# Patient Record
Sex: FEMALE | Race: WHITE | Employment: UNEMPLOYED | URBAN - METROPOLITAN AREA
[De-identification: names, ages, dates, MRNs, and addresses within clinical notes are randomized per-mention and may not be internally consistent; named-entity substitution may affect disease eponyms.]

---

## 2018-01-06 ENCOUNTER — HOSPITAL ENCOUNTER (EMERGENCY)
Age: 48
Discharge: HOME HEALTH | End: 2018-01-07
Attending: EMERGENCY MEDICINE

## 2018-01-06 DIAGNOSIS — J44.1 COPD EXACERBATION (HCC): Primary | ICD-10-CM

## 2018-01-06 DIAGNOSIS — R05.9 COUGH: ICD-10-CM

## 2018-01-06 PROCEDURE — 94640 AIRWAY INHALATION TREATMENT: CPT

## 2018-01-06 PROCEDURE — 6370000000 HC RX 637 (ALT 250 FOR IP): Performed by: EMERGENCY MEDICINE

## 2018-01-06 PROCEDURE — 99284 EMERGENCY DEPT VISIT MOD MDM: CPT

## 2018-01-06 RX ORDER — IPRATROPIUM BROMIDE AND ALBUTEROL SULFATE 2.5; .5 MG/3ML; MG/3ML
SOLUTION RESPIRATORY (INHALATION)
Status: DISCONTINUED
Start: 2018-01-06 | End: 2018-01-07 | Stop reason: HOSPADM

## 2018-01-06 RX ORDER — IPRATROPIUM BROMIDE AND ALBUTEROL SULFATE 2.5; .5 MG/3ML; MG/3ML
1 SOLUTION RESPIRATORY (INHALATION)
Status: DISCONTINUED | OUTPATIENT
Start: 2018-01-07 | End: 2018-01-07 | Stop reason: HOSPADM

## 2018-01-06 RX ORDER — AZITHROMYCIN 250 MG/1
500 TABLET, FILM COATED ORAL ONCE
Status: COMPLETED | OUTPATIENT
Start: 2018-01-06 | End: 2018-01-07

## 2018-01-06 RX ORDER — DEXAMETHASONE 4 MG/1
8 TABLET ORAL ONCE
Status: COMPLETED | OUTPATIENT
Start: 2018-01-06 | End: 2018-01-07

## 2018-01-06 RX ORDER — GUAIFENESIN 400 MG/1
400 TABLET ORAL 4 TIMES DAILY PRN
COMMUNITY
End: 2019-12-20

## 2018-01-06 RX ADMIN — IPRATROPIUM BROMIDE AND ALBUTEROL SULFATE 1 AMPULE: .5; 3 SOLUTION RESPIRATORY (INHALATION) at 23:49

## 2018-01-07 VITALS
SYSTOLIC BLOOD PRESSURE: 162 MMHG | HEART RATE: 77 BPM | DIASTOLIC BLOOD PRESSURE: 79 MMHG | HEIGHT: 59 IN | BODY MASS INDEX: 56.45 KG/M2 | OXYGEN SATURATION: 100 % | WEIGHT: 280 LBS | TEMPERATURE: 98.4 F | RESPIRATION RATE: 20 BRPM

## 2018-01-07 PROCEDURE — 6370000000 HC RX 637 (ALT 250 FOR IP): Performed by: EMERGENCY MEDICINE

## 2018-01-07 PROCEDURE — 94640 AIRWAY INHALATION TREATMENT: CPT

## 2018-01-07 PROCEDURE — 6360000002 HC RX W HCPCS: Performed by: EMERGENCY MEDICINE

## 2018-01-07 RX ORDER — BENZONATATE 100 MG/1
100 CAPSULE ORAL 3 TIMES DAILY PRN
Qty: 20 CAPSULE | Refills: 0 | Status: SHIPPED | OUTPATIENT
Start: 2018-01-07 | End: 2019-12-20

## 2018-01-07 RX ORDER — ALBUTEROL SULFATE 90 UG/1
AEROSOL, METERED RESPIRATORY (INHALATION)
Qty: 1 INHALER | Refills: 0 | Status: ON HOLD | OUTPATIENT
Start: 2018-01-07 | End: 2022-03-01 | Stop reason: SDUPTHER

## 2018-01-07 RX ORDER — PREDNISONE 20 MG/1
40 TABLET ORAL DAILY
Qty: 10 TABLET | Refills: 0 | Status: SHIPPED | OUTPATIENT
Start: 2018-01-07 | End: 2018-01-12

## 2018-01-07 RX ORDER — IPRATROPIUM BROMIDE AND ALBUTEROL SULFATE 2.5; .5 MG/3ML; MG/3ML
1 SOLUTION RESPIRATORY (INHALATION) ONCE
Status: COMPLETED | OUTPATIENT
Start: 2018-01-07 | End: 2018-01-07

## 2018-01-07 RX ORDER — AZITHROMYCIN 250 MG/1
TABLET, FILM COATED ORAL
Qty: 6 TABLET | Refills: 0 | Status: SHIPPED | OUTPATIENT
Start: 2018-01-07 | End: 2018-01-17

## 2018-01-07 RX ADMIN — IPRATROPIUM BROMIDE AND ALBUTEROL SULFATE 1 AMPULE: .5; 3 SOLUTION RESPIRATORY (INHALATION) at 01:07

## 2018-01-07 RX ADMIN — AZITHROMYCIN 500 MG: 250 TABLET, FILM COATED ORAL at 00:01

## 2018-01-07 RX ADMIN — DEXAMETHASONE 8 MG: 4 TABLET ORAL at 00:01

## 2018-01-07 ASSESSMENT — ENCOUNTER SYMPTOMS
SINUS PRESSURE: 0
ABDOMINAL PAIN: 0
CHEST TIGHTNESS: 0
STRIDOR: 0
CONSTIPATION: 0
DIARRHEA: 0
EYE REDNESS: 0
RHINORRHEA: 1
SINUS PAIN: 1
VOMITING: 0
EYE DISCHARGE: 0
SORE THROAT: 0
CHOKING: 0
VOICE CHANGE: 0
BACK PAIN: 0
EYE PAIN: 0
BLOOD IN STOOL: 0
TROUBLE SWALLOWING: 0
SHORTNESS OF BREATH: 1
WHEEZING: 1
FACIAL SWELLING: 0
COUGH: 1

## 2019-12-20 ENCOUNTER — HOSPITAL ENCOUNTER (EMERGENCY)
Age: 49
Discharge: HOME | End: 2019-12-20
Attending: EMERGENCY MEDICINE

## 2019-12-20 ENCOUNTER — APPOINTMENT (OUTPATIENT)
Dept: GENERAL RADIOLOGY | Age: 49
End: 2019-12-20

## 2019-12-20 VITALS
HEART RATE: 100 BPM | BODY MASS INDEX: 56.68 KG/M2 | DIASTOLIC BLOOD PRESSURE: 84 MMHG | HEIGHT: 58 IN | OXYGEN SATURATION: 99 % | WEIGHT: 270 LBS | RESPIRATION RATE: 26 BRPM | TEMPERATURE: 98.4 F | SYSTOLIC BLOOD PRESSURE: 188 MMHG

## 2019-12-20 DIAGNOSIS — R07.89 CHEST WALL PAIN: ICD-10-CM

## 2019-12-20 DIAGNOSIS — J20.9 ACUTE BRONCHITIS, UNSPECIFIED ORGANISM: ICD-10-CM

## 2019-12-20 DIAGNOSIS — J44.1 COPD EXACERBATION (HCC): Primary | ICD-10-CM

## 2019-12-20 DIAGNOSIS — F41.1 ANXIETY STATE: ICD-10-CM

## 2019-12-20 LAB
ALBUMIN SERPL-MCNC: 3.8 G/DL (ref 3.5–4.6)
ALP BLD-CCNC: 85 U/L (ref 40–130)
ALT SERPL-CCNC: 21 U/L (ref 0–33)
ANION GAP SERPL CALCULATED.3IONS-SCNC: 13 MEQ/L (ref 9–15)
APTT: 24 SEC (ref 24.4–36.8)
AST SERPL-CCNC: 13 U/L (ref 0–35)
BASOPHILS ABSOLUTE: 0.1 K/UL (ref 0–0.2)
BASOPHILS RELATIVE PERCENT: 0.5 %
BILIRUB SERPL-MCNC: <0.2 MG/DL (ref 0.2–0.7)
BUN BLDV-MCNC: 15 MG/DL (ref 6–20)
CALCIUM SERPL-MCNC: 9.8 MG/DL (ref 8.5–9.9)
CHLORIDE BLD-SCNC: 104 MEQ/L (ref 95–107)
CO2: 23 MEQ/L (ref 20–31)
CREAT SERPL-MCNC: 0.76 MG/DL (ref 0.5–0.9)
EKG ATRIAL RATE: 87 BPM
EKG P AXIS: 46 DEGREES
EKG P-R INTERVAL: 146 MS
EKG Q-T INTERVAL: 368 MS
EKG QRS DURATION: 68 MS
EKG QTC CALCULATION (BAZETT): 442 MS
EKG R AXIS: 74 DEGREES
EKG T AXIS: 45 DEGREES
EKG VENTRICULAR RATE: 87 BPM
EOSINOPHILS ABSOLUTE: 0.5 K/UL (ref 0–0.7)
EOSINOPHILS RELATIVE PERCENT: 3.9 %
GFR AFRICAN AMERICAN: >60
GFR NON-AFRICAN AMERICAN: >60
GLOBULIN: 3.4 G/DL (ref 2.3–3.5)
GLUCOSE BLD-MCNC: 115 MG/DL (ref 70–99)
HCT VFR BLD CALC: 41.4 % (ref 37–47)
HEMOGLOBIN: 13 G/DL (ref 12–16)
HYPOCHROMIA: PRESENT
INR BLD: 0.9
LYMPHOCYTES ABSOLUTE: 1.5 K/UL (ref 1–4.8)
LYMPHOCYTES RELATIVE PERCENT: 12.3 %
MCH RBC QN AUTO: 25.7 PG (ref 27–31.3)
MCHC RBC AUTO-ENTMCNC: 31.3 % (ref 33–37)
MCV RBC AUTO: 82.3 FL (ref 82–100)
MONOCYTES ABSOLUTE: 0.9 K/UL (ref 0.2–0.8)
MONOCYTES RELATIVE PERCENT: 7.5 %
NEUTROPHILS ABSOLUTE: 9.4 K/UL (ref 1.4–6.5)
NEUTROPHILS RELATIVE PERCENT: 75.8 %
PDW BLD-RTO: 15.8 % (ref 11.5–14.5)
PLATELET # BLD: 345 K/UL (ref 130–400)
POTASSIUM SERPL-SCNC: 4.3 MEQ/L (ref 3.4–4.9)
PRO-BNP: 39 PG/ML
PROTHROMBIN TIME: 12.3 SEC (ref 12.3–14.9)
RBC # BLD: 5.03 M/UL (ref 4.2–5.4)
SODIUM BLD-SCNC: 140 MEQ/L (ref 135–144)
TOTAL PROTEIN: 7.2 G/DL (ref 6.3–8)
TROPONIN: <0.01 NG/ML (ref 0–0.01)
WBC # BLD: 12.4 K/UL (ref 4.8–10.8)

## 2019-12-20 PROCEDURE — 85025 COMPLETE CBC W/AUTO DIFF WBC: CPT

## 2019-12-20 PROCEDURE — 71045 X-RAY EXAM CHEST 1 VIEW: CPT

## 2019-12-20 PROCEDURE — 99285 EMERGENCY DEPT VISIT HI MDM: CPT

## 2019-12-20 PROCEDURE — 84484 ASSAY OF TROPONIN QUANT: CPT

## 2019-12-20 PROCEDURE — 96375 TX/PRO/DX INJ NEW DRUG ADDON: CPT

## 2019-12-20 PROCEDURE — 85610 PROTHROMBIN TIME: CPT

## 2019-12-20 PROCEDURE — 96376 TX/PRO/DX INJ SAME DRUG ADON: CPT

## 2019-12-20 PROCEDURE — 6360000002 HC RX W HCPCS: Performed by: EMERGENCY MEDICINE

## 2019-12-20 PROCEDURE — 93005 ELECTROCARDIOGRAM TRACING: CPT

## 2019-12-20 PROCEDURE — 96365 THER/PROPH/DIAG IV INF INIT: CPT

## 2019-12-20 PROCEDURE — 6370000000 HC RX 637 (ALT 250 FOR IP): Performed by: EMERGENCY MEDICINE

## 2019-12-20 PROCEDURE — 2580000003 HC RX 258: Performed by: EMERGENCY MEDICINE

## 2019-12-20 PROCEDURE — 36415 COLL VENOUS BLD VENIPUNCTURE: CPT

## 2019-12-20 PROCEDURE — 80053 COMPREHEN METABOLIC PANEL: CPT

## 2019-12-20 PROCEDURE — 94640 AIRWAY INHALATION TREATMENT: CPT

## 2019-12-20 PROCEDURE — 83880 ASSAY OF NATRIURETIC PEPTIDE: CPT

## 2019-12-20 PROCEDURE — 85730 THROMBOPLASTIN TIME PARTIAL: CPT

## 2019-12-20 RX ORDER — ONDANSETRON 4 MG/1
4 TABLET, ORALLY DISINTEGRATING ORAL ONCE
Status: COMPLETED | OUTPATIENT
Start: 2019-12-20 | End: 2019-12-20

## 2019-12-20 RX ORDER — METHYLPREDNISOLONE SODIUM SUCCINATE 125 MG/2ML
125 INJECTION, POWDER, LYOPHILIZED, FOR SOLUTION INTRAMUSCULAR; INTRAVENOUS ONCE
Status: COMPLETED | OUTPATIENT
Start: 2019-12-20 | End: 2019-12-20

## 2019-12-20 RX ORDER — AZITHROMYCIN 250 MG/1
TABLET, FILM COATED ORAL
Qty: 1 PACKET | Refills: 0 | Status: SHIPPED | OUTPATIENT
Start: 2019-12-20 | End: 2021-09-04

## 2019-12-20 RX ORDER — HYDROCODONE BITARTRATE AND ACETAMINOPHEN 5; 325 MG/1; MG/1
1 TABLET ORAL EVERY 6 HOURS PRN
Qty: 10 TABLET | Refills: 0 | Status: SHIPPED | OUTPATIENT
Start: 2019-12-20 | End: 2019-12-23

## 2019-12-20 RX ORDER — ALBUTEROL SULFATE 90 UG/1
2 AEROSOL, METERED RESPIRATORY (INHALATION) EVERY 6 HOURS PRN
Qty: 1 INHALER | Refills: 3 | Status: ON HOLD | OUTPATIENT
Start: 2019-12-20 | End: 2022-03-01 | Stop reason: HOSPADM

## 2019-12-20 RX ORDER — AZITHROMYCIN 250 MG/1
500 TABLET, FILM COATED ORAL ONCE
Status: COMPLETED | OUTPATIENT
Start: 2019-12-20 | End: 2019-12-20

## 2019-12-20 RX ORDER — METHYLPREDNISOLONE 4 MG/1
TABLET ORAL
Qty: 1 KIT | Refills: 0 | Status: SHIPPED | OUTPATIENT
Start: 2019-12-20 | End: 2021-09-04 | Stop reason: ALTCHOICE

## 2019-12-20 RX ADMIN — AZITHROMYCIN 500 MG: 250 TABLET, FILM COATED ORAL at 21:29

## 2019-12-20 RX ADMIN — METHYLPREDNISOLONE SODIUM SUCCINATE 125 MG: 125 INJECTION, POWDER, FOR SOLUTION INTRAMUSCULAR; INTRAVENOUS at 21:29

## 2019-12-20 RX ADMIN — ONDANSETRON 4 MG: 4 TABLET, ORALLY DISINTEGRATING ORAL at 21:29

## 2019-12-20 RX ADMIN — CEFTRIAXONE 1 G: 1 INJECTION, POWDER, FOR SOLUTION INTRAMUSCULAR; INTRAVENOUS at 21:29

## 2019-12-20 RX ADMIN — HYDROMORPHONE HYDROCHLORIDE 0.5 MG: 1 INJECTION, SOLUTION INTRAMUSCULAR; INTRAVENOUS; SUBCUTANEOUS at 21:29

## 2019-12-20 RX ADMIN — ALBUTEROL SULFATE 5 MG: 2.5 SOLUTION RESPIRATORY (INHALATION) at 21:23

## 2019-12-20 RX ADMIN — HYDROMORPHONE HYDROCHLORIDE 0.5 MG: 1 INJECTION, SOLUTION INTRAMUSCULAR; INTRAVENOUS; SUBCUTANEOUS at 22:23

## 2019-12-20 ASSESSMENT — PAIN SCALES - GENERAL
PAINLEVEL_OUTOF10: 9
PAINLEVEL_OUTOF10: 5
PAINLEVEL_OUTOF10: 9

## 2019-12-20 ASSESSMENT — PAIN DESCRIPTION - PAIN TYPE: TYPE: ACUTE PAIN

## 2019-12-20 ASSESSMENT — PAIN DESCRIPTION - LOCATION: LOCATION: CHEST

## 2019-12-21 PROCEDURE — 93010 ELECTROCARDIOGRAM REPORT: CPT | Performed by: INTERNAL MEDICINE

## 2021-09-04 ENCOUNTER — OFFICE VISIT (OUTPATIENT)
Dept: FAMILY MEDICINE CLINIC | Age: 51
End: 2021-09-04

## 2021-09-04 ENCOUNTER — HOSPITAL ENCOUNTER (OUTPATIENT)
Age: 51
Setting detail: SPECIMEN
Discharge: HOME | End: 2021-09-04

## 2021-09-04 VITALS
TEMPERATURE: 98.9 F | WEIGHT: 286 LBS | HEART RATE: 86 BPM | DIASTOLIC BLOOD PRESSURE: 80 MMHG | HEIGHT: 59 IN | SYSTOLIC BLOOD PRESSURE: 130 MMHG | OXYGEN SATURATION: 95 % | RESPIRATION RATE: 24 BRPM | BODY MASS INDEX: 57.66 KG/M2

## 2021-09-04 DIAGNOSIS — J40 BRONCHITIS: Primary | ICD-10-CM

## 2021-09-04 DIAGNOSIS — R30.0 DYSURIA: ICD-10-CM

## 2021-09-04 LAB
BILIRUBIN, POC: ABNORMAL
BLOOD URINE, POC: ABNORMAL
CLARITY, POC: CLEAR
COLOR, POC: YELLOW
GLUCOSE URINE, POC: ABNORMAL
KETONES, POC: ABNORMAL
LEUKOCYTE EST, POC: ABNORMAL
NITRITE, POC: ABNORMAL
PH, POC: 6
PROTEIN, POC: ABNORMAL
SPECIFIC GRAVITY, POC: 1.03
UROBILINOGEN, POC: ABNORMAL

## 2021-09-04 PROCEDURE — 87077 CULTURE AEROBIC IDENTIFY: CPT

## 2021-09-04 PROCEDURE — 81003 URINALYSIS AUTO W/O SCOPE: CPT | Performed by: PHYSICIAN ASSISTANT

## 2021-09-04 PROCEDURE — 99213 OFFICE O/P EST LOW 20 MIN: CPT | Performed by: PHYSICIAN ASSISTANT

## 2021-09-04 PROCEDURE — 87086 URINE CULTURE/COLONY COUNT: CPT

## 2021-09-04 RX ORDER — CLOTRIMAZOLE 1 %
CREAM (GRAM) TOPICAL
Qty: 45 G | Refills: 1 | Status: SHIPPED | OUTPATIENT
Start: 2021-09-04 | End: 2021-09-11

## 2021-09-04 RX ORDER — ALBUTEROL SULFATE 2.5 MG/3ML
2.5 SOLUTION RESPIRATORY (INHALATION) EVERY 6 HOURS PRN
Qty: 25 EACH | Refills: 1 | Status: ON HOLD | OUTPATIENT
Start: 2021-09-04 | End: 2022-03-01 | Stop reason: HOSPADM

## 2021-09-04 RX ORDER — ALBUTEROL SULFATE 90 UG/1
AEROSOL, METERED RESPIRATORY (INHALATION)
Qty: 18 G | Refills: 1 | Status: ON HOLD | OUTPATIENT
Start: 2021-09-04 | End: 2022-03-01 | Stop reason: HOSPADM

## 2021-09-04 RX ORDER — AZITHROMYCIN 250 MG/1
250 TABLET, FILM COATED ORAL SEE ADMIN INSTRUCTIONS
Qty: 6 TABLET | Refills: 0 | Status: SHIPPED | OUTPATIENT
Start: 2021-09-04 | End: 2021-09-09

## 2021-09-04 ASSESSMENT — ENCOUNTER SYMPTOMS
VOMITING: 0
TROUBLE SWALLOWING: 0
ABDOMINAL PAIN: 0
BACK PAIN: 0
COUGH: 1
SHORTNESS OF BREATH: 1

## 2021-09-04 ASSESSMENT — PATIENT HEALTH QUESTIONNAIRE - PHQ9
1. LITTLE INTEREST OR PLEASURE IN DOING THINGS: 0
SUM OF ALL RESPONSES TO PHQ9 QUESTIONS 1 & 2: 1
2. FEELING DOWN, DEPRESSED OR HOPELESS: 1
SUM OF ALL RESPONSES TO PHQ QUESTIONS 1-9: 1

## 2021-09-04 NOTE — PATIENT INSTRUCTIONS
Patient Education        Bronchitis: Care Instructions  Your Care Instructions     Bronchitis is inflammation of the bronchial tubes, which carry air to the lungs. The tubes swell and produce mucus, or phlegm. The mucus and inflamed bronchial tubes make you cough. You may have trouble breathing. Most cases of bronchitis are caused by viruses like those that cause colds. Antibiotics usually do not help and they may be harmful. Bronchitis usually develops rapidly and lasts about 2 to 3 weeks in otherwise healthy people. Follow-up care is a key part of your treatment and safety. Be sure to make and go to all appointments, and call your doctor if you are having problems. It's also a good idea to know your test results and keep a list of the medicines you take. How can you care for yourself at home? · Take all medicines exactly as prescribed. Call your doctor if you think you are having a problem with your medicine. · Get some extra rest.  · Take an over-the-counter pain medicine, such as acetaminophen (Tylenol), ibuprofen (Advil, Motrin), or naproxen (Aleve) to reduce fever and relieve body aches. Read and follow all instructions on the label. · Do not take two or more pain medicines at the same time unless the doctor told you to. Many pain medicines have acetaminophen, which is Tylenol. Too much acetaminophen (Tylenol) can be harmful. · Take an over-the-counter cough medicine that contains dextromethorphan to help quiet a dry, hacking cough so that you can sleep. Avoid cough medicines that have more than one active ingredient. Read and follow all instructions on the label. · Breathe moist air from a humidifier, hot shower, or sink filled with hot water. The heat and moisture will thin mucus so you can cough it out. · Do not smoke. Smoking can make bronchitis worse. If you need help quitting, talk to your doctor about stop-smoking programs and medicines.  These can increase your chances of quitting for good.  When should you call for help? Call 911 anytime you think you may need emergency care. For example, call if:    · You have severe trouble breathing. Call your doctor now or seek immediate medical care if:    · You have new or worse trouble breathing.     · You cough up dark brown or bloody mucus (sputum).     · You have a new or higher fever.     · You have a new rash. Watch closely for changes in your health, and be sure to contact your doctor if:    · You cough more deeply or more often, especially if you notice more mucus or a change in the color of your mucus.     · You are not getting better as expected. Where can you learn more? Go to https://Meridian.[employer]. org and sign in to your [employer] account. Enter H333 in the Short Fuze box to learn more about \"Bronchitis: Care Instructions. \"     If you do not have an account, please click on the \"Sign Up Now\" link. Current as of: October 26, 2020               Content Version: 12.9  © 2006-2021 [employer]. Care instructions adapted under license by [facility] ([facility]). If you have questions about a medical condition or this instruction, always ask your healthcare professional. [place] [address] any warranty or liability for your use of this information. Patient Education        Painful Urination (Dysuria): Care Instructions  Your Care Instructions  Burning pain with urination (dysuria) is a common symptom of a urinary tract infection or other urinary problems. The bladder may become inflamed. This can cause pain when the bladder fills and empties. You may also feel pain if the tube that carries urine from the bladder to the outside of the body (urethra) gets irritated or infected. Sexually transmitted infections (STIs) also may cause pain when you urinate. Sometimes the pain can be caused by things other than an infection.  The urethra can be irritated by soaps, perfumes, or foreign objects in the urethra. Kidney stones can cause pain when they pass through the urethra. The cause may be hard to find. You may need tests. Treatment for painful urination depends on the cause. Follow-up care is a key part of your treatment and safety. Be sure to make and go to all appointments, and call your doctor if you are having problems. It's also a good idea to know your test results and keep a list of the medicines you take. How can you care for yourself at home? · Drink extra water for the next day or two. This will help make the urine less concentrated. (If you have kidney, heart, or liver disease and have to limit fluids, talk with your doctor before you increase the amount of fluids you drink.)  · Avoid drinks that are carbonated or have caffeine. They can irritate the bladder. · Urinate often. Try to empty your bladder each time. For women:  · Urinate right after you have sex. · After going to the bathroom, wipe from front to back. · Avoid douches, bubble baths, and feminine hygiene sprays. And avoid other feminine hygiene products that have deodorants. When should you call for help? Call your doctor now or seek immediate medical care if:    · You have new symptoms, such as fever, nausea, or vomiting.     · You have new or worse symptoms of a urinary problem. For example:  ? You have blood or pus in your urine. ? You have chills or body aches. ? It hurts worse to urinate. ? You have groin or belly pain. ? You have pain in your back just below your rib cage (the flank area). Watch closely for changes in your health, and be sure to contact your doctor if you have any problems. Where can you learn more? Go to https://[employer]b.[employer] org and sign in to your Direct Sitters account. Enter B832 in the Tweegee box to learn more about \"Painful Urination (Dysuria): Care Instructions. \"     If you do not have an account, please click on the \"Sign Up Now\" link.   Current as of: February 10, 2021               Content Version: 12.9  © 4003-7052 Healthwise, Incorporated. Care instructions adapted under license by [facility] ([facility]). If you have questions about a medical condition or this instruction, always ask your healthcare professional. [address] any warranty or liability for your use of this information.

## 2021-09-04 NOTE — PROGRESS NOTES
[facility] [address] Encounter  CHIEF COMPLAINT       Chief Complaint   Patient presents with    Dysuria     x4-5 days    Cough     x10 days    Congestion     x10 days; yellow mucus       HISTORY OF PRESENT ILLNESS   Russell Jung is a 49 y.o. female who presents with:  71-year-old female with 1 week history of cough productive of yellow sputum. She is a smoker and also history of COPD. Admits to some shortness of breath during the coughing episodes. Coughing has caused her to leak urine. She has had some dysuria and frequency over the past 2 days with some vaginal irritation. No fever or vomiting, otherwise nothing making symptoms better or worse, no other associated symptoms or complaints given. REVIEW OF SYSTEMS     Review of Systems   Constitutional: Negative for fever. HENT: Negative for trouble swallowing. Eyes: Negative for visual disturbance. Respiratory: Positive for cough and shortness of breath. Cardiovascular: Negative for chest pain. Gastrointestinal: Negative for abdominal pain and vomiting. Genitourinary: Positive for dysuria and frequency. Musculoskeletal: Negative for back pain. Skin: Negative for rash. Neurological: Negative for headaches. Psychiatric/Behavioral: Negative for confusion. PAST MEDICAL HISTORY         Diagnosis Date    [facility]     COPD (chronic obstructive pulmonary disease) (HCC)     MI [facility]myocardial infarction) [facility]     MS (multiple sclerosis) ([facility]     TB (tuberculosis)      SURGICAL HISTORY     Patient  has a past surgical history that includes  section and orthopedic surgery.   CURRENT MEDICATIONS       Previous Medications    ALBUTEROL (PROVENTIL) (5 MG/ML) 0.5% NEBULIZER SOLUTION    Take 0.5 mLs by nebulization every 6 hours as needed for Wheezing    ALBUTEROL SULFATE HFA (PROVENTIL HFA) 108 (90 BASE) MCG/ACT INHALER    Inhale 2 puffs into the lungs every 6 hours as needed for Wheezing    ALBUTEROL SULFATE  (90 BASE) MCG/ACT INHALER    Use 2 puffs 4 times daily for 7 days then as needed for wheezing. Dispense with Spacer and instruct in use. At patient's preference may use 60 dose MDI. May Sub Pro-Air or Proventil as needed per insurance. ALLERGIES     Patient is is allergic to tape [adhesive tape]. FAMILY HISTORY     Patient'sfamily history includes Arthritis in her brother, brother, and father; Asthma in her mother; Heart Disease in her father; High Blood Pressure in her mother; High Cholesterol in her mother. HISTORY     Patient  reports that she has been smoking. She has been smoking about 1.00 pack per day. She has never used smokeless tobacco. She reports that she does not drink alcohol and does not use drugs. PHYSICAL EXAM     VITALS  BP: 130/80, Temp: 98.9 °F (37.2 °C), Pulse: 86, Resp: 24, SpO2: 95 %  Physical Exam  Vitals and nursing note reviewed. Constitutional:       Appearance: Normal appearance. HENT:      Head: Normocephalic and atraumatic. Right Ear: Tympanic membrane normal.      Left Ear: Tympanic membrane normal.      Nose: Nose normal.      Mouth/Throat:      Mouth: Mucous membranes are moist.   Eyes:      Extraocular Movements: Extraocular movements intact. Pupils: Pupils are equal, round, and reactive to light. Cardiovascular:      Rate and Rhythm: Normal rate and regular rhythm. Heart sounds: No murmur heard. Pulmonary:      Effort: Pulmonary effort is normal.      Breath sounds: Normal breath sounds. No stridor. No wheezing or rales. Abdominal:      Palpations: Abdomen is soft. Tenderness: There is no abdominal tenderness. Musculoskeletal:         General: Normal range of motion. Cervical back: Normal range of motion and neck supple. No rigidity. Skin:     General: Skin is warm and dry. Capillary Refill: Capillary refill takes less than 2 seconds.    Neurological: General: No focal deficit present. Mental Status: She is alert. Psychiatric:         Mood and Affect: Mood normal.       READY CARE COURSE   Labs:  No results found for this visit on 09/04/21. IMAGING:  No orders to display     Scheduled Meds:  Continuous Infusions:  PRN Meds:. PROCEDURES:  FINAL IMPRESSION      1. Dysuria      DISPOSITION/PLAN   MDM  52-year-old female with 1 week history of cough and chest congestion history of Copd and smoker. Also some dysuria and frequency. She is well-appearing lungs are clear. Vital signs stable pulse ox 95%. Urinalysis was negative for leukocytes. Patient has a nebulizer machine at home which she will be given proair and albuterol for the machine. Zithromax prescribed . She admits to some vaginal irritation, questionable history of candidiasis in the past clotrimazole was prescribed. She will call for follow-up, most likely will call health ministry for follow-up. Instructed to be rechecked this week. Instructed on red flag symptoms which would prompt ER evaluation. PATIENT REFERRED TO:  Return in about 5 days (around 9/9/2021). DISCHARGE MEDICATIONS:  New Prescriptions    No medications on file     Cannot display discharge medications since this is not an admission.        Russ Dillard PA-C

## 2021-09-06 LAB
ORGANISM: ABNORMAL
URINE CULTURE, ROUTINE: ABNORMAL

## 2022-02-27 ENCOUNTER — HOSPITAL ENCOUNTER (INPATIENT)
Age: 52
LOS: 2 days | Discharge: HOME | DRG: 189 | End: 2022-03-01
Attending: EMERGENCY MEDICINE | Admitting: INTERNAL MEDICINE

## 2022-02-27 ENCOUNTER — APPOINTMENT (OUTPATIENT)
Dept: GENERAL RADIOLOGY | Age: 52
DRG: 189 | End: 2022-02-27

## 2022-02-27 DIAGNOSIS — F17.200 SMOKING: ICD-10-CM

## 2022-02-27 DIAGNOSIS — J44.1 COPD WITH ACUTE EXACERBATION (HCC): ICD-10-CM

## 2022-02-27 DIAGNOSIS — E87.5 HYPERKALEMIA: ICD-10-CM

## 2022-02-27 DIAGNOSIS — J44.1 COPD EXACERBATION (HCC): Primary | ICD-10-CM

## 2022-02-27 LAB
ALBUMIN SERPL-MCNC: 3.7 G/DL (ref 3.5–4.6)
ALP BLD-CCNC: 95 U/L (ref 40–130)
ALT SERPL-CCNC: 55 U/L (ref 0–33)
ANION GAP SERPL CALCULATED.3IONS-SCNC: 10 MEQ/L (ref 9–15)
APTT: 27.5 SEC (ref 24.4–36.8)
AST SERPL-CCNC: 59 U/L (ref 0–35)
BASOPHILS ABSOLUTE: 0.1 K/UL (ref 0–0.1)
BASOPHILS RELATIVE PERCENT: 0.6 % (ref 0.1–1.2)
BILIRUB SERPL-MCNC: 0.3 MG/DL (ref 0.2–0.7)
BUN BLDV-MCNC: 14 MG/DL (ref 6–20)
CALCIUM SERPL-MCNC: 9.4 MG/DL (ref 8.5–9.9)
CHLORIDE BLD-SCNC: 103 MEQ/L (ref 95–107)
CO2: 23 MEQ/L (ref 20–31)
CREAT SERPL-MCNC: 0.67 MG/DL (ref 0.5–0.9)
EKG ATRIAL RATE: 94 BPM
EKG P AXIS: 38 DEGREES
EKG P-R INTERVAL: 142 MS
EKG Q-T INTERVAL: 356 MS
EKG QRS DURATION: 68 MS
EKG QTC CALCULATION (BAZETT): 445 MS
EKG R AXIS: 72 DEGREES
EKG T AXIS: 41 DEGREES
EKG VENTRICULAR RATE: 94 BPM
EOSINOPHILS ABSOLUTE: 0.2 K/UL (ref 0–0.4)
EOSINOPHILS RELATIVE PERCENT: 2.7 % (ref 0.7–5.8)
GFR AFRICAN AMERICAN: >60
GFR NON-AFRICAN AMERICAN: >60
GLOBULIN: 3.4 G/DL (ref 2.3–3.5)
GLUCOSE BLD-MCNC: 77 MG/DL (ref 70–99)
HCT VFR BLD CALC: 50.9 % (ref 37–47)
HEMOGLOBIN: 16.9 G/DL (ref 11.2–15.7)
IMMATURE GRANULOCYTES #: 0.1 K/UL
IMMATURE GRANULOCYTES %: 0.6 %
INR BLD: 0.9
LYMPHOCYTES ABSOLUTE: 0.8 K/UL (ref 1.2–3.7)
LYMPHOCYTES RELATIVE PERCENT: 9.1 %
MCH RBC QN AUTO: 32 PG (ref 25.6–32.2)
MCHC RBC AUTO-ENTMCNC: 33.2 % (ref 32.2–35.5)
MCV RBC AUTO: 96.4 FL (ref 79.4–94.8)
MONOCYTES ABSOLUTE: 1.1 K/UL (ref 0.2–0.9)
MONOCYTES RELATIVE PERCENT: 12.6 % (ref 4.7–12.5)
NEUTROPHILS ABSOLUTE: 6.6 K/UL (ref 1.6–6.1)
NEUTROPHILS RELATIVE PERCENT: 74.4 % (ref 34–71.1)
PDW BLD-RTO: 13.8 % (ref 11.7–14.4)
PLATELET # BLD: 177 K/UL (ref 182–369)
POTASSIUM REFLEX MAGNESIUM: 5.5 MEQ/L (ref 3.4–4.9)
POTASSIUM SERPL-SCNC: 4.1 MEQ/L (ref 3.4–4.9)
PRO-BNP: 53 PG/ML
PROTHROMBIN TIME: 12.5 SEC (ref 12.3–14.9)
RBC # BLD: 5.28 M/UL (ref 3.93–5.22)
SARS-COV-2, NAAT: NOT DETECTED
SODIUM BLD-SCNC: 136 MEQ/L (ref 135–144)
TOTAL PROTEIN: 7.1 G/DL (ref 6.3–8)
TROPONIN: <0.01 NG/ML (ref 0–0.01)
WBC # BLD: 8.9 K/UL (ref 4–10)

## 2022-02-27 PROCEDURE — 96374 THER/PROPH/DIAG INJ IV PUSH: CPT

## 2022-02-27 PROCEDURE — 99285 EMERGENCY DEPT VISIT HI MDM: CPT

## 2022-02-27 PROCEDURE — 93005 ELECTROCARDIOGRAM TRACING: CPT

## 2022-02-27 PROCEDURE — 87040 BLOOD CULTURE FOR BACTERIA: CPT

## 2022-02-27 PROCEDURE — 85025 COMPLETE CBC W/AUTO DIFF WBC: CPT

## 2022-02-27 PROCEDURE — 83880 ASSAY OF NATRIURETIC PEPTIDE: CPT

## 2022-02-27 PROCEDURE — 6370000000 HC RX 637 (ALT 250 FOR IP): Performed by: INTERNAL MEDICINE

## 2022-02-27 PROCEDURE — 85730 THROMBOPLASTIN TIME PARTIAL: CPT

## 2022-02-27 PROCEDURE — 2580000003 HC RX 258: Performed by: EMERGENCY MEDICINE

## 2022-02-27 PROCEDURE — 87635 SARS-COV-2 COVID-19 AMP PRB: CPT

## 2022-02-27 PROCEDURE — 85610 PROTHROMBIN TIME: CPT

## 2022-02-27 PROCEDURE — 71045 X-RAY EXAM CHEST 1 VIEW: CPT

## 2022-02-27 PROCEDURE — 6370000000 HC RX 637 (ALT 250 FOR IP): Performed by: EMERGENCY MEDICINE

## 2022-02-27 PROCEDURE — 93010 ELECTROCARDIOGRAM REPORT: CPT | Performed by: INTERNAL MEDICINE

## 2022-02-27 PROCEDURE — 6360000002 HC RX W HCPCS: Performed by: EMERGENCY MEDICINE

## 2022-02-27 PROCEDURE — 1210000000 HC MED SURG R&B

## 2022-02-27 PROCEDURE — 84132 ASSAY OF SERUM POTASSIUM: CPT

## 2022-02-27 PROCEDURE — 84484 ASSAY OF TROPONIN QUANT: CPT

## 2022-02-27 PROCEDURE — 94640 AIRWAY INHALATION TREATMENT: CPT

## 2022-02-27 PROCEDURE — 36415 COLL VENOUS BLD VENIPUNCTURE: CPT

## 2022-02-27 PROCEDURE — 2580000003 HC RX 258: Performed by: INTERNAL MEDICINE

## 2022-02-27 PROCEDURE — 80053 COMPREHEN METABOLIC PANEL: CPT

## 2022-02-27 RX ORDER — DOXYCYCLINE HYCLATE 100 MG/1
100 CAPSULE ORAL EVERY 12 HOURS
Status: DISCONTINUED | OUTPATIENT
Start: 2022-02-28 | End: 2022-03-01 | Stop reason: HOSPADM

## 2022-02-27 RX ORDER — SODIUM CHLORIDE 9 MG/ML
25 INJECTION, SOLUTION INTRAVENOUS PRN
Status: DISCONTINUED | OUTPATIENT
Start: 2022-02-27 | End: 2022-03-01 | Stop reason: HOSPADM

## 2022-02-27 RX ORDER — IPRATROPIUM BROMIDE AND ALBUTEROL SULFATE 2.5; .5 MG/3ML; MG/3ML
1 SOLUTION RESPIRATORY (INHALATION) ONCE
Status: COMPLETED | OUTPATIENT
Start: 2022-02-27 | End: 2022-02-27

## 2022-02-27 RX ORDER — ONDANSETRON 4 MG/1
4 TABLET, ORALLY DISINTEGRATING ORAL EVERY 8 HOURS PRN
Status: DISCONTINUED | OUTPATIENT
Start: 2022-02-27 | End: 2022-03-01 | Stop reason: HOSPADM

## 2022-02-27 RX ORDER — ONDANSETRON 2 MG/ML
4 INJECTION INTRAMUSCULAR; INTRAVENOUS EVERY 6 HOURS PRN
Status: DISCONTINUED | OUTPATIENT
Start: 2022-02-27 | End: 2022-03-01 | Stop reason: HOSPADM

## 2022-02-27 RX ORDER — METHYLPREDNISOLONE SODIUM SUCCINATE 125 MG/2ML
125 INJECTION, POWDER, LYOPHILIZED, FOR SOLUTION INTRAMUSCULAR; INTRAVENOUS ONCE
Status: COMPLETED | OUTPATIENT
Start: 2022-02-27 | End: 2022-02-27

## 2022-02-27 RX ORDER — POLYETHYLENE GLYCOL 3350 17 G/17G
17 POWDER, FOR SOLUTION ORAL DAILY PRN
Status: DISCONTINUED | OUTPATIENT
Start: 2022-02-27 | End: 2022-03-01 | Stop reason: HOSPADM

## 2022-02-27 RX ORDER — ACETAMINOPHEN 650 MG/1
650 SUPPOSITORY RECTAL EVERY 6 HOURS PRN
Status: DISCONTINUED | OUTPATIENT
Start: 2022-02-27 | End: 2022-03-01 | Stop reason: HOSPADM

## 2022-02-27 RX ORDER — 0.9 % SODIUM CHLORIDE 0.9 %
1000 INTRAVENOUS SOLUTION INTRAVENOUS ONCE
Status: COMPLETED | OUTPATIENT
Start: 2022-02-27 | End: 2022-02-27

## 2022-02-27 RX ORDER — ACETAMINOPHEN 325 MG/1
650 TABLET ORAL EVERY 6 HOURS PRN
Status: DISCONTINUED | OUTPATIENT
Start: 2022-02-27 | End: 2022-03-01 | Stop reason: HOSPADM

## 2022-02-27 RX ORDER — SODIUM CHLORIDE 0.9 % (FLUSH) 0.9 %
5-40 SYRINGE (ML) INJECTION EVERY 12 HOURS SCHEDULED
Status: DISCONTINUED | OUTPATIENT
Start: 2022-02-27 | End: 2022-03-01 | Stop reason: HOSPADM

## 2022-02-27 RX ORDER — LEVOFLOXACIN 5 MG/ML
750 INJECTION, SOLUTION INTRAVENOUS ONCE
Status: COMPLETED | OUTPATIENT
Start: 2022-02-27 | End: 2022-02-27

## 2022-02-27 RX ORDER — GUAIFENESIN 600 MG/1
600 TABLET, EXTENDED RELEASE ORAL 3 TIMES DAILY PRN
Status: DISCONTINUED | OUTPATIENT
Start: 2022-02-27 | End: 2022-02-28

## 2022-02-27 RX ORDER — METHYLPREDNISOLONE SODIUM SUCCINATE 40 MG/ML
40 INJECTION, POWDER, LYOPHILIZED, FOR SOLUTION INTRAMUSCULAR; INTRAVENOUS EVERY 8 HOURS
Status: DISCONTINUED | OUTPATIENT
Start: 2022-02-28 | End: 2022-03-01 | Stop reason: HOSPADM

## 2022-02-27 RX ORDER — SODIUM CHLORIDE 0.9 % (FLUSH) 0.9 %
5-40 SYRINGE (ML) INJECTION PRN
Status: DISCONTINUED | OUTPATIENT
Start: 2022-02-27 | End: 2022-03-01 | Stop reason: HOSPADM

## 2022-02-27 RX ORDER — ALBUTEROL SULFATE 2.5 MG/3ML
2.5 SOLUTION RESPIRATORY (INHALATION) EVERY 4 HOURS PRN
Status: DISCONTINUED | OUTPATIENT
Start: 2022-02-27 | End: 2022-03-01 | Stop reason: HOSPADM

## 2022-02-27 RX ORDER — IPRATROPIUM BROMIDE AND ALBUTEROL SULFATE 2.5; .5 MG/3ML; MG/3ML
1 SOLUTION RESPIRATORY (INHALATION) 3 TIMES DAILY
Status: DISCONTINUED | OUTPATIENT
Start: 2022-02-27 | End: 2022-03-01 | Stop reason: HOSPADM

## 2022-02-27 RX ADMIN — SODIUM CHLORIDE 1000 ML: 9 INJECTION, SOLUTION INTRAVENOUS at 18:43

## 2022-02-27 RX ADMIN — IPRATROPIUM BROMIDE AND ALBUTEROL SULFATE 1 AMPULE: .5; 2.5 SOLUTION RESPIRATORY (INHALATION) at 17:14

## 2022-02-27 RX ADMIN — LEVOFLOXACIN 750 MG: 5 INJECTION, SOLUTION INTRAVENOUS at 18:49

## 2022-02-27 RX ADMIN — Medication 10 ML: at 22:00

## 2022-02-27 RX ADMIN — METHYLPREDNISOLONE SODIUM SUCCINATE 125 MG: 125 INJECTION, POWDER, FOR SOLUTION INTRAMUSCULAR; INTRAVENOUS at 17:11

## 2022-02-27 RX ADMIN — IPRATROPIUM BROMIDE AND ALBUTEROL SULFATE 1 AMPULE: .5; 2.5 SOLUTION RESPIRATORY (INHALATION) at 22:12

## 2022-02-27 NOTE — ED PROVIDER NOTES
eMERGENCY dEPARTMENT eNCOUnter      CHIEF COMPLAINT    Chief Complaint   Patient presents with    Shortness of Breath     x2 days        HPI    Malena Dunn is a 47 y.o. female with hx of COPD , MI, TB, Smoking who presentsto ED from home   By private car   With complaint of SOB   Onset 2 days   Intensity of symptoms moderate   Patient is out of her nebulizer,. She has got cough with expectoration. She denies any fever or chills, chest pain. She says that her shortness of breath is worse since yesterday. She is short of breath with minimal exertion. Patient denies any chest pain. PAST MEDICAL HISTORY    Past Medical History:   Diagnosis Date    Cerebral artery occlusion with cerebral infarction ([address]     [facility]     [facility]myocardial infarction) [facility]     MS [facility]multiple sclerosis) [facility]     TB (tuberculosis)        SURGICAL HISTORY    Past Surgical History:   Procedure Laterality Date     SECTION      ORTHOPEDIC SURGERY      Thumb       CURRENT MEDICATIONS    Current Outpatient Rx   Medication Sig Dispense Refill    albuterol sulfate HFA (PROAIR HFA) 108 (90 Base) MCG/ACT inhaler Use every 4 hours while awake for 7-10 days then PRN wheezing  Dispense with SPACER and Instruct on use. May sub Ventolin or Proventil as needed per [employer].  18 g 1    albuterol (PROVENTIL) (2.5 MG/3ML) 0.083% nebulizer solution Take 3 mLs by nebulization every 6 hours as needed for Wheezing 25 each 1    albuterol sulfate HFA (PROVENTIL HFA) 108 (90 Base) MCG/ACT inhaler Inhale 2 puffs into the lungs every 6 hours as needed for Wheezing (Patient not taking: Reported on 2021) 1 Inhaler 3    albuterol (PROVENTIL) (5 MG/ML) 0.5% nebulizer solution Take 0.5 mLs by nebulization every 6 hours as needed for Wheezing (Patient not taking: Reported on 2021) 120 each 0    albuterol sulfate  (90 Base) MCG/ACT inhaler Use 2 puffs 4 times daily for 7 days then as needed for wheezing. Dispense with Spacer and instruct in use. At patient's preference may use 60 dose MDI. May Sub Pro-Air or Proventil as needed per insurance. (Patient not taking: Reported on 9/4/2021) 1 Inhaler 0       ALLERGIES    Allergies   Allergen Reactions    Tape Garnell  Tape] Other (See Comments)     Skin blisters       FAMILY HISTORY    Family History   Problem Relation Age of Onset    Asthma Mother     High Blood Pressure Mother     High Cholesterol Mother     Arthritis Father     Heart Disease Father     Arthritis Brother     Arthritis Brother        SOCIAL HISTORY    Social History     Socioeconomic History    Marital status:      Spouse name: None    Number of children: None    Years of education: None    Highest education level: None   Occupational History    None   Tobacco Use    Smoking status: Current Every Day Smoker     Packs/day: 1.00    Smokeless tobacco: Never Used   Vaping Use    Vaping Use: Never used   Substance and Sexual Activity    Alcohol use: No    Drug use: No    Sexual activity: Yes     Partners: Male   Other Topics Concern    None   Social History Narrative    None     Social Determinants of Health     Financial Resource Strain:     Difficulty of Paying Living Expenses: Not on file   Food Insecurity:     Worried About Running Out of Food in the Last Year: Not on file    Giovana of Food in the Last Year: Not on file   Transportation Needs:     Lack of Transportation (Medical): Not on file    Lack of Transportation (Non-Medical):  Not on file   Physical Activity:     Days of Exercise per Week: Not on file    Minutes of Exercise per Session: Not on file   Stress:     Feeling of Stress : Not on file   Social Connections:     Frequency of Communication with Friends and Family: Not on file    Frequency of Social Gatherings with Friends and Family: Not on file    Attends Hoahaoism Services: Not on file    Active Member of Clubs or Organizations: Not on file    Attends Club or Organization Meetings: Not on file    Marital Status: Not on file   Intimate Partner Violence:     Fear of Current or Ex-Partner: Not on file    Emotionally Abused: Not on file    Physically Abused: Not on file    Sexually Abused: Not on file   Housing Stability:     Unable to Pay for Housing in the Last Year: Not on file    Number of [place] in the Last Year: Not on file    Unstable Housing in the Last Year: Not on file       REVIEW OF SYSTEMS    Constitutional:  Denies fever, chills, weight loss or weakness   Eyes:  Denies photophobia or discharge   HENT:  Denies sore throat or ear pain   Respiratory: Complains of cough and shortness of breath   Cardiovascular:  Denies chest pain, palpitations or swelling   GI:  Denies abdominal pain, nausea, vomiting, or diarrhea   Musculoskeletal:  Denies back pain   Skin:  Denies rash   Neurologic:  Denies headache, focal weakness or sensory changes   Endocrine:  Denies polyuria or polydypsia   Lymphatic:  Denies swollen glands   Psychiatric:  Denies depression, suicidal ideation or homicidal ideation   All systems negative except as marked. PHYSICAL EXAM    VITAL SIGNS: BP (!) 154/66   Pulse 98   Temp 98.9 °F (37.2 °C) (Temporal)   Resp 22   Wt 280 lb (127 kg)   SpO2 96%   BMI 56.55 kg/m²    Constitutional: Morbidly obese, moderate acute distress, Non-toxic appearance. HENT:  Normocephalic, Atraumatic, Bilateral external ears normal, Oropharynx moist, No oral exudates, Nose normal. Neck- Normal range of motion, No tenderness, Supple, No stridor. Eyes:  PERRL, EOMI, Conjunctiva normal, No discharge. Respiratory:  Normal breath sounds, moderate respiratory distress, diffuse expiratory wheezing, No chest tenderness. Cardiovascular:  Normal heart rate, Normal rhythm, No murmurs, No rubs, No gallops. GI:  Bowel sounds normal, Soft, No tenderness, No masses, No pulsatile masses. :No CVA tenderness. Musculoskeletal:  Intact distal pulses, No pedal edema, No calf tenderness, No cyanosis, No clubbing. Good range of motion in all major joints. No tenderness to palpation or major deformities noted. Back- No tenderness. Integument:  Warm, Dry, No erythema, No rash. Lymphatic:  No lymphadenopathy noted. Neurologic:  Alert & oriented x 3, Normal motor function, Normal sensory function, No focal deficits noted. Psychiatric:  Affect normal, Judgment normal, Mood normal.     EKG    NSR, HR 94 , Normal Axis, No ST- T wave changes, QTc 445    RADIOLOGY    XR CHEST PORTABLE   Final Result   NO ACUTE CARDIOPULMONARY DISEASE. REEVALUATION   Patient was updated the results of labs and Radiology.   Spoke with Dr Cesar Butt accepted patient admission     Labs  Labs Reviewed   CBC WITH AUTO DIFFERENTIAL - Abnormal; Notable for the following components:       Result Value    RBC 5.28 (*)     Hemoglobin 16.9 (*)     Hematocrit 50.9 (*)     MCV 96.4 (*)     Platelets 696 (*)     Neutrophils % 74.4 (*)     Monocytes % 12.6 (*)     Neutrophils Absolute 6.6 (*)     Lymphocytes Absolute 0.8 (*)     Monocytes Absolute 1.1 (*)     All other components within normal limits   COMPREHENSIVE METABOLIC PANEL W/ REFLEX TO MG FOR LOW K - Abnormal; Notable for the following components:    Potassium reflex Magnesium 5.5 (*)     ALT 55 (*)     AST 59 (*)     All other components within normal limits   COVID-19, RAPID   CULTURE, BLOOD 1   CULTURE, BLOOD 2   TROPONIN   BRAIN NATRIURETIC PEPTIDE   PROTIME-INR   APTT             Summation      Patient Course:     ED Medications administered this visit:    Medications   0.9 % sodium chloride bolus (1,000 mLs IntraVENous New Bag 2/27/22 9042)   levoFLOXacin (LEVAQUIN) 750 MG/150ML infusion 750 mg (has no administration in time range)   methylPREDNISolone sodium (SOLU-MEDROL) injection 125 mg (125 mg IntraVENous Given 2/27/22 1711)   ipratropium-albuterol (DUONEB) nebulizer solution 1 ampule (1 ampule Inhalation Given 2/27/22 1994)       New Prescriptions from this visit:    New Prescriptions    No medications on file       Follow-up:  No follow-up provider specified. Final Impression:   1. COPD exacerbation ([address]    2. Hyperkalemia    3.  Smoking               (Please note that portions of this note were completed with a voice recognition program.  Efforts were made to edit the dictations but occasionally words are mis-transcribed.)          John Quinn MD  02/27/22 6235

## 2022-02-27 NOTE — ED TRIAGE NOTES
Shortness of breath and cough x2 days. Patient states she has been out of nebulizer treatments for some time. A/0 x3, ambulatory, dyspnea on exertion. She does not wear oxygen at home.

## 2022-02-27 NOTE — ED NOTES
Dr. Nehemiah Weeks agrees to admit. Supervisor aware patient to go to 224. Registration aware.      Aurelia Pate, RN  02/27/22 [id number] Deepali Pro RN  02/27/22 0449

## 2022-02-28 LAB
ALBUMIN SERPL-MCNC: 3.4 G/DL (ref 3.5–4.6)
ALP BLD-CCNC: 90 U/L (ref 40–130)
ALT SERPL-CCNC: 44 U/L (ref 0–33)
ANION GAP SERPL CALCULATED.3IONS-SCNC: 9 MEQ/L (ref 9–15)
AST SERPL-CCNC: 23 U/L (ref 0–35)
BASOPHILS ABSOLUTE: 0 K/UL (ref 0–0.1)
BASOPHILS RELATIVE PERCENT: 0.2 % (ref 0.1–1.2)
BILIRUB SERPL-MCNC: <0.2 MG/DL (ref 0.2–0.7)
BILIRUBIN DIRECT: <0.2 MG/DL (ref 0–0.4)
BILIRUBIN, INDIRECT: ABNORMAL MG/DL (ref 0–0.6)
BUN BLDV-MCNC: 12 MG/DL (ref 6–20)
CALCIUM SERPL-MCNC: 9.2 MG/DL (ref 8.5–9.9)
CHLORIDE BLD-SCNC: 108 MEQ/L (ref 95–107)
CO2: 22 MEQ/L (ref 20–31)
CREAT SERPL-MCNC: 0.73 MG/DL (ref 0.5–0.9)
EOSINOPHILS ABSOLUTE: 0 K/UL (ref 0–0.4)
EOSINOPHILS RELATIVE PERCENT: 0 % (ref 0.7–5.8)
GFR AFRICAN AMERICAN: >60
GFR NON-AFRICAN AMERICAN: >60
GLUCOSE BLD-MCNC: 199 MG/DL (ref 70–99)
HCT VFR BLD CALC: 47.1 % (ref 37–47)
HEMOGLOBIN: 15.3 G/DL (ref 11.2–15.7)
IMMATURE GRANULOCYTES #: 0.1 K/UL
IMMATURE GRANULOCYTES %: 0.7 %
LYMPHOCYTES ABSOLUTE: 0.7 K/UL (ref 1.2–3.7)
LYMPHOCYTES RELATIVE PERCENT: 7.5 %
MCH RBC QN AUTO: 31.1 PG (ref 25.6–32.2)
MCHC RBC AUTO-ENTMCNC: 32.5 % (ref 32.2–35.5)
MCV RBC AUTO: 95.7 FL (ref 79.4–94.8)
MONOCYTES ABSOLUTE: 0.4 K/UL (ref 0.2–0.9)
MONOCYTES RELATIVE PERCENT: 4.5 % (ref 4.7–12.5)
NEUTROPHILS ABSOLUTE: 7.9 K/UL (ref 1.6–6.1)
NEUTROPHILS RELATIVE PERCENT: 87.1 % (ref 34–71.1)
PDW BLD-RTO: 13.5 % (ref 11.7–14.4)
PLATELET # BLD: 172 K/UL (ref 182–369)
POTASSIUM REFLEX MAGNESIUM: 4.4 MEQ/L (ref 3.4–4.9)
RBC # BLD: 4.92 M/UL (ref 3.93–5.22)
SODIUM BLD-SCNC: 139 MEQ/L (ref 135–144)
TOTAL PROTEIN: 6.4 G/DL (ref 6.3–8)
WBC # BLD: 9.1 K/UL (ref 4–10)

## 2022-02-28 PROCEDURE — 80074 ACUTE HEPATITIS PANEL: CPT

## 2022-02-28 PROCEDURE — 80048 BASIC METABOLIC PNL TOTAL CA: CPT

## 2022-02-28 PROCEDURE — 1210000000 HC MED SURG R&B

## 2022-02-28 PROCEDURE — 6370000000 HC RX 637 (ALT 250 FOR IP): Performed by: INTERNAL MEDICINE

## 2022-02-28 PROCEDURE — 80076 HEPATIC FUNCTION PANEL: CPT

## 2022-02-28 PROCEDURE — 2580000003 HC RX 258: Performed by: INTERNAL MEDICINE

## 2022-02-28 PROCEDURE — 85025 COMPLETE CBC W/AUTO DIFF WBC: CPT

## 2022-02-28 PROCEDURE — 94640 AIRWAY INHALATION TREATMENT: CPT

## 2022-02-28 PROCEDURE — 6360000002 HC RX W HCPCS: Performed by: INTERNAL MEDICINE

## 2022-02-28 PROCEDURE — 94761 N-INVAS EAR/PLS OXIMETRY MLT: CPT

## 2022-02-28 PROCEDURE — 83036 HEMOGLOBIN GLYCOSYLATED A1C: CPT

## 2022-02-28 PROCEDURE — 36415 COLL VENOUS BLD VENIPUNCTURE: CPT

## 2022-02-28 PROCEDURE — 2700000000 HC OXYGEN THERAPY PER DAY

## 2022-02-28 RX ORDER — GUAIFENESIN 600 MG/1
600 TABLET, EXTENDED RELEASE ORAL 2 TIMES DAILY
Status: DISCONTINUED | OUTPATIENT
Start: 2022-02-28 | End: 2022-03-01 | Stop reason: HOSPADM

## 2022-02-28 RX ORDER — ALPRAZOLAM 0.5 MG/1
0.5 TABLET ORAL EVERY 6 HOURS PRN
Status: DISCONTINUED | OUTPATIENT
Start: 2022-02-28 | End: 2022-03-01 | Stop reason: HOSPADM

## 2022-02-28 RX ORDER — NICOTINE 21 MG/24HR
1 PATCH, TRANSDERMAL 24 HOURS TRANSDERMAL DAILY
Status: DISCONTINUED | OUTPATIENT
Start: 2022-02-28 | End: 2022-03-01 | Stop reason: HOSPADM

## 2022-02-28 RX ORDER — NICOTINE 21 MG/24HR
1 PATCH, TRANSDERMAL 24 HOURS TRANSDERMAL DAILY
Status: DISCONTINUED | OUTPATIENT
Start: 2022-02-28 | End: 2022-02-28

## 2022-02-28 RX ORDER — GUAIFENESIN/DEXTROMETHORPHAN 100-10MG/5
5 SYRUP ORAL EVERY 4 HOURS PRN
Status: DISCONTINUED | OUTPATIENT
Start: 2022-02-28 | End: 2022-03-01 | Stop reason: HOSPADM

## 2022-02-28 RX ADMIN — IPRATROPIUM BROMIDE AND ALBUTEROL SULFATE 1 AMPULE: .5; 2.5 SOLUTION RESPIRATORY (INHALATION) at 18:00

## 2022-02-28 RX ADMIN — IPRATROPIUM BROMIDE AND ALBUTEROL SULFATE 1 AMPULE: .5; 2.5 SOLUTION RESPIRATORY (INHALATION) at 11:19

## 2022-02-28 RX ADMIN — ACETAMINOPHEN 650 MG: 325 TABLET ORAL at 00:10

## 2022-02-28 RX ADMIN — METHYLPREDNISOLONE SODIUM SUCCINATE 40 MG: 40 INJECTION, POWDER, FOR SOLUTION INTRAMUSCULAR; INTRAVENOUS at 16:54

## 2022-02-28 RX ADMIN — GUAIFENESIN 600 MG: 600 TABLET ORAL at 08:58

## 2022-02-28 RX ADMIN — IPRATROPIUM BROMIDE AND ALBUTEROL SULFATE 1 AMPULE: .5; 2.5 SOLUTION RESPIRATORY (INHALATION) at 06:04

## 2022-02-28 RX ADMIN — ACETAMINOPHEN 650 MG: 325 TABLET ORAL at 06:21

## 2022-02-28 RX ADMIN — DOXYCYCLINE HYCLATE 100 MG: 100 CAPSULE ORAL at 20:29

## 2022-02-28 RX ADMIN — ACETAMINOPHEN 650 MG: 325 TABLET ORAL at 21:20

## 2022-02-28 RX ADMIN — Medication 10 ML: at 08:42

## 2022-02-28 RX ADMIN — Medication 10 ML: at 20:29

## 2022-02-28 RX ADMIN — DOXYCYCLINE HYCLATE 100 MG: 100 CAPSULE ORAL at 08:43

## 2022-02-28 RX ADMIN — METHYLPREDNISOLONE SODIUM SUCCINATE 40 MG: 40 INJECTION, POWDER, FOR SOLUTION INTRAMUSCULAR; INTRAVENOUS at 08:52

## 2022-02-28 RX ADMIN — GUAIFENESIN 600 MG: 600 TABLET ORAL at 20:29

## 2022-02-28 ASSESSMENT — PAIN SCALES - GENERAL
PAINLEVEL_OUTOF10: 8
PAINLEVEL_OUTOF10: 0
PAINLEVEL_OUTOF10: 3
PAINLEVEL_OUTOF10: 3

## 2022-02-28 ASSESSMENT — PAIN DESCRIPTION - ONSET: ONSET: GRADUAL

## 2022-02-28 ASSESSMENT — PAIN DESCRIPTION - ORIENTATION: ORIENTATION: MID

## 2022-02-28 ASSESSMENT — PAIN DESCRIPTION - DESCRIPTORS: DESCRIPTORS: DULL;HEADACHE

## 2022-02-28 ASSESSMENT — PAIN - FUNCTIONAL ASSESSMENT: PAIN_FUNCTIONAL_ASSESSMENT: ACTIVITIES ARE NOT PREVENTED

## 2022-02-28 ASSESSMENT — PAIN DESCRIPTION - LOCATION: LOCATION: HEAD

## 2022-02-28 ASSESSMENT — PAIN DESCRIPTION - PAIN TYPE: TYPE: ACUTE PAIN

## 2022-02-28 ASSESSMENT — PAIN DESCRIPTION - FREQUENCY: FREQUENCY: INTERMITTENT

## 2022-02-28 NOTE — H&P
Hospital Medicine History & Physical      PCP: No primary care provider on file. Date of Admission: 2022    Date of Service: 22      Chief Complaint:  Dyspnea, SOB, cough, wheezing      History Of Present Illness:  47 y.o. female who presented to [facility] with above complains. For the past 2-3 days noted progression in dyspnea, SOB, developed dry cough, had audible wheezing at home. This condition were getting progressively worse to the point she wasn't able to ambulate yesterday. At this point she came to ER and after initial stabilization she was admitted for further management. She still smoking 1PPD, was smoking 4PPD in the past. Had nebulizer but run out of her medications several mts ago. Denied CP, fever. Past Medical History:          Diagnosis Date    [facility]bral [facility] occlusion [facility]     COPD (chronic obstructive pulmonary disease) (HCC)     MI (myocardial infarction) ([facility]     MS (multiple sclerosis) ([facility]     TB (tuberculosis)        Past Surgical History:          Procedure Laterality Date     SECTION      ORTHOPEDIC SURGERY      Thumb       Medications Prior to Admission:      Prior to Admission medications    Medication Sig Start Date End Date Taking? Authorizing Provider   albuterol sulfate HFA (PROAIR HFA) 108 (90 Base) MCG/ACT inhaler Use every 4 hours while awake for 7-10 days then PRN wheezing  Dispense with SPACER and Instruct on use. May sub Ventolin or Proventil as needed per [employer].  21   Derek Encarnacion PA-C   albuterol (PROVENTIL) (2.5 MG/3ML) 0.083% nebulizer solution Take 3 mLs by nebulization every 6 hours as needed for Wheezing 21   Derek Encarnacion PA-C   albuterol sulfate HFA (PROVENTIL HFA) 108 (90 Base) MCG/ACT inhaler Inhale 2 puffs into the lungs every 6 hours as needed for Wheezing  Patient not taking: Reported on 19   Rome Matos MD   albuterol (PROVENTIL) (5 MG/ML) 0.5% nebulizer solution Take 0.5 mLs by nebulization every 6 hours as needed for Wheezing 12/20/19   Dillan Meehan MD   albuterol sulfate  (90 Base) MCG/ACT inhaler Use 2 puffs 4 times daily for 7 days then as needed for wheezing. Dispense with Spacer and instruct in use. At patient's preference may use 60 dose MDI. May Sub Pro-Air or Proventil as needed per insurance. Patient not taking: Reported on 9/4/2021 1/7/18   Yoshi Castro MD       Allergies:  Tape Scottie Banter tape]    Social History:      The patient currently lives home    TOBACCO:   reports that she has been smoking. She has been smoking about 1.00 pack per day. She has never used smokeless tobacco.  ETOH:   reports no history of alcohol use. Family History:       Reviewed in detail and negative for DM, CAD, Cancer, CVA. Positive as follows:        Problem Relation Age of Onset    Asthma Mother     High Blood Pressure Mother     High Cholesterol Mother     Arthritis Father     Heart Disease Father     Arthritis Brother     Arthritis Brother        REVIEW OF SYSTEMS:   Pertinent positives as noted in the HPI. All other systems reviewed and negative. PHYSICAL EXAM:    /65   Pulse 92   Temp 97.5 °F (36.4 °C) (Oral)   Resp 20   Ht 4' (1.219 m)   Wt 296 lb 4.8 oz (134.4 kg)   SpO2 94%   BMI 90.42 kg/m²     General appearance:  No apparent distress, appears stated age and cooperative. HEENT:  Normal cephalic, atraumatic without obvious deformity. Pupils equal, round, and reactive to light. Extra ocular muscles intact. Conjunctivae/corneas clear. Neck: Supple, with full range of motion. No jugular venous distention. Trachea midline. Respiratory:  Normal respiratory effort. Harsh BS bilaterally without Rales/Wheezes/Rhonchi. Cardiovascular:  Regular rate and rhythm with normal S1/S2 without murmurs, rubs or gallops. Abdomen: Soft, non-tender, non-distended with normal bowel sounds.   Musculoskeletal:  No clubbing, cyanosis or edema bilaterally. Full range of motion without deformity. Skin: Skin color, texture, turgor normal.  No rashes or lesions. Neurologic:  Neurovascularly intact without any focal sensory/motor deficits. Cranial nerves: II-XII intact, grossly non-focal.  Psychiatric:  Alert and oriented, thought content appropriate, normal insight  Capillary Refill: Brisk,< 3 seconds   Peripheral Pulses: +2 palpable, equal bilaterally       Labs:     Recent Labs     02/27/22  1720 02/28/22  0543   WBC 8.9 9.1   HGB 16.9* 15.3   HCT 50.9* 47.1*   * 172*     Recent Labs     02/27/22  1719 02/27/22 2128 02/28/22  0543     --  139   K 5.5* 4.1 4.4     --  108*   CO2 23  --  22   BUN 14  --  12   CREATININE 0.67  --  0.73   CALCIUM 9.4  --  9.2     Recent Labs     02/27/22  1719 02/28/22  0543   AST 59* 23   ALT 55* 44*   BILIDIR  --  <0.2   BILITOT 0.3 <0.2   ALKPHOS 95 90     Recent Labs     02/27/22  1720   INR 0.9     Recent Labs     02/27/22 1719   TROPONINI <0.010       Urinalysis:      Lab Results   Component Value Date    NITRU Negative 11/18/2014    WBCUA 0-2 11/18/2014    BACTERIA Few 11/18/2014    RBCUA 20-50 11/18/2014    BLOODU 1+ 09/04/2021    BLOODU Large 11/18/2014    SPECGRAV 1.030 09/04/2021    SPECGRAV >=1.030 11/18/2014    GLUCOSEU - 09/04/2021    GLUCOSEU Negative 11/18/2014           XR CHEST PORTABLE   Final Result   NO ACUTE CARDIOPULMONARY DISEASE. ASSESSMENT:    Active Hospital Problems    Diagnosis Date Noted    COPD with acute exacerbation ([facility] [J44.1] 02/27/2022       PLAN:        DVT Prophylaxis:   Diet: ADULT DIET;  Regular  Code Status: Full Code    PT/OT Eval Status:     Dispo - Dyspnea/SOB, wheezing, cough, desaturation- admitted and treatment for COPD initiated  Smoking- advice to stop, nicotinic patch applied  Acute respiratory failure due to above- O2 initiated, tapering NC O2, follow up clinically  Hyperglycemia probably related to high dose of steroids, ISS initiated, checking HBA1C  Morbid obesity with BMI 90%- supportive care  Mildly elevated liver enzymes- checking acute hepatitis panel. Further recommendations will follow    Medically stable for acute admission at Derrick Murillo MD    Thank you No primary care provider on file. for the opportunity to be involved in this patient's care. If you have any questions or concerns please feel free to contact me.

## 2022-02-28 NOTE — PROGRESS NOTES
Patient trialing room air with respiratory and her O2 sats are 92-94%. Patient has oxygen next to her in case she feels SOB and needs to use it. Patient refused Insulin coverage. On her laptop, rylie and talking about her personal life to this nurse. Call light in reach, no needs.

## 2022-02-28 NOTE — PROGRESS NOTES
Pt in room talking on the phone with someone and crying. Did hourly rounding on pt, asked pt if she was ok. Pt stated \"no, I want to go home, please shut my door\". Will continue to monitor pt.   Electronically signed by Dontae Caldwell RN on 2/27/2022 at 11:38 PM

## 2022-02-28 NOTE — PLAN OF CARE
Problem: Infection:  Goal: Will remain free from infection  Description: Will remain free from infection  Outcome: Ongoing     Problem: Safety:  Goal: Free from accidental physical injury  Description: Free from accidental physical injury  Outcome: Ongoing  Goal: Free from intentional harm  Description: Free from intentional harm  Outcome: Ongoing     Problem: Daily Care:  Goal: Daily care needs are met  Description: Daily care needs are met  Outcome: Ongoing     Problem: Pain:  Goal: Patient's pain/discomfort is manageable  Description: Patient's pain/discomfort is manageable  Outcome: Ongoing     Problem: Skin Integrity:  Goal: Skin integrity will stabilize  Description: Skin integrity will stabilize  Outcome: Ongoing     Problem: Discharge Planning:  Goal: Patients continuum of care needs are met  Description: Patients continuum of care needs are met  Outcome: Ongoing     Problem: Breathing Pattern - Ineffective:  Goal: Ability to achieve and maintain a regular respiratory rate will improve  Description: Ability to achieve and maintain a regular respiratory rate will improve  Outcome: Ongoing     Problem: Tobacco Use:  Goal: Inpatient tobacco use cessation counseling participation  Description: Inpatient tobacco use cessation counseling participation  Outcome: Ongoing

## 2022-02-28 NOTE — PROGRESS NOTES
[facility] Initial Discharge Assessment    Met with Patient to discuss discharge plan. PCP: No primary care provider on file. Date of Last Visit: N/A    If no PCP, list provided? This  provided patient with resources in [facility] where patient resides. Resources for medical provider include but not limited to; [facility], [address]    Discharge Planning    Living Arrangements: independently at home    Who do you live with? Spouse, adult disabled daughter, and mother, and friend     Who helps you with your care:  self    If lives at home:     Do you have any barriers navigating in your home? no    Patient can perform ADL? Yes    Current Services (outpatient and in home) :  None    Dialysis: No    Is transportation available to get to your appointments? Yes    DME Equipment:  No- Patient reports not having or needing any DME    Respiratory equipment: None    Respiratory provider:  no     Pharmacy:  yes - [address] in [address] to afford cost of medication: No- Patient provided with medication assistance resources along with Good RX card    Does patient feel safe at home? Yes    Does patient identify any home going needs? No- Patient reports that she is able to care for self, family and household chores. Patient identifies no home going needs at this time    Patient agreeable to [facility]? No- Patient reports no interest in [facility]    Patient agreeable to SNF/Rehab? No- Patient reports not needing any inpatient skilled therapies     Other discharge needs identified? Other Patient provided with information on [facility] providers that accept patient with sliding fee scale as patient reports not having insurance for a PCP    Was [place] of Choice Provided? Yes    Initial Discharge Plan?  (Note: please see concurrent daily documentation for any updates after initial note).      Chart reviewed. Patient was admitted to [facility] with a diagnosis of COPD. Patient's care discussed in daily quality rounds. Patient reported not to have any medical insurance and unable to afford cost of medications. This  received communication from Matthew To in the [address] [facility] reporting that patient was assessed and is over income for Medicaid however able to receive financial assistance from [facility] for current hospitalization. This  met with patient to discuss DC planning and help at home needs. Upon visit patient is alert and sitting up in recliner chair wearing oxygen. Patient is alert and oriented to person, place, and situation. Patient reports being independent for all ADLS along with assisting with adult daughter and adult friend's care needs. Patient reports feeling safe at home. Patient reports no interest in [place] at this time. Patient reports needing assistance with medication costs. This  provided patient with [employer] assistance information in [facility] along with [employer] information and provided patient with a good RX card to assist with medication payment. Patient also provided with [facility], [address] [place] [address], and [employer] in [facility] to receive medical care at sliding fee scale as patient reports not having a PCP. This  also provided patient with information on [address] [place] [address] to aid with adult daughter's needs. Patient thanks this  and reports no further needs at this time. SS to continue to follow as needed while patient is at [facility].     Electronically signed by SENG Parra on 2/28/2022 at 11:30 AM

## 2022-02-28 NOTE — PROGRESS NOTES
Patient assessed and has a barky cough she states is productive with yellow sputum (unobserved). Patient is SOB after coughing but recovers quickly. Reaching out to [place] for available resources in the community to help with medication costs. Call light in reach, patient in recliner due to not tolerating being in a bed. Patient spoke with Dr James Ram and is agreeable to stay one more day.

## 2022-02-28 NOTE — PROGRESS NOTES
Pt admitted to room 224. Pt alert and oriented. Pt independent in her room. Pt on 2 L of oxygen nasal canula for shortness of breath. Pt stated she does not have health insurance or a primary care dr. Renee Lawson she can not afford her breathing nebulizers because they are \"$400 a month\". Pt stated she can't get medicare because her  makes to much money. Pt denies any pain or needs at this time. Pt call light in reach at this time. Will continue to monitor pt.   Electronically signed by Krzysztof Kaiser RN on 2/27/2022 at 10:00 PM

## 2022-03-01 VITALS
BODY MASS INDEX: 67.81 KG/M2 | HEIGHT: 55 IN | TEMPERATURE: 97.6 F | WEIGHT: 293 LBS | SYSTOLIC BLOOD PRESSURE: 129 MMHG | RESPIRATION RATE: 20 BRPM | DIASTOLIC BLOOD PRESSURE: 68 MMHG | OXYGEN SATURATION: 99 % | HEART RATE: 105 BPM

## 2022-03-01 LAB
GLUCOSE BLD-MCNC: 199 MG/DL (ref 70–99)
HAV IGM SER IA-ACNC: NONREACTIVE
HBA1C MFR BLD: 6.2 % (ref 4.8–5.9)
HEPATITIS B CORE IGM ANTIBODY: NONREACTIVE
HEPATITIS B SURFACE ANTIGEN: NONREACTIVE
HEPATITIS C ANTIBODY: NONREACTIVE
PERFORMED ON: ABNORMAL

## 2022-03-01 PROCEDURE — 94640 AIRWAY INHALATION TREATMENT: CPT

## 2022-03-01 PROCEDURE — 6370000000 HC RX 637 (ALT 250 FOR IP): Performed by: INTERNAL MEDICINE

## 2022-03-01 PROCEDURE — 6360000002 HC RX W HCPCS: Performed by: INTERNAL MEDICINE

## 2022-03-01 RX ORDER — DOXYCYCLINE HYCLATE 100 MG/1
100 CAPSULE ORAL EVERY 12 HOURS
Qty: 8 CAPSULE | Refills: 0 | Status: SHIPPED | OUTPATIENT
Start: 2022-03-01 | End: 2022-03-05

## 2022-03-01 RX ORDER — NICOTINE 21 MG/24HR
1 PATCH, TRANSDERMAL 24 HOURS TRANSDERMAL DAILY
Qty: 30 PATCH | Refills: 3 | Status: SHIPPED | OUTPATIENT
Start: 2022-03-01

## 2022-03-01 RX ORDER — GUAIFENESIN 600 MG/1
600 TABLET, EXTENDED RELEASE ORAL 2 TIMES DAILY
Qty: 20 TABLET | Refills: 0 | Status: SHIPPED | OUTPATIENT
Start: 2022-03-01 | End: 2022-03-11

## 2022-03-01 RX ORDER — ACETAMINOPHEN AND CODEINE PHOSPHATE 300; 30 MG/1; MG/1
1 TABLET ORAL EVERY 4 HOURS PRN
Qty: 18 TABLET | Refills: 0 | Status: SHIPPED | OUTPATIENT
Start: 2022-03-01 | End: 2022-03-06

## 2022-03-01 RX ORDER — ALBUTEROL SULFATE 90 UG/1
AEROSOL, METERED RESPIRATORY (INHALATION)
Qty: 1 EACH | Refills: 3 | Status: SHIPPED | OUTPATIENT
Start: 2022-03-01

## 2022-03-01 RX ORDER — PREDNISONE 20 MG/1
20 TABLET ORAL 2 TIMES DAILY
Qty: 20 TABLET | Refills: 0 | Status: SHIPPED | OUTPATIENT
Start: 2022-03-01 | End: 2022-03-11

## 2022-03-01 RX ORDER — OXYCODONE HYDROCHLORIDE AND ACETAMINOPHEN 5; 325 MG/1; MG/1
1 TABLET ORAL EVERY 6 HOURS PRN
Qty: 12 TABLET | Refills: 0 | Status: SHIPPED | OUTPATIENT
Start: 2022-03-01 | End: 2022-03-06

## 2022-03-01 RX ADMIN — GUAIFENESIN SYRUP AND DEXTROMETHORPHAN 5 ML: 100; 10 SYRUP ORAL at 01:05

## 2022-03-01 RX ADMIN — METHYLPREDNISOLONE SODIUM SUCCINATE 40 MG: 40 INJECTION, POWDER, FOR SOLUTION INTRAMUSCULAR; INTRAVENOUS at 08:36

## 2022-03-01 RX ADMIN — ALBUTEROL SULFATE 2.5 MG: 2.5 SOLUTION RESPIRATORY (INHALATION) at 02:40

## 2022-03-01 RX ADMIN — IPRATROPIUM BROMIDE AND ALBUTEROL SULFATE 1 AMPULE: .5; 2.5 SOLUTION RESPIRATORY (INHALATION) at 05:57

## 2022-03-01 RX ADMIN — METHYLPREDNISOLONE SODIUM SUCCINATE 40 MG: 40 INJECTION, POWDER, FOR SOLUTION INTRAMUSCULAR; INTRAVENOUS at 01:00

## 2022-03-01 RX ADMIN — ACETAMINOPHEN 650 MG: 325 TABLET ORAL at 08:36

## 2022-03-01 RX ADMIN — ALPRAZOLAM 0.5 MG: 0.5 TABLET ORAL at 01:05

## 2022-03-01 RX ADMIN — DOXYCYCLINE HYCLATE 100 MG: 100 CAPSULE ORAL at 08:36

## 2022-03-01 RX ADMIN — GUAIFENESIN SYRUP AND DEXTROMETHORPHAN 5 ML: 100; 10 SYRUP ORAL at 05:53

## 2022-03-01 RX ADMIN — GUAIFENESIN 600 MG: 600 TABLET ORAL at 08:36

## 2022-03-01 ASSESSMENT — PAIN SCALES - GENERAL: PAINLEVEL_OUTOF10: 9

## 2022-03-01 NOTE — PROGRESS NOTES
Pt a and o. Pt independent in room . Pt up in chair as pt does not sleep in bed. Pt on RA. Last pO2 96%. Pt given tylenol for HA \"from coughing\". Pt also given PRN robitussin and PRN xanax at her request around 0100. See mar. Pt restless and unable to get comfortable. Frequent coughing. Call light in reach. No other concerns.

## 2022-03-01 NOTE — DISCHARGE SUMMARY
Discharge Summary    Patient:  Say Durand  YOB: 1970    MRN: [id number]   Acct: [de-identified]    Primary Care Physician: No primary care provider on file. Admit date:  2/27/2022    Discharge date:   03/01/22      Discharge Diagnoses:   COPD with acute exacerbation [facility])  Principal Problem:    COPD with acute exacerbation ([address]  Resolved Problems:    * No resolved hospital problems. *      Admitted for: [facility]: patient was admitted and treated for COPD exacerbation, acute respiratory failure. During her treatment we are able to titrate and DC NC O2, was treated with atbs/steroids, resp Tx and had improvement in her condition. After DC will needs to follow up with pulmonary service as outpatient. Had hyperglycemia related to high dose of steroids, HBA1c were obtain but pending at DC time. Consultants:      Discharge Medications:       Medication List      START taking these medications    doxycycline hyclate 100 MG capsule  Commonly known as: VIBRAMYCIN  Take 1 capsule by mouth every 12 hours for 8 doses     guaiFENesin 600 MG extended release tablet  Commonly known as: MUCINEX  Take 1 tablet by mouth 2 times daily for 10 days     nicotine 21 MG/24HR  Commonly known as: NICODERM CQ  Place 1 patch onto the skin daily     oxyCODONE-acetaminophen 5-325 MG per tablet  Commonly known as: Percocet  Take 1 tablet by mouth every 6 hours as needed for Pain for up to 5 days. Intended supply: 3 days. Take lowest dose possible to manage pain     predniSONE 20 MG tablet  Commonly known as: DELTASONE  Take 1 tablet by mouth 2 times daily for 10 days        CHANGE how you take these medications    * albuterol sulfate  (90 Base) MCG/ACT inhaler  Use 2 puffs 4 times daily for 7 days then as needed for wheezing. Dispense with Spacer and instruct in use. At patient's preference may use 60 dose MDI. May Sub Pro-Air or Proventil as needed per insurance.   What changed: Another medication with the same name was removed. Continue taking this medication, and follow the directions you see here. * albuterol (5 MG/ML) 0.5% nebulizer solution  Commonly known as: PROVENTIL  Take 0.5 mLs by nebulization every 6 hours as needed for Wheezing  What changed: Another medication with the same name was removed. Continue taking this medication, and follow the directions you see here. * This list has 2 medication(s) that are the same as other medications prescribed for you. Read the directions carefully, and ask your doctor or other care provider to review them with you.                Where to Get Your Medications      You can get these medications from any pharmacy    Bring a paper prescription for each of these medications  · albuterol (5 MG/ML) 0.5% nebulizer solution  · albuterol sulfate  (90 Base) MCG/ACT inhaler  · doxycycline hyclate 100 MG capsule  · guaiFENesin 600 MG extended release tablet  · nicotine 21 MG/24HR  · oxyCODONE-acetaminophen 5-325 MG per tablet  · predniSONE 20 MG tablet         Physical Exam:    Vitals:  Vitals:    02/28/22 1800 02/28/22 1815 03/01/22 0005 03/01/22 0603   BP:  (!) 154/80  129/68   Pulse:  109  105   Resp:  16  20   Temp:    97.6 °F (36.4 °C)   TempSrc:    Oral   SpO2: 97% 94% 94% 99%   Weight:       Height:         Weight: Weight: 296 lb 4.8 oz (134.4 kg)     24 hour intake/output:    Intake/Output Summary (Last 24 hours) at 3/1/2022 0741  Last data filed at 2/28/2022 2028  Gross per 24 hour   Intake 480 ml   Output --   Net 480 ml       General appearance - alert, well appearing, and in no distress  Chest - mild bibasilar wheezes, dry cough   Heart - normal rate, regular rhythm, normal S1, S2, no murmurs, rubs, clicks or gallops  Abdomen - soft, nontender, nondistended, no masses or organomegaly  Obese: Yes; Protuberant: Yes   Neurological - alert, oriented, normal speech, no focal findings or movement disorder noted  Extremities - peripheral pulses normal, no pedal edema, no clubbing or cyanosis  Skin - normal coloration and turgor, no rashes, no suspicious skin lesions noted        Radiology reports as per the Radiologist  Radiology: XR CHEST PORTABLE    Result Date: 2/27/2022  EXAMINATION: XR CHEST PORTABLE CLINICAL HISTORY: SHORTNESS OF BREATH COMPARISONS: DECEMBER 20, 2019 FINDINGS: Osseous structures intact. Cardiopericardial silhouette normal. Pulmonary vasculature normal. Lungs clear. NO ACUTE CARDIOPULMONARY DISEASE. Results for orders placed or performed during the hospital encounter of 02/27/22   COVID-19, Rapid    Specimen: Nasopharyngeal Swab   Result Value Ref Range    SARS-CoV-2, NAAT Not Detected Not Detected   Culture, Blood 1    Specimen: Blood   Result Value Ref Range    Blood Culture, Routine       No Growth to date. Any change in status will be called. Culture, Blood 2    Specimen: Blood   Result Value Ref Range    Culture, Blood 2       No Growth to date. Any change in status will be called.    CBC with Auto Differential   Result Value Ref Range    WBC 8.9 4.0 - 10.0 K/uL    RBC 5.28 (H) 3.93 - 5.22 M/uL    Hemoglobin 16.9 (H) 11.2 - 15.7 g/dL    Hematocrit 50.9 (H) 37.0 - 47.0 %    MCV 96.4 (H) 79.4 - 94.8 fL    MCH 32.0 25.6 - 32.2 pg    MCHC 33.2 32.2 - 35.5 %    RDW 13.8 11.7 - 14.4 %    Platelets 758 (L) 838 - 369 K/uL    Neutrophils % 74.4 (H) 34.0 - 71.1 %    Immature Granulocytes % 0.6 %    Lymphocytes % 9.1 %    Monocytes % 12.6 (H) 4.7 - 12.5 %    Eosinophils % 2.7 0.7 - 5.8 %    Basophils % 0.6 0.1 - 1.2 %    Neutrophils Absolute 6.6 (H) 1.6 - 6.1 K/uL    Immature Granulocytes # 0.1 K/uL    Lymphocytes Absolute 0.8 (L) 1.2 - 3.7 K/uL    Monocytes Absolute 1.1 (H) 0.2 - 0.9 K/uL    Eosinophils Absolute 0.2 0.0 - 0.4 K/uL    Basophils Absolute 0.1 0.0 - 0.1 K/uL   Comprehensive Metabolic Panel w/ Reflex to MG   Result Value Ref Range    Sodium 136 135 - 144 mEq/L    Potassium reflex Magnesium 5.5 (H) 3.4 - 4.9 mEq/L    Chloride 103 95 - 107 mEq/L    CO2 23 20 - 31 mEq/L    Anion Gap 10 9 - 15 mEq/L    Glucose 77 70 - 99 mg/dL    BUN 14 6 - 20 mg/dL    CREATININE 0.67 0.50 - 0.90 mg/dL    GFR Non-African American >60.0 >60    GFR  >60.0 >60    Calcium 9.4 8.5 - 9.9 mg/dL    Total Protein 7.1 6.3 - 8.0 g/dL    Albumin 3.7 3.5 - 4.6 g/dL    Total Bilirubin 0.3 0.2 - 0.7 mg/dL    Alkaline Phosphatase 95 40 - 130 U/L    ALT 55 (H) 0 - 33 U/L    AST 59 (H) 0 - 35 U/L    Globulin 3.4 2.3 - 3.5 g/dL   Troponin   Result Value Ref Range    Troponin <0.010 0.000 - 0.010 ng/mL   Brain Natriuretic Peptide   Result Value Ref Range    Pro-BNP 53 pg/mL   Protime-INR   Result Value Ref Range    Protime 12.5 12.3 - 14.9 sec    INR 0.9    APTT   Result Value Ref Range    aPTT 27.5 24.4 - 36.8 sec   Basic Metabolic Panel w/ Reflex to MG   Result Value Ref Range    Sodium 139 135 - 144 mEq/L    Potassium reflex Magnesium 4.4 3.4 - 4.9 mEq/L    Chloride 108 (H) 95 - 107 mEq/L    CO2 22 20 - 31 mEq/L    Anion Gap 9 9 - 15 mEq/L    Glucose 199 (H) 70 - 99 mg/dL    BUN 12 6 - 20 mg/dL    CREATININE 0.73 0.50 - 0.90 mg/dL    GFR Non-African American >60.0 >60    GFR  >60.0 >60    Calcium 9.2 8.5 - 9.9 mg/dL   Hepatic function panel   Result Value Ref Range    Total Protein 6.4 6.3 - 8.0 g/dL    Albumin 3.4 (L) 3.5 - 4.6 g/dL    Alkaline Phosphatase 90 40 - 130 U/L    ALT 44 (H) 0 - 33 U/L    AST 23 0 - 35 U/L    Total Bilirubin <0.2 0.2 - 0.7 mg/dL    Bilirubin, Direct <0.2 0.0 - 0.4 mg/dL    Bilirubin, Indirect see below 0.0 - 0.6 mg/dL   CBC with Auto Differential   Result Value Ref Range    WBC 9.1 4.0 - 10.0 K/uL    RBC 4.92 3.93 - 5.22 M/uL    Hemoglobin 15.3 11.2 - 15.7 g/dL    Hematocrit 47.1 (H) 37.0 - 47.0 %    MCV 95.7 (H) 79.4 - 94.8 fL    MCH 31.1 25.6 - 32.2 pg    MCHC 32.5 32.2 - 35.5 %    RDW 13.5 11.7 - 14.4 %    Platelets 084 (L) 680 - 369 K/uL    Neutrophils % 87.1 (H) 34.0 - 71.1 %    Immature Granulocytes % 0.7 %    Lymphocytes % 7.5 %    Monocytes % 4.5 (L) 4.7 - 12.5 %    Eosinophils % 0.0 (L) 0.7 - 5.8 %    Basophils % 0.2 0.1 - 1.2 %    Neutrophils Absolute 7.9 (H) 1.6 - 6.1 K/uL    Immature Granulocytes # 0.1 K/uL    Lymphocytes Absolute 0.7 (L) 1.2 - 3.7 K/uL    Monocytes Absolute 0.4 0.2 - 0.9 K/uL    Eosinophils Absolute 0.0 0.0 - 0.4 K/uL    Basophils Absolute 0.0 0.0 - 0.1 K/uL   Potassium   Result Value Ref Range    Potassium 4.1 3.4 - 4.9 mEq/L   Hepatitis Panel, Acute   Result Value Ref Range    Hepatitis B Surface Ag NONREACTIVE NR    Hepatitis C Ab NONREACTIVE NR    Hep B Core Ab, IgM NONREACTIVE NR    Hep A IgM NONREACTIVE NR   POCT Glucose   Result Value Ref Range    POC Glucose 199 (H) 70 - 99 mg/dl    Performed on ACCU-CHEK    EKG 12 Lead   Result Value Ref Range    Ventricular Rate 94 BPM    Atrial Rate 94 BPM    P-R Interval 142 ms    QRS Duration 68 ms    Q-T Interval 356 ms    QTc Calculation (Bazett) 445 ms    P Axis 38 degrees    R Axis 72 degrees    T Axis 41 degrees       Diet:  ADULT DIET;  Regular    Activity:  Activity as tolerated (Patient may move about with assist as indicated or with supervision.)    Follow-up:  in 1 weeks with pulmonary service     Disposition: home    Condition: Stable    Time Spent: 50 minutes    Electronically signed by Lien Chowdary MD on 3/1/2022 at 7:41 AM    Discharging Hospitalist

## 2022-03-05 LAB
BLOOD CULTURE, ROUTINE: NORMAL
CULTURE, BLOOD 2: NORMAL

## 2022-05-12 ENCOUNTER — HOSPITAL ENCOUNTER (EMERGENCY)
Age: 52
Discharge: HOME | End: 2022-05-12
Attending: EMERGENCY MEDICINE

## 2022-05-12 ENCOUNTER — APPOINTMENT (OUTPATIENT)
Dept: GENERAL RADIOLOGY | Age: 52
End: 2022-05-12

## 2022-05-12 VITALS
WEIGHT: 290 LBS | BODY MASS INDEX: 60.87 KG/M2 | DIASTOLIC BLOOD PRESSURE: 82 MMHG | OXYGEN SATURATION: 99 % | TEMPERATURE: 98 F | SYSTOLIC BLOOD PRESSURE: 149 MMHG | HEART RATE: 100 BPM | RESPIRATION RATE: 20 BRPM | HEIGHT: 58 IN

## 2022-05-12 DIAGNOSIS — S86.911A STRAIN OF RIGHT KNEE, INITIAL ENCOUNTER: Primary | ICD-10-CM

## 2022-05-12 PROCEDURE — 6370000000 HC RX 637 (ALT 250 FOR IP): Performed by: EMERGENCY MEDICINE

## 2022-05-12 PROCEDURE — 73564 X-RAY EXAM KNEE 4 OR MORE: CPT

## 2022-05-12 PROCEDURE — 99283 EMERGENCY DEPT VISIT LOW MDM: CPT

## 2022-05-12 RX ORDER — NAPROXEN 500 MG/1
500 TABLET ORAL ONCE
Status: COMPLETED | OUTPATIENT
Start: 2022-05-12 | End: 2022-05-12

## 2022-05-12 RX ORDER — TRAMADOL HYDROCHLORIDE 50 MG/1
50 TABLET ORAL EVERY 6 HOURS PRN
Qty: 10 TABLET | Refills: 0 | Status: SHIPPED | OUTPATIENT
Start: 2022-05-12 | End: 2022-05-15

## 2022-05-12 RX ORDER — NAPROXEN 500 MG/1
500 TABLET ORAL 2 TIMES DAILY
Qty: 10 TABLET | Refills: 0 | Status: SHIPPED | OUTPATIENT
Start: 2022-05-12

## 2022-05-12 RX ADMIN — NAPROXEN 500 MG: 500 TABLET ORAL at 17:38

## 2022-05-12 ASSESSMENT — PAIN SCALES - GENERAL
PAINLEVEL_OUTOF10: 6
PAINLEVEL_OUTOF10: 5
PAINLEVEL_OUTOF10: 8

## 2022-05-12 ASSESSMENT — PAIN DESCRIPTION - PAIN TYPE: TYPE: ACUTE PAIN

## 2022-05-12 ASSESSMENT — PAIN DESCRIPTION - LOCATION
LOCATION: KNEE

## 2022-05-12 ASSESSMENT — PAIN DESCRIPTION - ONSET: ONSET: ON-GOING

## 2022-05-12 ASSESSMENT — PAIN DESCRIPTION - FREQUENCY: FREQUENCY: CONTINUOUS

## 2022-05-12 ASSESSMENT — PAIN - FUNCTIONAL ASSESSMENT
PAIN_FUNCTIONAL_ASSESSMENT: 0-10
PAIN_FUNCTIONAL_ASSESSMENT: 0-10

## 2022-05-12 ASSESSMENT — PAIN DESCRIPTION - ORIENTATION
ORIENTATION: RIGHT

## 2022-05-12 ASSESSMENT — PAIN DESCRIPTION - DESCRIPTORS
DESCRIPTORS: SHARP
DESCRIPTORS: SORE
DESCRIPTORS: PRESSURE;BURNING

## 2022-05-12 NOTE — ED NOTES
Ace wrap applied to right knee as knee immobilizer would not fit, Dr. Casey Cesar aware. MSP intact before and after placement. Patient demonstrated proper crutch walking with no signs or symptoms of pain or distress noted.      Taty Tang RN  05/12/22 1759

## 2022-05-12 NOTE — ED PROVIDER NOTES
[facility] ED  EMERGENCY DEPARTMENT ENCOUNTER      Pt Name: Neto Borjas  MRN: [id number]  [place] 1970  Date of evaluation: 2022  Provider: Jacky Fink DO    CHIEF COMPLAINT       Chief Complaint   Patient presents with    Knee Pain     Right knee pain x4 days     Chief complaint: Right knee pain  History of chief complaint: This 45-year-old female presents the emergency department complaining of injuring her right knee 4 days ago. Patient states she was stepping her leg into the car and her toe got caught outside of the car and she fell inward on her knee. Patient states she felt something pop has had burning pain discomfort along the anterior medial aspect of the left knee since that time. Patient states is worse with weightbearing. Patient denies any numb tingling or weakness to the extremity no posterior knee pain or lower leg pain. Patient states she has been trying to ice it at home and rested as possible without significant improvement. Nursing Notes were reviewed. REVIEW OF SYSTEMS    (2-9 systems for level 4, 10 or more for level 5)     Review of Systems  Pertinent findings are given into the history of present illness  Except as noted above the remainder of the review of systems was reviewed and negative.        PAST MEDICAL HISTORY     Past Medical History:   Diagnosis Date    [facility]bral [facility] occlusion [facility]     COPD (chronic obstructive pulmonary disease) ([address]     MI (myocardial infarction) ([address]     MS (multiple sclerosis) ([facility]     TB (tuberculosis)          SURGICAL HISTORY       Past Surgical History:   Procedure Laterality Date     SECTION      ORTHOPEDIC SURGERY      Thumb         CURRENT MEDICATIONS       Previous Medications    ALBUTEROL (PROVENTIL) (5 MG/ML) 0.5% NEBULIZER SOLUTION    Take 0.5 mLs by nebulization every 6 hours as needed for Wheezing    ALBUTEROL SULFATE  (90 BASE) MCG/ACT INHALER    Use 2 puffs 4 times daily for 7 days then as needed for wheezing. Dispense with Spacer and instruct in use. At patient's preference may use 60 dose MDI. May Sub Pro-Air or Proventil as needed per insurance. NICOTINE (NICODERM CQ) 21 MG/24HR    Place 1 patch onto the skin daily       ALLERGIES     Tape Paula Crespo tape]    FAMILY HISTORY       Family History   Problem Relation Age of Onset    Asthma Mother     High Blood Pressure Mother     High Cholesterol Mother     Arthritis Father     Heart Disease Father     Arthritis Brother     Arthritis Brother           SOCIAL HISTORY       Social History     Socioeconomic History    Marital status:      Spouse name: None    Number of children: None    Years of education: None    Highest education level: None   Occupational History    None   Tobacco Use    Smoking status: Former Smoker     Packs/day: 1.00     Quit date: 2022     Years since quittin.0    Smokeless tobacco: Never Used   Vaping Use    Vaping Use: Never used   Substance and Sexual Activity    Alcohol use: No    Drug use: No    Sexual activity: None   Other Topics Concern    None   Social History Narrative    None     Social Determinants of Health     Financial Resource Strain:     Difficulty of Paying Living Expenses: Not on file   Food Insecurity:     Worried About Running Out of Food in the Last Year: Not on file    Giovana of Food in the Last Year: Not on file   Transportation Needs:     Lack of Transportation (Medical): Not on file    Lack of Transportation (Non-Medical):  Not on file   Physical Activity:     Days of Exercise per Week: Not on file    Minutes of Exercise per Session: Not on file   Stress:     Feeling of Stress : Not on file   Social Connections:     Frequency of Communication with Friends and Family: Not on file    Frequency of Social Gatherings with Friends and Family: Not on file    Attends Faith Services: Not on file   [employer] of [employer]s or Organizations: Not on file    Attends Club or Organization Meetings: Not on file    Marital Status: Not on file   Intimate Partner Violence:     Fear of Current or Ex-Partner: Not on file    Emotionally Abused: Not on file    Physically Abused: Not on file    Sexually Abused: Not on file   Housing Stability:     Unable to Pay for Housing in the Last Year: Not on file    Number of [place] in the Last Year: Not on file    Unstable Housing in the Last Year: Not on file           PHYSICAL EXAM    (up to 7 for level 4, 8 or more for level 5)     ED Triage Vitals [05/12/22 1708]   BP Temp Temp Source Pulse Resp SpO2 Height Weight   (!) 149/82 98 °F (36.7 °C) Oral 100 20 96 % 4' 10\" (1.473 m) 290 lb (131.5 kg)       Physical Exam   Normal appearance: Patient is awake alert interactive appropriate nontoxic in no distress  Heart is regular rate and rhythm  Lungs are clear to auscultation with equal breast bilaterally  Right knee: There is reproducible tenderness to palpation along the anterior medial aspect of the right knee there is no gross erythema ecchymosis warmth or swelling. Range of motion is full and intact with increased pain with full flexion. There is no ligamentous laxity. Motor sensory and pulses are intact distally joints above and below are normal    DIAGNOSTIC RESULTS       RADIOLOGY:   Non-plain film images such as CT, Ultrasound and MRI are read by the radiologist. Plain radiographic images are visualized and preliminarily interpreted by the emergency physician with the below findings:    X-ray of the right knee multiple views interpreted by ED physician indication trauma: There are no acute bony fractures there is normal joint alignment no dislocation.   Official radiology report is pending    Interpretation per the Radiologist below, if available at the time of this note:    XR KNEE RIGHT (MIN 4 VIEWS)    (Results Pending)         EMERGENCY DEPARTMENT COURSE and DIFFERENTIAL DIAGNOSIS/MDM:   Vitals:    Vitals:    05/12/22 1708   BP: (!) 149/82   Pulse: 100   Resp: 20   Temp: 98 °F (36.7 °C)   TempSrc: Oral   SpO2: 96%   Weight: 290 lb (131.5 kg)   Height: 4' 10\" (1.473 m)     Treatment and course: Patient was placed in a knee immobilizer crutches were given Naprosyn 500 mg p.o. was initiated here      FINAL IMPRESSION      1. Strain of right knee, initial encounter          DISPOSITION/PLAN   DISPOSITION    Patient discharged home advised rest ice elevate use the knee immobilizer for support crutches to limit weightbearing. Home-going prescription for Naprosyn No. 10 Ultram No. 10 were written and advised to return if any change or worsening increasing pain swelling discoloration numb tingling or weakness. Patient to follow-up with primary physician for repeat assessment in the next 2 to 3 days    PATIENT REFERRED TO:  Shira Palacio 53  [address] [facility]. [id number]          DISCHARGE MEDICATIONS:  New Prescriptions    NAPROXEN (NAPROSYN) 500 MG TABLET    Take 1 tablet by mouth 2 times daily    TRAMADOL (ULTRAM) 50 MG TABLET    Take 1 tablet by mouth every 6 hours as needed for Pain for up to 3 days. Intended supply: 5 days. Take lowest dose possible to manage pain     Controlled Substances Monitoring:     No flowsheet data found.     (Please note that portions of this note were completed with a voice recognition program.  Efforts were made to edit the dictations but occasionally words are mis-transcribed.)    Monica Mishra DO (electronically signed)  Attending Emergency Physician            Monica Mishra DO  05/12/22 7713

## 2022-08-23 NOTE — ED TRIAGE NOTES
Right knee pain x4 days, patient states she twisted it while getting into car. States she heard a \"pop\" and knee feels \"heavy\". A/0 x3, ambulatory, resps even and unlabored on room air.
Pt seen during prior admission (July 2022) at [facility] at bedside, in which a regular diet & thin fluids was RX

## 2023-01-10 ENCOUNTER — APPOINTMENT (OUTPATIENT)
Dept: GENERAL RADIOLOGY | Age: 53
End: 2023-01-10

## 2023-01-10 ENCOUNTER — HOSPITAL ENCOUNTER (EMERGENCY)
Age: 53
Discharge: HOME | End: 2023-01-11
Attending: EMERGENCY MEDICINE

## 2023-01-10 DIAGNOSIS — J20.9 ACUTE BRONCHITIS, UNSPECIFIED ORGANISM: Primary | ICD-10-CM

## 2023-01-10 DIAGNOSIS — J44.1 COPD WITH ACUTE EXACERBATION (HCC): ICD-10-CM

## 2023-01-10 LAB
ALBUMIN SERPL-MCNC: 3.3 G/DL (ref 3.5–4.6)
ALP BLD-CCNC: 162 U/L (ref 40–130)
ALT SERPL-CCNC: 129 U/L (ref 0–33)
ANION GAP SERPL CALCULATED.3IONS-SCNC: 10 MEQ/L (ref 9–15)
AST SERPL-CCNC: 73 U/L (ref 0–35)
BASOPHILS ABSOLUTE: 0.1 K/UL (ref 0–0.1)
BASOPHILS RELATIVE PERCENT: 0.6 % (ref 0.1–1.2)
BILIRUB SERPL-MCNC: 0.6 MG/DL (ref 0.2–0.7)
BUN BLDV-MCNC: 15 MG/DL (ref 6–20)
CALCIUM SERPL-MCNC: 9.8 MG/DL (ref 8.5–9.9)
CHLORIDE BLD-SCNC: 103 MEQ/L (ref 95–107)
CO2: 26 MEQ/L (ref 20–31)
CREAT SERPL-MCNC: 0.67 MG/DL (ref 0.5–0.9)
D DIMER: 1.47 MG/L FEU (ref 0–0.5)
EOSINOPHILS ABSOLUTE: 0.3 K/UL (ref 0–0.4)
EOSINOPHILS RELATIVE PERCENT: 3.4 % (ref 0.7–5.8)
GFR SERPL CREATININE-BSD FRML MDRD: >60 ML/MIN/{1.73_M2}
GLOBULIN: 3.9 G/DL (ref 2.3–3.5)
GLUCOSE BLD-MCNC: 140 MG/DL (ref 70–99)
HCT VFR BLD CALC: 48.1 % (ref 37–47)
HEMOGLOBIN: 15.7 G/DL (ref 11.2–15.7)
IMMATURE GRANULOCYTES #: 0.2 K/UL
IMMATURE GRANULOCYTES %: 2.4 %
INFLUENZA A BY PCR: NEGATIVE
INFLUENZA B BY PCR: NEGATIVE
LYMPHOCYTES ABSOLUTE: 1.6 K/UL (ref 1.2–3.7)
LYMPHOCYTES RELATIVE PERCENT: 18.9 %
MAGNESIUM: 2.1 MG/DL (ref 1.7–2.4)
MCH RBC QN AUTO: 30.8 PG (ref 25.6–32.2)
MCHC RBC AUTO-ENTMCNC: 32.6 % (ref 32.2–35.5)
MCV RBC AUTO: 94.3 FL (ref 79.4–94.8)
MONOCYTES ABSOLUTE: 1.3 K/UL (ref 0.2–0.9)
MONOCYTES RELATIVE PERCENT: 14.5 % (ref 4.7–12.5)
NEUTROPHILS ABSOLUTE: 5.2 K/UL (ref 1.6–6.1)
NEUTROPHILS RELATIVE PERCENT: 60.2 % (ref 34–71.1)
PDW BLD-RTO: 14.2 % (ref 11.7–14.4)
PLATELET # BLD: 184 K/UL (ref 182–369)
POTASSIUM SERPL-SCNC: 3.8 MEQ/L (ref 3.4–4.9)
PRO-BNP: 110 PG/ML
RBC # BLD: 5.1 M/UL (ref 3.93–5.22)
SARS-COV-2, NAAT: NOT DETECTED
SODIUM BLD-SCNC: 139 MEQ/L (ref 135–144)
TOTAL PROTEIN: 7.2 G/DL (ref 6.3–8)
TROPONIN: <0.01 NG/ML (ref 0–0.01)
WBC # BLD: 8.6 K/UL (ref 4–10)

## 2023-01-10 PROCEDURE — 80053 COMPREHEN METABOLIC PANEL: CPT

## 2023-01-10 PROCEDURE — 83880 ASSAY OF NATRIURETIC PEPTIDE: CPT

## 2023-01-10 PROCEDURE — 96374 THER/PROPH/DIAG INJ IV PUSH: CPT

## 2023-01-10 PROCEDURE — 83735 ASSAY OF MAGNESIUM: CPT

## 2023-01-10 PROCEDURE — 36415 COLL VENOUS BLD VENIPUNCTURE: CPT

## 2023-01-10 PROCEDURE — 85379 FIBRIN DEGRADATION QUANT: CPT

## 2023-01-10 PROCEDURE — 99285 EMERGENCY DEPT VISIT HI MDM: CPT

## 2023-01-10 PROCEDURE — 85025 COMPLETE CBC W/AUTO DIFF WBC: CPT

## 2023-01-10 PROCEDURE — 87502 INFLUENZA DNA AMP PROBE: CPT

## 2023-01-10 PROCEDURE — 2580000003 HC RX 258: Performed by: EMERGENCY MEDICINE

## 2023-01-10 PROCEDURE — 84484 ASSAY OF TROPONIN QUANT: CPT

## 2023-01-10 PROCEDURE — 6360000002 HC RX W HCPCS: Performed by: EMERGENCY MEDICINE

## 2023-01-10 PROCEDURE — 93005 ELECTROCARDIOGRAM TRACING: CPT

## 2023-01-10 PROCEDURE — 71045 X-RAY EXAM CHEST 1 VIEW: CPT

## 2023-01-10 PROCEDURE — 87635 SARS-COV-2 COVID-19 AMP PRB: CPT

## 2023-01-10 RX ORDER — 0.9 % SODIUM CHLORIDE 0.9 %
1000 INTRAVENOUS SOLUTION INTRAVENOUS ONCE
Status: COMPLETED | OUTPATIENT
Start: 2023-01-10 | End: 2023-01-11

## 2023-01-10 RX ORDER — BENZONATATE 100 MG/1
100 CAPSULE ORAL 2 TIMES DAILY PRN
Qty: 20 CAPSULE | Refills: 0 | Status: SHIPPED | OUTPATIENT
Start: 2023-01-10 | End: 2023-01-17

## 2023-01-10 RX ORDER — NICOTINE 21 MG/24HR
1 PATCH, TRANSDERMAL 24 HOURS TRANSDERMAL DAILY
Qty: 30 PATCH | Refills: 3 | Status: SHIPPED | OUTPATIENT
Start: 2023-01-10

## 2023-01-10 RX ORDER — METHYLPREDNISOLONE SODIUM SUCCINATE 125 MG/2ML
125 INJECTION, POWDER, LYOPHILIZED, FOR SOLUTION INTRAMUSCULAR; INTRAVENOUS ONCE
Status: COMPLETED | OUTPATIENT
Start: 2023-01-10 | End: 2023-01-10

## 2023-01-10 RX ORDER — ALBUTEROL SULFATE 90 UG/1
AEROSOL, METERED RESPIRATORY (INHALATION)
Qty: 1 EACH | Refills: 3 | Status: SHIPPED | OUTPATIENT
Start: 2023-01-10

## 2023-01-10 RX ORDER — PREDNISONE 50 MG/1
50 TABLET ORAL DAILY
Qty: 5 TABLET | Refills: 0 | Status: SHIPPED | OUTPATIENT
Start: 2023-01-10 | End: 2023-01-15

## 2023-01-10 RX ORDER — GUAIFENESIN 600 MG/1
1200 TABLET, EXTENDED RELEASE ORAL 2 TIMES DAILY
Qty: 40 TABLET | Refills: 0 | Status: SHIPPED | OUTPATIENT
Start: 2023-01-10 | End: 2023-01-20

## 2023-01-10 RX ORDER — AZITHROMYCIN 250 MG/1
TABLET, FILM COATED ORAL
Qty: 1 PACKET | Refills: 0 | Status: SHIPPED | OUTPATIENT
Start: 2023-01-10 | End: 2023-01-14

## 2023-01-10 RX ADMIN — SODIUM CHLORIDE 1000 ML: 9 INJECTION, SOLUTION INTRAVENOUS at 23:15

## 2023-01-10 RX ADMIN — METHYLPREDNISOLONE SODIUM SUCCINATE 125 MG: 125 INJECTION, POWDER, FOR SOLUTION INTRAMUSCULAR; INTRAVENOUS at 23:16

## 2023-01-10 ASSESSMENT — ENCOUNTER SYMPTOMS
PHOTOPHOBIA: 0
NAUSEA: 0
CONSTIPATION: 0
SHORTNESS OF BREATH: 1
BACK PAIN: 0
COUGH: 1
COLOR CHANGE: 0
WHEEZING: 0
VOMITING: 0
ABDOMINAL DISTENTION: 0
APNEA: 0
SINUS PRESSURE: 0
ABDOMINAL PAIN: 0
RHINORRHEA: 0
SORE THROAT: 0
DIARRHEA: 0
EYE PAIN: 0

## 2023-01-10 ASSESSMENT — PAIN SCALES - GENERAL: PAINLEVEL_OUTOF10: 4

## 2023-01-10 ASSESSMENT — PAIN DESCRIPTION - ONSET: ONSET: SUDDEN

## 2023-01-10 ASSESSMENT — PAIN DESCRIPTION - PAIN TYPE: TYPE: ACUTE PAIN

## 2023-01-10 ASSESSMENT — PAIN DESCRIPTION - FREQUENCY: FREQUENCY: CONTINUOUS

## 2023-01-10 ASSESSMENT — PAIN DESCRIPTION - LOCATION: LOCATION: RIB CAGE

## 2023-01-10 ASSESSMENT — PAIN DESCRIPTION - ORIENTATION: ORIENTATION: RIGHT;LOWER

## 2023-01-10 ASSESSMENT — PAIN DESCRIPTION - DESCRIPTORS: DESCRIPTORS: SQUEEZING

## 2023-01-11 ENCOUNTER — APPOINTMENT (OUTPATIENT)
Dept: CT IMAGING | Age: 53
End: 2023-01-11

## 2023-01-11 VITALS
DIASTOLIC BLOOD PRESSURE: 92 MMHG | HEART RATE: 86 BPM | SYSTOLIC BLOOD PRESSURE: 137 MMHG | WEIGHT: 293 LBS | TEMPERATURE: 97.9 F | BODY MASS INDEX: 59.07 KG/M2 | HEIGHT: 59 IN | RESPIRATION RATE: 20 BRPM | OXYGEN SATURATION: 93 %

## 2023-01-11 LAB
BACTERIA: ABNORMAL /HPF
BILIRUBIN URINE: ABNORMAL
BLOOD, URINE: ABNORMAL
CLARITY: CLEAR
COLOR: ABNORMAL
EKG ATRIAL RATE: 95 BPM
EKG P AXIS: 38 DEGREES
EKG P-R INTERVAL: 162 MS
EKG Q-T INTERVAL: 358 MS
EKG QRS DURATION: 70 MS
EKG QTC CALCULATION (BAZETT): 449 MS
EKG R AXIS: 74 DEGREES
EKG T AXIS: 31 DEGREES
EKG VENTRICULAR RATE: 95 BPM
EPITHELIAL CELLS, UA: ABNORMAL /HPF
GLUCOSE URINE: NEGATIVE MG/DL
KETONES, URINE: ABNORMAL MG/DL
LEUKOCYTE ESTERASE, URINE: NEGATIVE
NITRITE, URINE: NEGATIVE
PH UA: 5.5 (ref 5–9)
PROTEIN UA: ABNORMAL MG/DL
RBC UA: ABNORMAL /HPF (ref 0–2)
SPECIFIC GRAVITY UA: 1.02 (ref 1–1.03)
URINE REFLEX TO CULTURE: ABNORMAL
UROBILINOGEN, URINE: >=8 E.U./DL
WBC UA: ABNORMAL /HPF (ref 0–5)

## 2023-01-11 PROCEDURE — 71275 CT ANGIOGRAPHY CHEST: CPT

## 2023-01-11 PROCEDURE — 6360000004 HC RX CONTRAST MEDICATION: Performed by: EMERGENCY MEDICINE

## 2023-01-11 PROCEDURE — 81001 URINALYSIS AUTO W/SCOPE: CPT

## 2023-01-11 PROCEDURE — 93010 ELECTROCARDIOGRAM REPORT: CPT | Performed by: INTERNAL MEDICINE

## 2023-01-11 RX ADMIN — IOPAMIDOL 75 ML: 755 INJECTION, SOLUTION INTRAVENOUS at 00:19

## 2023-01-11 NOTE — ED PROVIDER NOTES
[address] [place] [address] ED  eMERGENCY dEPARTMENT eNCOUnter      Pt Name: Carmen Talley  MRN: [id number]  [place] 1970  Date of evaluation: 1/10/2023  Provider: Gisel Farrell MD    CHIEF COMPLAINT       Chief Complaint   Patient presents with    Shortness of Breath     Patient states that she has been short of breath for past week with no relief. HISTORY OF PRESENT ILLNESS   (Location/Symptom, Timing/Onset,Context/Setting, Quality, Duration, Modifying Factors, Severity)  Note limiting factors. Carmen Talley is a 47 y.o. female who presents to the emergency department with complaint of shortness of breath off and on for the last 1 week. Patient has been limiting her cigarette smoking from 2 packs a day to 18 cigarettes a day. Known history of COPD. Other comorbid conditions includes morbid obesity, osteoarthritis. She denies chest pain, palpitations, orthopnea or PND. Cough is mostly nonproductive. Denies fever or chills. No nausea or vomiting. BMI is 59.18. She was immunized against COVID but not flu. HPI    Nursing Notes were reviewed. REVIEW OF SYSTEMS    (2-9 systems for level 4, 10 or more for level 5)     Review of Systems   Constitutional: Negative. Negative for activity change, appetite change, chills, fatigue and fever. HENT:  Positive for congestion. Negative for ear discharge, ear pain, hearing loss, rhinorrhea, sinus pressure and sore throat. Eyes:  Negative for photophobia, pain and visual disturbance. Respiratory:  Positive for cough and shortness of breath. Negative for apnea and wheezing. Cardiovascular:  Negative for chest pain, palpitations and leg swelling. Gastrointestinal:  Negative for abdominal distention, abdominal pain, constipation, diarrhea, nausea and vomiting. Endocrine: Negative for cold intolerance, heat intolerance and polyuria. Genitourinary:  Negative for dysuria, flank pain, frequency and urgency.    Musculoskeletal:  Positive for arthralgias. Negative for back pain, gait problem, myalgias and neck stiffness. Skin:  Negative for color change, pallor and rash. Allergic/Immunologic: Negative for food allergies and immunocompromised state. Neurological:  Negative for dizziness, tremors, syncope, weakness, light-headedness and headaches. Hematological:  Negative for adenopathy. Does not bruise/bleed easily. Psychiatric/Behavioral:  Negative for agitation, confusion and hallucinations. All other systems reviewed and are negative. Except as noted above the remainder of the review of systems was reviewed and negative.        PAST MEDICAL HISTORY     Past Medical History:   Diagnosis Date    Cerebral artery occlusion with cerebral infarction ([facility]     COPD (chronic obstructive pulmonary disease) ([facility])     [facility]     MS (multiple sclerosis) [facility]     TB (tuberculosis)          SURGICAL HISTORY       Past Surgical History:   Procedure Laterality Date     SECTION      ORTHOPEDIC SURGERY      Thumb         CURRENT MEDICATIONS       Previous Medications    NAPROXEN (NAPROSYN) 500 MG TABLET    Take 1 tablet by mouth 2 times daily       ALLERGIES     Tape [adhesive tape]    FAMILY HISTORY       Family History   Problem Relation Age of Onset    Asthma Mother     High Blood Pressure Mother     High Cholesterol Mother     Arthritis Father     Heart Disease Father     Arthritis Brother     Arthritis Brother           SOCIAL HISTORY       Social History     Socioeconomic History    Marital status:      Spouse name: None    Number of children: None    Years of education: None    Highest education level: None   Tobacco Use    Smoking status: Former     Packs/day: 1.00     Types: Cigarettes     Quit date: 2022     Years since quittin.7    Smokeless tobacco: Never   Vaping Use    Vaping Use: Never used   Substance and Sexual Activity    Alcohol use: No    Drug use: No       SCREENINGS    Nara Coma Scale  Eye Opening: Spontaneous  Best Verbal Response: Oriented  Best Motor Response: Obeys commands  Nara Coma Scale Score: 15        PHYSICAL EXAM    (up to 7 for level 4, 8 or more for level 5)     ED Triage Vitals [01/10/23 2304]   BP Temp Temp Source Heart Rate Resp SpO2 Height Weight   134/68 97.9 °F (36.6 °C) Oral 94 20 97 % 4' 11\" (1.499 m) 293 lb (132.9 kg)       Physical Exam  Vitals and nursing note reviewed. Constitutional:       General: She is not in acute distress. Appearance: Normal appearance. She is well-developed. She is obese. She is not ill-appearing, toxic-appearing or diaphoretic. Interventions: She is not intubated. HENT:      Head: Normocephalic and atraumatic. Nose: Nose normal. No congestion or rhinorrhea. Mouth/Throat:      Mouth: Mucous membranes are moist.      Pharynx: Oropharynx is clear. No pharyngeal swelling, oropharyngeal exudate or posterior oropharyngeal erythema. Eyes:      General: No scleral icterus. Right eye: No discharge. Left eye: No discharge. Extraocular Movements: Extraocular movements intact. Conjunctiva/sclera: Conjunctivae normal.      Pupils: Pupils are equal, round, and reactive to light. Neck:      Thyroid: No thyromegaly. Vascular: No carotid bruit, hepatojugular reflux or JVD. Trachea: No tracheal deviation. Cardiovascular:      Rate and Rhythm: Normal rate and regular rhythm. No extrasystoles are present. Pulses: Normal pulses. No decreased pulses. Heart sounds: Normal heart sounds. No murmur heard. No friction rub. No gallop. Pulmonary:      Effort: Pulmonary effort is normal. No tachypnea, bradypnea, accessory muscle usage or respiratory distress. She is not intubated. Breath sounds: Normal breath sounds. No stridor. No decreased breath sounds, wheezing, rhonchi or rales. Chest:      Chest wall: No mass, deformity, tenderness, crepitus or edema.  There is no dullness to percussion. Abdominal:      General: Abdomen is flat. Bowel sounds are normal. There is no distension. Palpations: Abdomen is soft. There is no hepatomegaly, splenomegaly or mass. Tenderness: There is no abdominal tenderness. There is no right CVA tenderness, left CVA tenderness, guarding or rebound. Hernia: No hernia is present. Musculoskeletal:         General: No swelling, deformity or signs of injury. Normal range of motion. Cervical back: Normal range of motion and neck supple. No rigidity or tenderness. Right lower leg: No tenderness. No edema. Left lower leg: No tenderness. No edema. Lymphadenopathy:      Cervical: No cervical adenopathy. Skin:     General: Skin is warm and dry. Capillary Refill: Capillary refill takes less than 2 seconds. Coloration: Skin is not cyanotic, jaundiced or pale. Findings: No bruising, ecchymosis, erythema, lesion or rash. Nails: There is no clubbing. Neurological:      General: No focal deficit present. Mental Status: She is alert and oriented to person, place, and time. Mental status is at baseline. Cranial Nerves: No cranial nerve deficit. Sensory: No sensory deficit. Motor: No weakness or abnormal muscle tone. Coordination: Coordination normal.      Gait: Gait normal.      Deep Tendon Reflexes: Reflexes are normal and symmetric. Reflexes normal.   Psychiatric:         Mood and Affect: Mood normal. Mood is not anxious. Behavior: Behavior normal. Behavior is not agitated. Thought Content: Thought content normal.         Judgment: Judgment normal.       DIAGNOSTIC RESULTS     EKG: All EKG's are interpreted by the Emergency Department Physician who either signs or Co-signs this chart in the absence of a cardiologist.    Twelve-lead EKG shows normal sinus rhythm, right 95 bpm, normal intervals, normal electrical axis, no acute ST-T wave changes. My interpretation.     RADIOLOGY: Non-plain film images such as CT, Ultrasound and MRI are read by the radiologist. [place] Common radiographicimages are visualized and preliminarily interpreted by the emergency physician with the below findings:    Chest x-ray showed no acute cardio or pulmonary process. My interpretation    Interpretation per the Radiologist below, if available at the time of this note:    XR CHEST PORTABLE   Final Result   No acute disease. RECOMMENDATION:   Careful clinical correlation and follow up recommended. CTA CHEST W WO CONTRAST PE Eval    (Results Pending)         ED BEDSIDE ULTRASOUND:   Performed by ED Physician - none    LABS:  Labs Reviewed   COMPREHENSIVE METABOLIC PANEL - Abnormal; Notable for the following components:       Result Value    Glucose 140 (*)     Albumin 3.3 (*)     Alkaline Phosphatase 162 (*)      (*)     AST 73 (*)     Globulin 3.9 (*)     All other components within normal limits   CBC WITH AUTO DIFFERENTIAL - Abnormal; Notable for the following components:    Hematocrit 48.1 (*)     Monocytes % 14.5 (*)     Monocytes Absolute 1.3 (*)     All other components within normal limits   URINALYSIS WITH REFLEX TO CULTURE - Abnormal; Notable for the following components:    Urobilinogen, Urine >=8.0 (*)     All other components within normal limits   D-DIMER, QUANTITATIVE - Abnormal; Notable for the following components:    D-Dimer, Quant 1.47 (*)     All other components within normal limits    Narrative:     Adelaida HALE tel. [phone],  Coag results called to and read back by Coleen, 01/10/2023 23:47, by [address] [place] [address] - Abnormal; Notable for the following components:    RBC, UA 3-5 (*)     Bacteria, UA MODERATE (*)     All other components within normal limits   COVID-19, RAPID   RAPID INFLUENZA A/B ANTIGENS   MAGNESIUM   TROPONIN   BRAIN NATRIURETIC PEPTIDE       All other labs were within normal range or not returned as of this dictation.     EMERGENCY DEPARTMENT COURSE and DIFFERENTIALDIAGNOSIS/MDM:    Differential diagnosis includes #1 acute exacerbation of COPD. #2 acute bronchitis #3 acute coronary syndrome #4 acute viral illness-COVID-19, influenza. Viral screen was negative for Influenza and COVID. Chest x-ray shows COPD pattern but no acute consolidative infiltrates. No acute acute cardiopulmonary pathology. Troponin and twelve-lead EKG did not suggest acute coronary syndrome. D dimer was elevated at 1.46. Completed a CT PE studies that is negative for pulmonary embolism, showing some mild scattered areas of groundglass opacities greater in the upper lobes. Concerning for infectious versus inflammatory process. Patient was treated with Solu-Medrol and bronchodilator therapy with good relief of symptoms. She will be discharged Z-Cecilio, Mucinex, Tessalon Perles and prednisone. She was advised to continue with her rescue inhalers as already prescribed. Cigarette smoking cessation and options were again emphasized. She was advised to follow-up with family doctor within 3 days and to come back to the ED for new or worsening symptoms. She was agreeable with plan of care.    Vitals:    Vitals:    01/10/23 2304 01/11/23 0030   BP: 134/68 (!) 137/92   Pulse: 94 86   Resp: 20 20   Temp: 97.9 °F (36.6 °C)    TempSrc: Oral    SpO2: 97% 93%   Weight: 293 lb (132.9 kg)    Height: 4' 11\" (1.499 m)            MDM     Amount and/or Complexity of Data Reviewed  Clinical lab tests: ordered and reviewed  Tests in the radiology section of CPT®: reviewed and ordered  Tests in the medicine section of CPT®: reviewed and ordered  Review and summarize past medical records: yes  Independent visualization of images, tracings, or specimens: yes    Risk of Complications, Morbidity, and/or Mortality  Presenting problems: moderate  Diagnostic procedures: moderate  Management options: moderate    Patient Progress  Patient progress: improved      CRITICAL CARE TIME   Total Critical Care time was  minutes, excluding separately reportable procedures. There was a high probability of clinically significant/life threatening deterioration in the patient's condition which required my urgentintervention. CONSULTS:  None    PROCEDURES:  Unless otherwise noted below, none     Procedures    FINAL IMPRESSION      1. Acute bronchitis, unspecified organism    2. COPD with acute exacerbation [facility])          DISPOSITION/PLAN   DISPOSITION Discharge - Pending Orders Complete 01/10/2023 11:34:27 PM      PATIENT REFERRED TO:  Kyaw Murphy MD  [place] [address] [phone]    In 3 days      DISCHARGE MEDICATIONS:  New Prescriptions    AZITHROMYCIN (ZITHROMAX Z-IRVING) 250 MG TABLET    Take 2 tablets (500 mg) on Day 1, and then take 1 tablet (250 mg) on days 2 through 5.     BENZONATATE (TESSALON) 100 MG CAPSULE    Take 1 capsule by mouth 2 times daily as needed for Cough    GUAIFENESIN (MUCINEX) 600 MG EXTENDED RELEASE TABLET    Take 2 tablets by mouth 2 times daily for 10 days    PREDNISONE (DELTASONE) 50 MG TABLET    Take 1 tablet by mouth daily for 5 days          (Please note that portions of this note were completed with a voice recognitionprogram.  Efforts were made to edit the dictations but occasionally words are mis-transcribed.)    Hernando Hastings MD (electronically signed)  Attending Emergency Physician          Hernando Hastings MD  01/11/23 2506       Hernando Hastings MD  01/11/23 4307

## 2023-01-11 NOTE — ED TRIAGE NOTES
Pt. Presents to ED with complaints of shortness of breath, cough for a few weeks. Has gotten worse today.

## 2024-03-28 ENCOUNTER — OFFICE VISIT (OUTPATIENT)
Dept: FAMILY MEDICINE CLINIC | Age: 54
End: 2024-03-28
Payer: COMMERCIAL

## 2024-03-28 VITALS
DIASTOLIC BLOOD PRESSURE: 78 MMHG | HEIGHT: 59 IN | BODY MASS INDEX: 59.07 KG/M2 | OXYGEN SATURATION: 99 % | SYSTOLIC BLOOD PRESSURE: 142 MMHG | HEART RATE: 86 BPM | WEIGHT: 293 LBS

## 2024-03-28 DIAGNOSIS — N95.0 POSTMENOPAUSAL BLEEDING: Primary | ICD-10-CM

## 2024-03-28 DIAGNOSIS — F12.90 MARIJUANA USE, CONTINUOUS: ICD-10-CM

## 2024-03-28 DIAGNOSIS — G89.29 CHRONIC MIDLINE LOW BACK PAIN WITHOUT SCIATICA: ICD-10-CM

## 2024-03-28 DIAGNOSIS — M54.50 CHRONIC MIDLINE LOW BACK PAIN WITHOUT SCIATICA: ICD-10-CM

## 2024-03-28 DIAGNOSIS — G89.29 CHRONIC PAIN OF BOTH KNEES: ICD-10-CM

## 2024-03-28 DIAGNOSIS — E66.01 MORBID OBESITY WITH BMI OF 60.0-69.9, ADULT (HCC): ICD-10-CM

## 2024-03-28 DIAGNOSIS — R06.2 WHEEZING: ICD-10-CM

## 2024-03-28 DIAGNOSIS — R06.09 DYSPNEA ON EXERTION: ICD-10-CM

## 2024-03-28 DIAGNOSIS — Z13.220 SCREENING CHOLESTEROL LEVEL: ICD-10-CM

## 2024-03-28 DIAGNOSIS — R05.3 CHRONIC COUGH: ICD-10-CM

## 2024-03-28 DIAGNOSIS — R73.03 PRE-DIABETES: ICD-10-CM

## 2024-03-28 DIAGNOSIS — R10.2 PELVIC PAIN: ICD-10-CM

## 2024-03-28 DIAGNOSIS — G47.10 HYPERSOMNIA: ICD-10-CM

## 2024-03-28 DIAGNOSIS — Z12.31 ENCOUNTER FOR SCREENING MAMMOGRAM FOR MALIGNANT NEOPLASM OF BREAST: ICD-10-CM

## 2024-03-28 DIAGNOSIS — M25.562 CHRONIC PAIN OF BOTH KNEES: ICD-10-CM

## 2024-03-28 DIAGNOSIS — F32.A DEPRESSION, UNSPECIFIED DEPRESSION TYPE: ICD-10-CM

## 2024-03-28 DIAGNOSIS — D69.6 THROMBOCYTOPENIA (HCC): ICD-10-CM

## 2024-03-28 DIAGNOSIS — R79.89 ABNORMAL LFTS (LIVER FUNCTION TESTS): ICD-10-CM

## 2024-03-28 DIAGNOSIS — I10 PRIMARY HYPERTENSION: ICD-10-CM

## 2024-03-28 DIAGNOSIS — R06.83 SNORING: ICD-10-CM

## 2024-03-28 DIAGNOSIS — M25.561 CHRONIC PAIN OF BOTH KNEES: ICD-10-CM

## 2024-03-28 DIAGNOSIS — Z87.42 HISTORY OF ABNORMAL CERVICAL PAP SMEAR: ICD-10-CM

## 2024-03-28 PROBLEM — J44.9 COPD (CHRONIC OBSTRUCTIVE PULMONARY DISEASE) (HCC): Status: ACTIVE | Noted: 2022-02-27

## 2024-03-28 PROBLEM — E66.9 OBESITY: Status: ACTIVE | Noted: 2024-03-28

## 2024-03-28 PROCEDURE — 3077F SYST BP >= 140 MM HG: CPT | Performed by: FAMILY MEDICINE

## 2024-03-28 PROCEDURE — 3078F DIAST BP <80 MM HG: CPT | Performed by: FAMILY MEDICINE

## 2024-03-28 PROCEDURE — 99205 OFFICE O/P NEW HI 60 MIN: CPT | Performed by: FAMILY MEDICINE

## 2024-03-28 RX ORDER — LOSARTAN POTASSIUM 25 MG/1
25 TABLET ORAL DAILY
Qty: 30 TABLET | Refills: 1 | Status: SHIPPED | OUTPATIENT
Start: 2024-03-28

## 2024-03-28 RX ORDER — ALBUTEROL SULFATE 90 UG/1
2 AEROSOL, METERED RESPIRATORY (INHALATION) EVERY 6 HOURS PRN
Qty: 18 G | Refills: 0 | Status: SHIPPED | OUTPATIENT
Start: 2024-03-28

## 2024-03-28 SDOH — ECONOMIC STABILITY: FOOD INSECURITY: WITHIN THE PAST 12 MONTHS, YOU WORRIED THAT YOUR FOOD WOULD RUN OUT BEFORE YOU GOT MONEY TO BUY MORE.: NEVER TRUE

## 2024-03-28 SDOH — ECONOMIC STABILITY: FOOD INSECURITY: WITHIN THE PAST 12 MONTHS, THE FOOD YOU BOUGHT JUST DIDN'T LAST AND YOU DIDN'T HAVE MONEY TO GET MORE.: NEVER TRUE

## 2024-03-28 SDOH — ECONOMIC STABILITY: HOUSING INSECURITY
IN THE LAST 12 MONTHS, WAS THERE A TIME WHEN YOU DID NOT HAVE A STEADY PLACE TO SLEEP OR SLEPT IN A SHELTER (INCLUDING NOW)?: NO

## 2024-03-28 SDOH — ECONOMIC STABILITY: INCOME INSECURITY: HOW HARD IS IT FOR YOU TO PAY FOR THE VERY BASICS LIKE FOOD, HOUSING, MEDICAL CARE, AND HEATING?: NOT HARD AT ALL

## 2024-03-28 ASSESSMENT — ENCOUNTER SYMPTOMS
COUGH: 1
SHORTNESS OF BREATH: 1
VOMITING: 0
NAUSEA: 0
ANAL BLEEDING: 0
CHEST TIGHTNESS: 0
WHEEZING: 1
BACK PAIN: 1
CONSTIPATION: 0
ABDOMINAL PAIN: 0
DIARRHEA: 0
BLOOD IN STOOL: 0

## 2024-03-28 ASSESSMENT — COLUMBIA-SUICIDE SEVERITY RATING SCALE - C-SSRS
6. HAVE YOU EVER DONE ANYTHING, STARTED TO DO ANYTHING, OR PREPARED TO DO ANYTHING TO END YOUR LIFE?: NO
1. WITHIN THE PAST MONTH, HAVE YOU WISHED YOU WERE DEAD OR WISHED YOU COULD GO TO SLEEP AND NOT WAKE UP?: YES
5. HAVE YOU STARTED TO WORK OUT OR WORKED OUT THE DETAILS OF HOW TO KILL YOURSELF? DO YOU INTEND TO CARRY OUT THIS PLAN?: NO
4. HAVE YOU HAD THESE THOUGHTS AND HAD SOME INTENTION OF ACTING ON THEM?: NO
2. HAVE YOU ACTUALLY HAD ANY THOUGHTS OF KILLING YOURSELF?: YES
3. HAVE YOU BEEN THINKING ABOUT HOW YOU MIGHT KILL YOURSELF?: NO

## 2024-03-28 ASSESSMENT — PATIENT HEALTH QUESTIONNAIRE - PHQ9
SUM OF ALL RESPONSES TO PHQ QUESTIONS 1-9: 21
SUM OF ALL RESPONSES TO PHQ QUESTIONS 1-9: 20
SUM OF ALL RESPONSES TO PHQ QUESTIONS 1-9: 21
9. THOUGHTS THAT YOU WOULD BE BETTER OFF DEAD, OR OF HURTING YOURSELF: SEVERAL DAYS
3. TROUBLE FALLING OR STAYING ASLEEP: NEARLY EVERY DAY
SUM OF ALL RESPONSES TO PHQ9 QUESTIONS 1 & 2: 6
SUM OF ALL RESPONSES TO PHQ QUESTIONS 1-9: 21
4. FEELING TIRED OR HAVING LITTLE ENERGY: NEARLY EVERY DAY
10. IF YOU CHECKED OFF ANY PROBLEMS, HOW DIFFICULT HAVE THESE PROBLEMS MADE IT FOR YOU TO DO YOUR WORK, TAKE CARE OF THINGS AT HOME, OR GET ALONG WITH OTHER PEOPLE: SOMEWHAT DIFFICULT
2. FEELING DOWN, DEPRESSED OR HOPELESS: NEARLY EVERY DAY
7. TROUBLE CONCENTRATING ON THINGS, SUCH AS READING THE NEWSPAPER OR WATCHING TELEVISION: SEVERAL DAYS
5. POOR APPETITE OR OVEREATING: NEARLY EVERY DAY
6. FEELING BAD ABOUT YOURSELF - OR THAT YOU ARE A FAILURE OR HAVE LET YOURSELF OR YOUR FAMILY DOWN: NEARLY EVERY DAY
8. MOVING OR SPEAKING SO SLOWLY THAT OTHER PEOPLE COULD HAVE NOTICED. OR THE OPPOSITE, BEING SO FIGETY OR RESTLESS THAT YOU HAVE BEEN MOVING AROUND A LOT MORE THAN USUAL: SEVERAL DAYS
1. LITTLE INTEREST OR PLEASURE IN DOING THINGS: NEARLY EVERY DAY

## 2024-03-28 NOTE — ASSESSMENT & PLAN NOTE
Patient instructed to cut back on Tylenol use to no more than 1 tablet of Tylenol arthritis 3 times daily OR  2 extra strength Tylenol 3 times daily. We will repeat testing to reassess. If AST or ALT >2x normal limits would obtain further hepatic testing, liver U/S, and refer to hepatology.

## 2024-03-28 NOTE — ASSESSMENT & PLAN NOTE
Will obtain CBC and pelvic ultrasound for further evaluation. Referral given to gynecology for further evaluation and likely further management. I stressed with patient the serious nature of this symptom and the potential for underlying malignancy. She was urged to follow-up with the specialist ASAP.

## 2024-03-28 NOTE — ASSESSMENT & PLAN NOTE
Mildly elevated blood pressure today in the office.  She agrees to start losartan for additional blood pressure management and renal protection in the setting of previously noted hyperglycemia.  Patient will return for nurse visit BP/pulse check in 1 week and any further recommendations be made based on updated BP result.  Patient will keep follow-up in the next 4 weeks with me for reassessment.

## 2024-03-28 NOTE — ASSESSMENT & PLAN NOTE
Noted on historical lab results. Will repeat blood counts to reassess. If normal on updated testing would resolve this problem.

## 2024-03-28 NOTE — ASSESSMENT & PLAN NOTE
Secondary to midline pain and reported past history of vertebral compression fracture will obtain x-ray to further evaluate. Referral has been placed to pain management and formal physical therapy for further treatment.

## 2024-03-28 NOTE — ASSESSMENT & PLAN NOTE
Patient counseled on healthy dietary choices and the benefits of a lower salt and/or lower carbohydrate diet as appropriate. Patient also counseled on benefits of moderate intensity cardiovascular exercise for 150 minutes per week as they are able. Advice was given to make small changes over time, setting smaller achievable goals until recommended lifestyle changes are reached. Based on updated lab results we will discuss potential medication for help with weight loss.

## 2024-03-28 NOTE — PROGRESS NOTES
Lakshmi Rebolledo (: 1970) is a 54 y.o. female, New patient, who presents today for:    Chief Complaint   Patient presents with    New Patient     Pt does have a list of things she would like to discuss.          ASSESSMENT/PLAN:    1. Postmenopausal bleeding  Assessment & Plan:  Will obtain CBC and pelvic ultrasound for further evaluation. Referral given to gynecology for further evaluation and likely further management. I stressed with patient the serious nature of this symptom and the potential for underlying malignancy. She was urged to follow-up with the specialist ASAP.    Orders:  -     TSH; Future  -     US PELVIS COMPLETE; Future  -     Bernard Barraza DO, OB-GYN, [place]  2. Pelvic pain  Comments:  In the setting of postmenopausal bleeding patient has been referred to GYN for further management. U/S ordered.  Orders:  -     US PELVIS COMPLETE; Future  -     Bernard Barraza DO, OB-GYN, [place]  3. History of abnormal cervical Pap smear  Assessment & Plan:  Patient denies following up with gynecology as previously instructed by the specialist. New referral given. I stressed with her at length the importance of being seen by gynecology for pelvic exam and repeat pap testing.   Orders:  -     Bernard Barraza DO, OB-GYN, [place]  4. Primary hypertension  Assessment & Plan:  Mildly elevated blood pressure today in the office.  She agrees to start losartan for additional blood pressure management and renal protection in the setting of previously noted hyperglycemia.  Patient will return for nurse visit BP/pulse check in 1 week and any further recommendations be made based on updated BP result.  Patient will keep follow-up in the next 4 weeks with me for reassessment.    Orders:  -     CBC with Auto Differential; Future  -     Comprehensive Metabolic Panel; Future  -     losartan (COZAAR) 25 MG tablet; Take 1 tablet by mouth daily, Disp-30 tablet,

## 2024-03-28 NOTE — ASSESSMENT & PLAN NOTE
Likely related to body habitus and deconditioning as well as suspected obstructive lung disease based on significant smoking history. Will obtain PFTs and make further recommendations based on results.

## 2024-03-28 NOTE — ASSESSMENT & PLAN NOTE
Potential arthritis and likely related to body habitus. Will obtain x-rays to further evaluate. Referral placed to pain management and formal physical therapy for further treatment. Consider ortho referral if there is severe OA.

## 2024-03-28 NOTE — ASSESSMENT & PLAN NOTE
Patient denies following up with gynecology as previously instructed by the specialist. New referral given. I stressed with her at length the importance of being seen by gynecology for pelvic exam and repeat pap testing.

## 2024-03-28 NOTE — ASSESSMENT & PLAN NOTE
Noted hyperglycemia on previous lab work.  Will obtain testing to reassess and determine if patient has blood glucose in the diabetic range.  I stressed with her the importance of aggressive blood glucose management long-term.  Patient was advised to double down on efforts to eat a lower carbohydrate diet.  Any further treatment recommendations will based on updated lab results.

## 2024-03-28 NOTE — ASSESSMENT & PLAN NOTE
No SI/HI currently. Reviewed treatment choices including medication and counseling. Discussed risks/benefits and reviewed medication choices. Pt declines medication, but agrees to consider counseling/therapy. Psychology referral given. Will keep close F/U in the next 1-2 months for reassessment.

## 2024-03-29 ENCOUNTER — TELEPHONE (OUTPATIENT)
Dept: FAMILY MEDICINE CLINIC | Age: 54
End: 2024-03-29

## 2024-03-29 ENCOUNTER — HOSPITAL ENCOUNTER (OUTPATIENT)
Dept: LAB | Age: 54
End: 2024-03-29
Payer: COMMERCIAL

## 2024-03-29 ENCOUNTER — HOSPITAL ENCOUNTER (OUTPATIENT)
Dept: GENERAL RADIOLOGY | Age: 54
End: 2024-03-29
Payer: COMMERCIAL

## 2024-03-29 ENCOUNTER — HOSPITAL ENCOUNTER (OUTPATIENT)
Dept: ULTRASOUND IMAGING | Age: 54
Discharge: HOME | End: 2024-03-29
Payer: COMMERCIAL

## 2024-03-29 DIAGNOSIS — R06.2 WHEEZING: ICD-10-CM

## 2024-03-29 DIAGNOSIS — M25.561 CHRONIC PAIN OF BOTH KNEES: ICD-10-CM

## 2024-03-29 DIAGNOSIS — E66.01 MORBID OBESITY WITH BMI OF 60.0-69.9, ADULT (HCC): ICD-10-CM

## 2024-03-29 DIAGNOSIS — R79.89 ABNORMAL LFTS (LIVER FUNCTION TESTS): ICD-10-CM

## 2024-03-29 DIAGNOSIS — R73.03 PRE-DIABETES: ICD-10-CM

## 2024-03-29 DIAGNOSIS — M54.50 CHRONIC MIDLINE LOW BACK PAIN WITHOUT SCIATICA: ICD-10-CM

## 2024-03-29 DIAGNOSIS — R06.09 DYSPNEA ON EXERTION: ICD-10-CM

## 2024-03-29 DIAGNOSIS — G89.29 CHRONIC PAIN OF BOTH KNEES: ICD-10-CM

## 2024-03-29 DIAGNOSIS — D69.6 THROMBOCYTOPENIA (HCC): ICD-10-CM

## 2024-03-29 DIAGNOSIS — E11.65 TYPE 2 DIABETES MELLITUS WITH HYPERGLYCEMIA, WITHOUT LONG-TERM CURRENT USE OF INSULIN (HCC): Primary | ICD-10-CM

## 2024-03-29 DIAGNOSIS — N95.0 POSTMENOPAUSAL BLEEDING: ICD-10-CM

## 2024-03-29 DIAGNOSIS — D72.829 LEUKOCYTOSIS, UNSPECIFIED TYPE: ICD-10-CM

## 2024-03-29 DIAGNOSIS — R10.2 PELVIC PAIN: ICD-10-CM

## 2024-03-29 DIAGNOSIS — M25.562 CHRONIC PAIN OF BOTH KNEES: ICD-10-CM

## 2024-03-29 DIAGNOSIS — R05.3 CHRONIC COUGH: ICD-10-CM

## 2024-03-29 DIAGNOSIS — I10 PRIMARY HYPERTENSION: ICD-10-CM

## 2024-03-29 DIAGNOSIS — Z13.220 SCREENING CHOLESTEROL LEVEL: ICD-10-CM

## 2024-03-29 DIAGNOSIS — D75.89 MACROCYTOSIS: ICD-10-CM

## 2024-03-29 DIAGNOSIS — G89.29 CHRONIC MIDLINE LOW BACK PAIN WITHOUT SCIATICA: ICD-10-CM

## 2024-03-29 LAB
ALBUMIN SERPL-MCNC: 3.7 G/DL (ref 3.5–4.6)
ALP SERPL-CCNC: 102 U/L (ref 40–130)
ALT SERPL-CCNC: 53 U/L (ref 0–33)
ANION GAP SERPL CALCULATED.3IONS-SCNC: 8 MEQ/L (ref 9–15)
AST SERPL-CCNC: 29 U/L (ref 0–35)
BASOPHILS # BLD: 0.1 K/UL (ref 0–0.2)
BASOPHILS NFR BLD: 0.6 %
BILIRUB SERPL-MCNC: 0.3 MG/DL (ref 0.2–0.7)
BUN SERPL-MCNC: 12 MG/DL (ref 6–20)
CALCIUM SERPL-MCNC: 9.6 MG/DL (ref 8.5–9.9)
CHLORIDE SERPL-SCNC: 103 MEQ/L (ref 95–107)
CHOLEST SERPL-MCNC: 173 MG/DL (ref 0–199)
CO2 SERPL-SCNC: 27 MEQ/L (ref 20–31)
CREAT SERPL-MCNC: 0.84 MG/DL (ref 0.5–0.9)
EOSINOPHIL # BLD: 0.5 K/UL (ref 0–0.7)
EOSINOPHIL NFR BLD: 3.6 %
ERYTHROCYTE [DISTWIDTH] IN BLOOD BY AUTOMATED COUNT: 13.3 % (ref 11.5–14.5)
GLOBULIN SER CALC-MCNC: 2.9 G/DL (ref 2.3–3.5)
GLUCOSE SERPL-MCNC: 186 MG/DL (ref 70–99)
HBA1C MFR BLD: 8.3 % (ref 4.8–5.9)
HCT VFR BLD AUTO: 47.8 % (ref 37–47)
HDLC SERPL-MCNC: 42 MG/DL (ref 40–59)
HGB BLD-MCNC: 15.5 G/DL (ref 12–16)
LDLC SERPL CALC-MCNC: 102 MG/DL (ref 0–129)
LYMPHOCYTES # BLD: 2.6 K/UL (ref 1–4.8)
LYMPHOCYTES NFR BLD: 19.3 %
MCH RBC QN AUTO: 32.4 PG (ref 27–31.3)
MCHC RBC AUTO-ENTMCNC: 32.4 % (ref 33–37)
MCV RBC AUTO: 100 FL (ref 79.4–94.8)
MONOCYTES # BLD: 1.1 K/UL (ref 0.2–0.8)
MONOCYTES NFR BLD: 8.3 %
NEUTROPHILS # BLD: 9.2 K/UL (ref 1.4–6.5)
NEUTS SEG NFR BLD: 67.5 %
PLATELET # BLD AUTO: 217 K/UL (ref 130–400)
POTASSIUM SERPL-SCNC: 4.4 MEQ/L (ref 3.4–4.9)
PROT SERPL-MCNC: 6.6 G/DL (ref 6.3–8)
RBC # BLD AUTO: 4.78 M/UL (ref 4.2–5.4)
SODIUM SERPL-SCNC: 138 MEQ/L (ref 135–144)
TRIGL SERPL-MCNC: 144 MG/DL (ref 0–150)
TSH SERPL-MCNC: 1.7 UIU/ML (ref 0.44–3.86)
WBC # BLD AUTO: 13.6 K/UL (ref 4.8–10.8)

## 2024-03-29 PROCEDURE — 80053 COMPREHEN METABOLIC PANEL: CPT

## 2024-03-29 PROCEDURE — 84443 ASSAY THYROID STIM HORMONE: CPT

## 2024-03-29 PROCEDURE — 76856 US EXAM PELVIC COMPLETE: CPT

## 2024-03-29 PROCEDURE — 72110 X-RAY EXAM L-2 SPINE 4/>VWS: CPT

## 2024-03-29 PROCEDURE — 83036 HEMOGLOBIN GLYCOSYLATED A1C: CPT

## 2024-03-29 PROCEDURE — 36415 COLL VENOUS BLD VENIPUNCTURE: CPT

## 2024-03-29 PROCEDURE — 73562 X-RAY EXAM OF KNEE 3: CPT

## 2024-03-29 PROCEDURE — 80061 LIPID PANEL: CPT

## 2024-03-29 PROCEDURE — 85025 COMPLETE CBC W/AUTO DIFF WBC: CPT

## 2024-03-29 PROCEDURE — 71046 X-RAY EXAM CHEST 2 VIEWS: CPT

## 2024-03-29 RX ORDER — ATORVASTATIN CALCIUM 10 MG/1
10 TABLET, FILM COATED ORAL DAILY
Qty: 90 TABLET | Refills: 1 | Status: SHIPPED | OUTPATIENT
Start: 2024-03-29

## 2024-03-29 RX ORDER — METFORMIN HYDROCHLORIDE 500 MG/1
500 TABLET, EXTENDED RELEASE ORAL
Qty: 90 TABLET | Refills: 1 | Status: SHIPPED | OUTPATIENT
Start: 2024-03-29

## 2024-03-29 NOTE — RESULT ENCOUNTER NOTE
CBC with high WBC 13.69 and high   CMP with  and ALT 53  A1c 8.3, TSH 1.7  Lipid panel with   See 03/29/2024 telephone encounter for discussion with patient.

## 2024-03-29 NOTE — RESULT ENCOUNTER NOTE
Enlarged uterus without definitive uterine mass.  Thickened endometrial stripe  See telephone encounter dated 03/29/2024 for discussion with patient

## 2024-03-29 NOTE — TELEPHONE ENCOUNTER
CBC with high WBC 13.69 and high   CMP with  and ALT 53  A1c 8.3, TSH 1.7  Lipid panel with   CXR negative  Pelvic ultrasound with thickened endometrium and enlarged uterus    Called patient and reviewed with her the followin.  Her blood sugar is elevated and consistent with diabetes that is uncontrolled.  She agrees to start metformin  mg once daily.  Initial recommendation was to start Mounjaro for further management, however, patient reports that  was recently prescribed this medication and it was not covered and too expensive to buy for him.  They are on the same insurance.  With this information we will try Ozempic instead.  Patient agrees to take this injectable medication once weekly.  She was advised to contact the office should this medication also not be covered under her plan.  If this were the case we would attempt Trulicity as the next option.    2.  Her bad cholesterol level is above threshold for treatment in the setting of known diabetes.  Patient agrees to start atorvastatin 10 mg daily for management.    3.  Her white blood cell count is elevated.  Patient denies any upper respiratory symptoms or feeling ill.  Her recent chest x-ray was negative for any acute findings.  Patient agrees to return to the lab in 2 weeks for repeat testing to reassess.  Will include B12 and folate levels secondary to elevated MCV.    4.  Her liver function testing is significantly improved and only very minimally elevated.  We will simply follow this over time at her routine office visits with repeat testing    5.  Her pelvic ultrasound shows an enlarged uterus with thickened endometrial lining.  I reviewed with patient the need to discuss further management and evaluation with gynecology.  Patient has a visit scheduled with gynecology in the next week.    Patient was advised to contact the office should there be a problem with the treatment plan as discussed above.  She was

## 2024-04-03 ENCOUNTER — PATIENT MESSAGE (OUTPATIENT)
Dept: FAMILY MEDICINE CLINIC | Age: 54
End: 2024-04-03

## 2024-04-04 ENCOUNTER — OFFICE VISIT (OUTPATIENT)
Dept: OBGYN CLINIC | Age: 54
End: 2024-04-04

## 2024-04-04 VITALS
DIASTOLIC BLOOD PRESSURE: 84 MMHG | WEIGHT: 293 LBS | HEIGHT: 59 IN | BODY MASS INDEX: 59.07 KG/M2 | SYSTOLIC BLOOD PRESSURE: 132 MMHG

## 2024-04-04 DIAGNOSIS — N95.0 PMB (POSTMENOPAUSAL BLEEDING): Primary | ICD-10-CM

## 2024-04-04 DIAGNOSIS — M17.12 TRICOMPARTMENT OSTEOARTHRITIS OF LEFT KNEE: ICD-10-CM

## 2024-04-04 DIAGNOSIS — S32.030D CLOSED WEDGE COMPRESSION FRACTURE OF L3 VERTEBRA WITH ROUTINE HEALING: Primary | ICD-10-CM

## 2024-04-04 DIAGNOSIS — M47.816 LUMBAR SPONDYLOSIS: ICD-10-CM

## 2024-04-04 PROCEDURE — 99202 OFFICE O/P NEW SF 15 MIN: CPT | Performed by: OBSTETRICS & GYNECOLOGY

## 2024-04-04 PROCEDURE — 3075F SYST BP GE 130 - 139MM HG: CPT | Performed by: OBSTETRICS & GYNECOLOGY

## 2024-04-04 PROCEDURE — 3079F DIAST BP 80-89 MM HG: CPT | Performed by: OBSTETRICS & GYNECOLOGY

## 2024-04-05 NOTE — RESULT ENCOUNTER NOTE
Age-indeterminate wedge compression fracture of L3  MyChart message sent to patient. Orthopedics referral placed.

## 2024-04-09 ENCOUNTER — TELEPHONE (OUTPATIENT)
Dept: OBGYN CLINIC | Age: 54
End: 2024-04-09

## 2024-04-09 NOTE — TELEPHONE ENCOUNTER
Called and spoke to pt. Advised pt referral was placed to . She called and scheduled with Dr. Ambrocio 5-6-24 @ 12:30 pm. Will follow up with pt after her visit with Dr. Ambrocio for surgery scheduling.

## 2024-04-12 ENCOUNTER — HOSPITAL ENCOUNTER (OUTPATIENT)
Dept: LAB | Age: 54
Discharge: HOME | End: 2024-04-12

## 2024-04-12 DIAGNOSIS — D75.89 MACROCYTOSIS: ICD-10-CM

## 2024-04-12 DIAGNOSIS — D72.829 LEUKOCYTOSIS, UNSPECIFIED TYPE: ICD-10-CM

## 2024-04-12 LAB
BASOPHILS # BLD: 0.1 K/UL (ref 0–0.2)
BASOPHILS NFR BLD: 0.6 %
EOSINOPHIL # BLD: 0.5 K/UL (ref 0–0.7)
EOSINOPHIL NFR BLD: 3.7 %
ERYTHROCYTE [DISTWIDTH] IN BLOOD BY AUTOMATED COUNT: 13.2 % (ref 11.5–14.5)
HCT VFR BLD AUTO: 50 % (ref 37–47)
HGB BLD-MCNC: 16.1 G/DL (ref 12–16)
LYMPHOCYTES # BLD: 2.5 K/UL (ref 1–4.8)
LYMPHOCYTES NFR BLD: 20.5 %
MCH RBC QN AUTO: 32.3 PG (ref 27–31.3)
MCHC RBC AUTO-ENTMCNC: 32.2 % (ref 33–37)
MCV RBC AUTO: 100.4 FL (ref 79.4–94.8)
MONOCYTES # BLD: 1 K/UL (ref 0.2–0.8)
MONOCYTES NFR BLD: 8.5 %
NEUTROPHILS # BLD: 8.1 K/UL (ref 1.4–6.5)
NEUTS SEG NFR BLD: 65.9 %
PLATELET # BLD AUTO: 214 K/UL (ref 130–400)
RBC # BLD AUTO: 4.98 M/UL (ref 4.2–5.4)
WBC # BLD AUTO: 12.3 K/UL (ref 4.8–10.8)

## 2024-04-12 PROCEDURE — 82746 ASSAY OF FOLIC ACID SERUM: CPT

## 2024-04-12 PROCEDURE — 85025 COMPLETE CBC W/AUTO DIFF WBC: CPT

## 2024-04-12 PROCEDURE — 36415 COLL VENOUS BLD VENIPUNCTURE: CPT

## 2024-04-12 PROCEDURE — 82607 VITAMIN B-12: CPT

## 2024-04-13 LAB
FOLATE: >20 NG/ML (ref 4.8–24.2)
VITAMIN B-12: 997 PG/ML (ref 232–1245)

## 2024-04-17 ENCOUNTER — PATIENT MESSAGE (OUTPATIENT)
Dept: FAMILY MEDICINE CLINIC | Age: 54
End: 2024-04-17

## 2024-04-18 NOTE — TELEPHONE ENCOUNTER
From: Lakshmi Rebolledo  To: Dr. Mathieu Silvestre  Sent: 4/17/2024 3:55 PM EDT  Subject: test results from 4/12/24    i retook the blood tests on the 12th i see the results but its means nothing to me other then everything is high and not improved much i do have a appt with you on may 3rd but im feeling a lil frightened with what i was able to look up on line about tests i know your a very busy man and will probley discuss theses tests with me on may 3rd but could you just send a lil note to maybe reassure me, till then , please

## 2024-04-27 DIAGNOSIS — I10 PRIMARY HYPERTENSION: ICD-10-CM

## 2024-04-27 NOTE — TELEPHONE ENCOUNTER
Pt is requesting medication refill. Please approve or deny this request.    Rx requested:  Requested Prescriptions     Pending Prescriptions Disp Refills    losartan (COZAAR) 25 MG tablet 90 tablet 0     Sig: Take 1 tablet by mouth daily         Last Office Visit:   3/28/2024      Next Visit Date:  Future Appointments   Date Time Provider Department Center   5/1/2024 10:00 AM Ta Guzman DO [facility]   5/2/2024  9:30 AM Bushra Logan, PT [facility]  PT [facility]   5/2/2024  4:45 PM Mathieu Silvestre MD [facility]   5/3/2024  9:15 AM Diomedes Taylor MD [facility] OR [facility]   5/6/2024  9:00 AM Philippe Flores DO [facility]   5/6/2024 12:30 PM Nick Ambrocio MD [place] [facility]   5/7/2024  8:30 AM Ileana Perry, PhD [facility] O  [facility]   5/13/2024  1:20 PM [place] MAMMO ROOM 1 [facility] RAD   5/13/2024  2:00 PM [place] [facility]

## 2024-04-28 RX ORDER — LOSARTAN POTASSIUM 25 MG/1
25 TABLET ORAL DAILY
Qty: 90 TABLET | Refills: 1 | Status: SHIPPED | OUTPATIENT
Start: 2024-04-28

## 2024-05-01 ENCOUNTER — INITIAL CONSULT (OUTPATIENT)
Age: 54
End: 2024-05-01
Payer: COMMERCIAL

## 2024-05-01 ENCOUNTER — TELEPHONE (OUTPATIENT)
Dept: PAIN MANAGEMENT | Age: 54
End: 2024-05-01

## 2024-05-01 VITALS
TEMPERATURE: 97.4 F | BODY MASS INDEX: 59.07 KG/M2 | WEIGHT: 293 LBS | HEIGHT: 59 IN | SYSTOLIC BLOOD PRESSURE: 130 MMHG | DIASTOLIC BLOOD PRESSURE: 80 MMHG

## 2024-05-01 DIAGNOSIS — M25.561 CHRONIC PAIN OF BOTH KNEES: ICD-10-CM

## 2024-05-01 DIAGNOSIS — M25.562 CHRONIC PAIN OF BOTH KNEES: ICD-10-CM

## 2024-05-01 DIAGNOSIS — M47.816 LUMBAR SPONDYLOSIS: Primary | ICD-10-CM

## 2024-05-01 DIAGNOSIS — G89.29 CHRONIC PAIN OF BOTH KNEES: ICD-10-CM

## 2024-05-01 DIAGNOSIS — S32.030D CLOSED WEDGE COMPRESSION FRACTURE OF L3 VERTEBRA WITH ROUTINE HEALING: ICD-10-CM

## 2024-05-01 PROCEDURE — 99244 OFF/OP CNSLTJ NEW/EST MOD 40: CPT | Performed by: STUDENT IN AN ORGANIZED HEALTH CARE EDUCATION/TRAINING PROGRAM

## 2024-05-01 PROCEDURE — 3075F SYST BP GE 130 - 139MM HG: CPT | Performed by: STUDENT IN AN ORGANIZED HEALTH CARE EDUCATION/TRAINING PROGRAM

## 2024-05-01 PROCEDURE — 3079F DIAST BP 80-89 MM HG: CPT | Performed by: STUDENT IN AN ORGANIZED HEALTH CARE EDUCATION/TRAINING PROGRAM

## 2024-05-01 RX ORDER — DULOXETIN HYDROCHLORIDE 30 MG/1
30 CAPSULE, DELAYED RELEASE ORAL DAILY
Qty: 30 CAPSULE | Refills: 3 | Status: SHIPPED | OUTPATIENT
Start: 2024-05-01

## 2024-05-01 RX ORDER — MELOXICAM 15 MG/1
15 TABLET ORAL DAILY
Qty: 30 TABLET | Refills: 3 | Status: SHIPPED | OUTPATIENT
Start: 2024-05-01

## 2024-05-01 RX ORDER — TIZANIDINE 2 MG/1
2 TABLET ORAL 3 TIMES DAILY PRN
Qty: 90 TABLET | Refills: 1 | Status: SHIPPED | OUTPATIENT
Start: 2024-05-01

## 2024-05-01 RX ORDER — MELOXICAM 7.5 MG/1
7.5 TABLET ORAL 2 TIMES DAILY PRN
Qty: 60 TABLET | Refills: 2 | Status: SHIPPED | OUTPATIENT
Start: 2024-05-01 | End: 2024-05-01

## 2024-05-01 ASSESSMENT — ENCOUNTER SYMPTOMS: BACK PAIN: 1

## 2024-05-01 NOTE — PROGRESS NOTES
HPI  Onset: Back- since she was 11 years old, knee- a couple years ago  Location:Lower back pain, knee pain   Quality: burning/hot and stabbing  Pain states that her pain is at a 3 at rest and a 9 with activity on the pain scale.   The pain is present: Intermittently and consistent  The pain is exacerbated by: Walking, Bending, Exercise, Standing, and Weather and alleviated by: Rest, Lying down, sitting, and Smoking .  The patient states the pain interferes with her  ADL's : Yes Transferring , Ambulating , and Sleeping  Recent falls: no  Bladder bowel dysfunction:no  She is taking the following medications for pain: Tylenol prn   Prior treatments tried for chief complaint listed above: None  Surgery: no  Physical Therapy: no.  Chiropractor: no  Acupuncture: no  Massage Therapy: no   Behavior/Wellness/Psychological support: yes  Expectations of Treatment at the Pain Center: The patient presents today for evaluation with the expectation that they will be able to perform their ADL's without excruciating pain.   Patient denies the following symptoms: Ataxia, saddle anesthesia, nausea, fever, vomiting, or recent antibiotics.

## 2024-05-01 NOTE — TELEPHONE ENCOUNTER
Prior authorization has been started on [employer], clinical uploaded, authorization pending (Key: [id number])  PA Case ID #: [id number]  Rx #: [id number]   Meloxicam was denied. How would Dr. Guzman want to proceed?  Denied. Unable to approve the medication (MELOXICAM Tablet 7.5MG) due to unmet criteria (3) as outlined in the plan guideline below. Plan guideline (CP.PMN.59 Quantity Limit Override and Dose Optimization) requires the following to be met prior to consideration of approval: Quantity Limit (QL) Exceptions (must meet all): 1. One of the following (a or b): a. Requested dose is supported by practice guidelines or peer-reviewed literature (e.g., phase 3 study or equivalent published in a reputable peer reviewed medical journal or text) for the relevant off-label use and/or regimen (prescriber must submit supporting evidence); b. Diagnosis of a rare condition/disease* for which Food and Drug Administration (FDA) dosing guidelines indicate a higher quantity (dose or frequency) than the currently set quantity limit (QL); *Example: Proton pump inhibitors, which are commonly used for gastroesophageal reflux disease, have a QL of one dose per day; however, when there is a rare diagnosis such as Zollinger-Mcneill syndrome, an override for two doses per day is allowed 2. Member has been titrated up from the lower dose with partial improvement without adverse reactions; 3. Dose optimization is required, unless one of the following applies (a or b): a. Dose titration: Multiple lower strength units are requested for the purpose of dose titration; b. Other clinical reasons: Medical justification supports inability to use the higher strength units to achieve the desired dose/regimen. Note: If you believe the member has met criteria (3) as described above, please resubmit your request with additional clinically supportive documentation for consideration.

## 2024-05-01 NOTE — PROGRESS NOTES
[facility] [place] [facility], [facility] PAIN MANAGEMENT  [address] [address]  Dept: [phone]  Dept Fax: [phone]  Loc: [phone]     5/1/2024    Visit type: New patient    Reason for Visit: Back Pain (Lower back pain ) and Knee Pain (Bilateral )       ASSESSMENT/PLAN   1. Lumbar spondylosis  Assessment & Plan:     Chronic illness with a severe exacerbation and/or progression which affects ADL's. Pain has been present for multiple years and is expected to last at least a year. Patient is unable to sleep, transfer, nor ambulate. Goals of care include pain relief >50% and ability to perform ADLs with less pain.    54-year-old female with history of chronic back pain multiple years.  Pain is extremely debilitating affects ADLs.  Pain score 6 or greater with activity.  Pain is mostly located in the lower lumbar region without radiating symptoms.    Patient displays chronic low axial back pain with no radiation.  Extension and rotation of the lumbar spine elicits pain. Pain is located bilateral .  Patient displays tenderness to palpation in the bilateral  lumbar paraspinal musculature.  Positive facet loading bilateral .     Lumbar spine x-ray:  FINDINGS:  6 views lumbar spine are submitted.     There are 5 lumbar type vertebra.     The disc spaces are intact.     No significant listhesis.     No spondylolysis.     There is mild degenerative changes posterior facets lower lumbar spine.  There is anterior spondylosis..     The SI joints are intact.     Age indeterminate compression fractures out, wedge deformity of the L3  vertebra.     IMPRESSION:  Multilevel osteoarthritis of the lumbar spine     Age indeterminate compression fractures/,wedge deformity of the L3 vertebra.      -Will consider bilateral lumbar medial branch blocks in the future if indicated  - Patient already referred to physical therapy.  She will start this week.  - Duloxetine 30 mg

## 2024-05-01 NOTE — ASSESSMENT & PLAN NOTE
Chronic illness with a severe exacerbation and/or progression which affects ADL's. Pain has been present for multiple years and is expected to last at least a year. Patient is unable to sleep, transfer, nor ambulate. Goals of care include pain relief >50% and ability to perform ADLs with less pain.    54-year-old female with history of chronic back pain multiple years.  Pain is extremely debilitating affects ADLs.  Pain score 6 or greater with activity.  Pain is mostly located in the lower lumbar region without radiating symptoms.    Patient displays chronic low axial back pain with no radiation.  Extension and rotation of the lumbar spine elicits pain. Pain is located bilateral .  Patient displays tenderness to palpation in the bilateral  lumbar paraspinal musculature.  Positive facet loading bilateral .     Lumbar spine x-ray:  FINDINGS:  6 views lumbar spine are submitted.     There are 5 lumbar type vertebra.     The disc spaces are intact.     No significant listhesis.     No spondylolysis.     There is mild degenerative changes posterior facets lower lumbar spine.  There is anterior spondylosis..     The SI joints are intact.     Age indeterminate compression fractures out, wedge deformity of the L3  vertebra.     IMPRESSION:  Multilevel osteoarthritis of the lumbar spine     Age indeterminate compression fractures/,wedge deformity of the L3 vertebra.      -Will consider bilateral lumbar medial branch blocks in the future if indicated  - Patient already referred to physical therapy.  She will start this week.  - Duloxetine 30 mg daily  - Meloxicam 7.5 mg twice daily as needed  - Tizanidine 2 mg 3 times daily as needed

## 2024-05-01 NOTE — ASSESSMENT & PLAN NOTE
Patient has history of L3 compression fracture.  She also has a severe osteoporosis.  She continues to have severe back pain that limits mobility and functionality.  ADLs are severely affected.  Pain score greater than 6 with activity.    Patient displays midline tenderness in the lumbar spine.    Will order lumbar MRI for evaluation of lumbar spine and potentially any other compression fractures.

## 2024-05-01 NOTE — ASSESSMENT & PLAN NOTE
Chronic illness with a severe exacerbation and/or progression which affects ADL's. Pain has been present for multiple years and is expected to last at least a year. Patient is unable to sleep, transfer, nor ambulate. Goals of care include pain relief >50% and ability to perform ADLs with less pain.    Pain is severely debilitating affects ADLs.  Pain score 6 or greater with activity.  Upon assessment, patient displays bilateral chronic knee pain.  Her left knee is more bothersome than the right.  On physical exam, both knees displayed tenderness to the superior medial, superior lateral, and inferior medial aspects.    Left Knee X-Ray:  FINDINGS:  Four views of the left knee are submitted.     There is mild narrowing of the medial joint space compartment.     No significant effusion.     There is mild hypertrophic spurring noted.  No focal bony abnormality no  dislocation.  Line no acute fracture.     IMPRESSION:  Mild osteoarthritis of the left knee.  No acute fracture    Right Knee X-Ray:  FINDINGS:  Four views of the left knee were obtained.  There is no acute fracture  dislocation left knee.  There are mild tricompartmental degenerative changes  of the left knee more prominent within the medial compartment.  There is no  osseous lesion.  There is no joint effusion.     IMPRESSION:  1. There is no fracture dislocation of the left knee  2. Mild tricompartmental degenerative changes of the left knee more prominent  within the medial compartment.    -Will plan for left genicular nerve block under fluoroscopic guidance with 0.25% bupivacaine and dexamethasone.  Will consider the right knee in the future if indicated.  - Duloxetine 30 mg daily  - Meloxicam 7.5 mg twice daily as needed  - Tizanidine 2 mg 3 times daily as needed

## 2024-05-02 ENCOUNTER — OFFICE VISIT (OUTPATIENT)
Dept: FAMILY MEDICINE CLINIC | Age: 54
End: 2024-05-02
Payer: COMMERCIAL

## 2024-05-02 ENCOUNTER — HOSPITAL ENCOUNTER (OUTPATIENT)
Dept: PHYSICAL THERAPY | Age: 54
Setting detail: THERAPIES SERIES
Discharge: HOME | End: 2024-05-02
Payer: COMMERCIAL

## 2024-05-02 VITALS
SYSTOLIC BLOOD PRESSURE: 130 MMHG | WEIGHT: 293 LBS | DIASTOLIC BLOOD PRESSURE: 80 MMHG | TEMPERATURE: 97.8 F | HEART RATE: 94 BPM | BODY MASS INDEX: 59.07 KG/M2 | OXYGEN SATURATION: 97 % | HEIGHT: 59 IN

## 2024-05-02 DIAGNOSIS — G89.29 CHRONIC PAIN OF BOTH KNEES: ICD-10-CM

## 2024-05-02 DIAGNOSIS — D72.829 LEUKOCYTOSIS, UNSPECIFIED TYPE: ICD-10-CM

## 2024-05-02 DIAGNOSIS — R79.89 ABNORMAL LFTS (LIVER FUNCTION TESTS): ICD-10-CM

## 2024-05-02 DIAGNOSIS — I10 PRIMARY HYPERTENSION: ICD-10-CM

## 2024-05-02 DIAGNOSIS — M25.561 CHRONIC PAIN OF BOTH KNEES: ICD-10-CM

## 2024-05-02 DIAGNOSIS — N95.0 POSTMENOPAUSAL BLEEDING: ICD-10-CM

## 2024-05-02 DIAGNOSIS — M25.562 CHRONIC PAIN OF BOTH KNEES: ICD-10-CM

## 2024-05-02 DIAGNOSIS — E66.01 MORBID OBESITY WITH BMI OF 60.0-69.9, ADULT (HCC): ICD-10-CM

## 2024-05-02 DIAGNOSIS — G47.10 HYPERSOMNIA: ICD-10-CM

## 2024-05-02 DIAGNOSIS — S32.030D CLOSED WEDGE COMPRESSION FRACTURE OF L3 VERTEBRA WITH ROUTINE HEALING: ICD-10-CM

## 2024-05-02 DIAGNOSIS — E11.65 TYPE 2 DIABETES MELLITUS WITH HYPERGLYCEMIA, WITHOUT LONG-TERM CURRENT USE OF INSULIN (HCC): Primary | ICD-10-CM

## 2024-05-02 DIAGNOSIS — R06.09 DYSPNEA ON EXERTION: ICD-10-CM

## 2024-05-02 DIAGNOSIS — M54.50 CHRONIC MIDLINE LOW BACK PAIN WITHOUT SCIATICA: ICD-10-CM

## 2024-05-02 DIAGNOSIS — F32.A DEPRESSION, UNSPECIFIED DEPRESSION TYPE: ICD-10-CM

## 2024-05-02 DIAGNOSIS — G89.29 CHRONIC MIDLINE LOW BACK PAIN WITHOUT SCIATICA: ICD-10-CM

## 2024-05-02 PROCEDURE — 97110 THERAPEUTIC EXERCISES: CPT

## 2024-05-02 PROCEDURE — 3052F HG A1C>EQUAL 8.0%<EQUAL 9.0%: CPT | Performed by: FAMILY MEDICINE

## 2024-05-02 PROCEDURE — 3079F DIAST BP 80-89 MM HG: CPT | Performed by: FAMILY MEDICINE

## 2024-05-02 PROCEDURE — 99214 OFFICE O/P EST MOD 30 MIN: CPT | Performed by: FAMILY MEDICINE

## 2024-05-02 PROCEDURE — 3075F SYST BP GE 130 - 139MM HG: CPT | Performed by: FAMILY MEDICINE

## 2024-05-02 PROCEDURE — 97163 PT EVAL HIGH COMPLEX 45 MIN: CPT

## 2024-05-02 PROCEDURE — 97530 THERAPEUTIC ACTIVITIES: CPT

## 2024-05-02 RX ORDER — BLOOD PRESSURE TEST KIT
KIT MISCELLANEOUS
Qty: 100 EACH | Refills: 5 | Status: SHIPPED | OUTPATIENT
Start: 2024-05-02

## 2024-05-02 RX ORDER — LANCETS 30 GAUGE
EACH MISCELLANEOUS
Qty: 100 EACH | Refills: 5 | Status: SHIPPED | OUTPATIENT
Start: 2024-05-02

## 2024-05-02 RX ORDER — GLUCOSAMINE HCL/CHONDROITIN SU 500-400 MG
CAPSULE ORAL
Qty: 100 STRIP | Refills: 5 | Status: SHIPPED | OUTPATIENT
Start: 2024-05-02

## 2024-05-02 ASSESSMENT — ENCOUNTER SYMPTOMS
BLOOD IN STOOL: 0
CONSTIPATION: 0
ANAL BLEEDING: 0
WHEEZING: 1
NAUSEA: 0
VOMITING: 0
DIARRHEA: 0
CHEST TIGHTNESS: 0
SHORTNESS OF BREATH: 1
BACK PAIN: 1
ABDOMINAL PAIN: 0
COUGH: 0

## 2024-05-02 ASSESSMENT — PAIN DESCRIPTION - LOCATION: LOCATION: BACK;KNEE

## 2024-05-02 ASSESSMENT — PAIN DESCRIPTION - DESCRIPTORS: DESCRIPTORS: ACHING;BURNING

## 2024-05-02 ASSESSMENT — PAIN DESCRIPTION - PAIN TYPE: TYPE: CHRONIC PAIN

## 2024-05-02 ASSESSMENT — PAIN SCALES - GENERAL: PAINLEVEL_OUTOF10: 7

## 2024-05-02 ASSESSMENT — PAIN DESCRIPTION - ORIENTATION: ORIENTATION: RIGHT;LEFT

## 2024-05-02 NOTE — PROGRESS NOTES
Lakshmi Rebolledo (: 1970) is a 54 y.o. female, Established patient, who presents today for:    Chief Complaint   Patient presents with    Follow-up     Pt has been checking her sugar at home, was at 400+ but is now lower than 250  Pt is seeing and scheduling all appts that have been talked about, was told she has extra water weight on her legs         Assessment & Plan     1. Type 2 diabetes mellitus with hyperglycemia, without long-term current use of insulin ([facility])  Assessment & Plan:  Improved blood glucose values reported at home with initiation of metformin.  I stressed with patient importance of starting Ozempic for further management.  Patient has been referred to diabetic education for help with further lifestyle management.  It was reviewed with patient that blood sugar should be checked once a day, alternating between before breakfast, before lunch, and before dinner. These values should be recorded in a logbook that is brought to each visit.  Orders:  -     [facility], [place]  -     Alcohol Swabs PADS; Disp-100 each, R-5, NormalDX: diabetes mellitus. Test 1 time(s) daily - Ok to substitute per insurance (1 each = 1 box)  -     blood glucose monitor kit and supplies; DX: diabetes mellitus. Test 1 time(s) daily - Ok to substitute per insurance, Disp-1 kit, R-0, Normal  -     blood glucose monitor strips; DX: diabetes mellitus. Use 1 time(s) daily - Ok to substitute per insurance, Disp-100 strip, R-5, Normal  -     Lancets MISC; Disp-100 each, R-5, NormalDX: diabetes mellitus. Use 1 time(s) daily - Ok to substitute per insurance (1 each = 1 box)  -     Comprehensive Metabolic Panel; Future  -     Lipid Panel; Future  -     Hemoglobin A1C; Future  -     Microalbumin / Creatinine Urine Ratio; Future  2. Morbid obesity with BMI of 60.0-69.9, adult ([facility])  Assessment & Plan:  Patient counseled on healthy dietary choices and the benefits of a lower salt and/or lower carbohydrate diet as

## 2024-05-02 NOTE — ASSESSMENT & PLAN NOTE
Patient counseled on healthy dietary choices and the benefits of a lower salt and/or lower carbohydrate diet as appropriate. Patient also counseled on benefits of moderate intensity cardiovascular exercise for 150 minutes per week as they are able. Advice was given to make small changes over time, setting smaller achievable goals until recommended lifestyle changes are reached.  I reviewed with patient that Ozempic prescribed for her type 2 diabetes with hyperglycemia would also provide benefits with healthy weight loss.

## 2024-05-02 NOTE — ASSESSMENT & PLAN NOTE
Stable mood with no reported SI/HI or self-harming behaviors.  Patient has been started on duloxetine by pain management office.  A visit is scheduled with psychology for counseling/therapy.  We will continue to follow over time.

## 2024-05-02 NOTE — PROGRESS NOTES
Patient ID:  Lakshmi Rebolledo is a 54 y.o. female who presents today for evaluation of left knee pain.  X-rays are as follows:    FINDINGS:  Four views of the left knee are submitted.     There is mild narrowing of the medial joint space compartment.     No significant effusion.     There is mild hypertrophic spurring noted.  No focal bony abnormality no  dislocation.  Line no acute fracture.     IMPRESSION:  Mild osteoarthritis of the left knee.     No acute fracture    Injury: no  Metal Allergy: no    Location: left knee pain, located on the global aspect of the knee  Pain: yes; 7 on a scale of 1 to 10  Onset: gradual  Duration: 2 years  Frequency:  occurs daily  Quality: aching and boring   Swelling: patient notes intermittent swelling of the joint  Aggravating factors: weight bearing activity, standing, and walking  Alleviating factors: removing weight from leg and rest  Mechanical symptoms: none  Radiation: no    Activities: walking independently  Restriction:  decreased ambulatory tolerance  Progression:  worsening    Previous treatment:  none  NSAIDs:  none  PT:  none    Medications:    Current Outpatient Medications on File Prior to Visit   Medication Sig Dispense Refill    Alcohol Swabs PADS DX: diabetes mellitus. Test 1 time(s) daily - Ok to substitute per insurance (1 each = 1 box) 100 each 5    blood glucose monitor kit and supplies DX: diabetes mellitus. Test 1 time(s) daily - Ok to substitute per insurance 1 kit 0    blood glucose monitor strips DX: diabetes mellitus. Use 1 time(s) daily - Ok to substitute per insurance 100 strip 5    Lancets MISC DX: diabetes mellitus. Use 1 time(s) daily - Ok to substitute per insurance (1 each = 1 box) 100 each 5    DULoxetine (CYMBALTA) 30 MG extended release capsule Take 1 capsule by mouth daily 30 capsule 3    tiZANidine (ZANAFLEX) 2 MG tablet Take 1 tablet by mouth 3 times daily as needed (spasms) 90 tablet 1    meloxicam (MOBIC) 15 MG tablet Take 1 tablet by mouth

## 2024-05-02 NOTE — ASSESSMENT & PLAN NOTE
Care established with GYN for further management.  Hysteroscopy with D&C is planned once patient obtains cardiac clearance. Most recent blood counts stable.  We will continue to follow lab work over time.

## 2024-05-02 NOTE — ASSESSMENT & PLAN NOTE
Improved blood glucose values reported at home with initiation of metformin.  I stressed with patient importance of starting Ozempic for further management.  Patient has been referred to diabetic education for help with further lifestyle management.  It was reviewed with patient that blood sugar should be checked once a day, alternating between before breakfast, before lunch, and before dinner. These values should be recorded in a logbook that is brought to each visit.

## 2024-05-02 NOTE — ASSESSMENT & PLAN NOTE
Patient established with pain management and has upcoming visit with spinal orthopedics for further evaluation and potential further treatment.  Imaging has been ordered by pain management office. Patient instructed to keep specialist visits and follow recommendations from these offices.

## 2024-05-02 NOTE — ASSESSMENT & PLAN NOTE
Patient instructed to complete full course of formal physical therapy.  Orthopedics visit scheduled in the near future for further evaluation and management.

## 2024-05-02 NOTE — ASSESSMENT & PLAN NOTE
Blood pressure within normal limits today in the office.  Patient instructed to continue with current dose of losartan.

## 2024-05-02 NOTE — PROGRESS NOTES
Physical Therapy: Initial Evaluation    Patient: Lakshmi Rebolledo (54 y.o. female)   Examination Date: 2024  Plan of Care Certification Period: 2024 to 24      :  1970 ;    Confirmed: Yes MRN: [id number]  CSN: [id number]   Insurance: Payor: [employer] / Plan: [employer] [place] / Product Type: *No Product type* /   Insurance ID: [id number] - (Commercial) Secondary Insurance (if applicable):    Referring Physician: Mathieu Silvestre MD Dr. Cajigas   PCP: Mathieu Silvestre MD Visits to Date/Visits Approved: 1 /      No Show/Cancelled Appts: 0 / 0     Medical Diagnosis: Chronic midline low back pain without sciatica [M54.50, G89.29]  Chronic pain of both knees [M25.561, M25.562, G89.29] chronic midlne LBP wihtout sciatica chronic pain bilat knees  Treatment Diagnosis: chronic back pain, chronic bilat knee pain     PERTINENT MEDICAL HISTORY           Medical History: Chart Reviewed: Yes   Past Medical History:   Diagnosis Date    Abnormal Pap smear of cervix     Cerebral artery occlusion with cerebral infarction ([facility])     Chlamydia     Closed wedge compression fracture of L3 vertebra with routine healing 2024    COPD (chronic obstructive pulmonary disease) ([facility])     COPD (chronic obstructive pulmonary disease) ([facility]) 2022    Drug effect     plastic  tape    Heart disease     History of abnormal cervical Pap smear 2024    Hypertension 1990 cont    Lumbar spondylosis 2024    Menopausal symptoms finished     MI (myocardial infarction) ([facility])     Migraine     Morbid obesity with BMI of 60.0-69.9, adult ([facility]) 2024    MS (multiple sclerosis) ([facility])     Obesity 2024    Pre-diabetes 2024     labor     Rh incompatibility     TB (tuberculosis)     Thrombocytopenia ([facility]) 2024    Tricompartment osteoarthritis of left knee 2024    Type 2 diabetes mellitus without complication ([facility])      Surgical History:

## 2024-05-02 NOTE — ASSESSMENT & PLAN NOTE
Likely related to body habitus and deconditioning as well as suspected obstructive lung disease based on significant smoking history. PFTs scheduled. We will make further recommendations based on results.

## 2024-05-03 ENCOUNTER — OFFICE VISIT (OUTPATIENT)
Dept: ORTHOPEDIC SURGERY | Age: 54
End: 2024-05-03
Payer: COMMERCIAL

## 2024-05-03 VITALS
HEART RATE: 105 BPM | TEMPERATURE: 98.2 F | WEIGHT: 293 LBS | BODY MASS INDEX: 59.07 KG/M2 | OXYGEN SATURATION: 95 % | HEIGHT: 59 IN

## 2024-05-03 DIAGNOSIS — M17.12 PRIMARY OSTEOARTHRITIS OF LEFT KNEE: Primary | ICD-10-CM

## 2024-05-03 PROCEDURE — 99204 OFFICE O/P NEW MOD 45 MIN: CPT | Performed by: ORTHOPAEDIC SURGERY

## 2024-05-03 ASSESSMENT — ENCOUNTER SYMPTOMS
EYE PAIN: 0
DIARRHEA: 0
COUGH: 0
EYE DISCHARGE: 0
EYE ITCHING: 0
ABDOMINAL PAIN: 0
CONSTIPATION: 0
SHORTNESS OF BREATH: 0

## 2024-05-04 SDOH — HEALTH STABILITY: PHYSICAL HEALTH: ON AVERAGE, HOW MANY MINUTES DO YOU ENGAGE IN EXERCISE AT THIS LEVEL?: 0 MIN

## 2024-05-04 SDOH — HEALTH STABILITY: PHYSICAL HEALTH: ON AVERAGE, HOW MANY DAYS PER WEEK DO YOU ENGAGE IN MODERATE TO STRENUOUS EXERCISE (LIKE A BRISK WALK)?: 0 DAYS

## 2024-05-04 ASSESSMENT — COLUMBIA-SUICIDE SEVERITY RATING SCALE - C-SSRS
6. IN YOUR LIFETIME, HAVE YOU EVER DONE ANYTHING, STARTED TO DO ANYTHING, OR PREPARED TO DO ANYTHING TO END YOUR LIFE?: NO
2. IN THE PAST MONTH, HAVE YOU ACTUALLY HAD ANY THOUGHTS OF KILLING YOURSELF?: NO
1. IN THE PAST MONTH, HAVE YOU WISHED YOU WERE DEAD OR WISHED YOU COULD GO TO SLEEP AND NOT WAKE UP?: YES

## 2024-05-04 ASSESSMENT — ANXIETY QUESTIONNAIRES
IF YOU CHECKED OFF ANY PROBLEMS ON THIS QUESTIONNAIRE, HOW DIFFICULT HAVE THESE PROBLEMS MADE IT FOR YOU TO DO YOUR WORK, TAKE CARE OF THINGS AT HOME, OR GET ALONG WITH OTHER PEOPLE: VERY DIFFICULT
2. NOT BEING ABLE TO STOP OR CONTROL WORRYING: NEARLY EVERY DAY
IF YOU CHECKED OFF ANY PROBLEMS ON THIS QUESTIONNAIRE, HOW DIFFICULT HAVE THESE PROBLEMS MADE IT FOR YOU TO DO YOUR WORK, TAKE CARE OF THINGS AT HOME, OR GET ALONG WITH OTHER PEOPLE: VERY DIFFICULT
1. FEELING NERVOUS, ANXIOUS, OR ON EDGE: MORE THAN HALF THE DAYS
GAD7 TOTAL SCORE: 18
4. TROUBLE RELAXING: NEARLY EVERY DAY
5. BEING SO RESTLESS THAT IT IS HARD TO SIT STILL: SEVERAL DAYS
7. FEELING AFRAID AS IF SOMETHING AWFUL MIGHT HAPPEN: NEARLY EVERY DAY
2. NOT BEING ABLE TO STOP OR CONTROL WORRYING: NEARLY EVERY DAY
6. BECOMING EASILY ANNOYED OR IRRITABLE: NEARLY EVERY DAY
6. BECOMING EASILY ANNOYED OR IRRITABLE: NEARLY EVERY DAY
3. WORRYING TOO MUCH ABOUT DIFFERENT THINGS: NEARLY EVERY DAY
5. BEING SO RESTLESS THAT IT IS HARD TO SIT STILL: SEVERAL DAYS
4. TROUBLE RELAXING: NEARLY EVERY DAY
1. FEELING NERVOUS, ANXIOUS, OR ON EDGE: MORE THAN HALF THE DAYS
7. FEELING AFRAID AS IF SOMETHING AWFUL MIGHT HAPPEN: NEARLY EVERY DAY
3. WORRYING TOO MUCH ABOUT DIFFERENT THINGS: NEARLY EVERY DAY

## 2024-05-04 ASSESSMENT — PATIENT HEALTH QUESTIONNAIRE - PHQ9
SUM OF ALL RESPONSES TO PHQ9 QUESTIONS 1 & 2: 5
4. FEELING TIRED OR HAVING LITTLE ENERGY: NEARLY EVERY DAY
SUM OF ALL RESPONSES TO PHQ QUESTIONS 1-9: 20
8. MOVING OR SPEAKING SO SLOWLY THAT OTHER PEOPLE COULD HAVE NOTICED. OR THE OPPOSITE, BEING SO FIGETY OR RESTLESS THAT YOU HAVE BEEN MOVING AROUND A LOT MORE THAN USUAL: MORE THAN HALF THE DAYS
1. LITTLE INTEREST OR PLEASURE IN DOING THINGS: MORE THAN HALF THE DAYS
9. THOUGHTS THAT YOU WOULD BE BETTER OFF DEAD, OR OF HURTING YOURSELF: SEVERAL DAYS
2. FEELING DOWN, DEPRESSED OR HOPELESS: NEARLY EVERY DAY
7. TROUBLE CONCENTRATING ON THINGS, SUCH AS READING THE NEWSPAPER OR WATCHING TELEVISION: SEVERAL DAYS
SUM OF ALL RESPONSES TO PHQ QUESTIONS 1-9: 21
5. POOR APPETITE OR OVEREATING: NEARLY EVERY DAY
3. TROUBLE FALLING OR STAYING ASLEEP: NEARLY EVERY DAY
10. IF YOU CHECKED OFF ANY PROBLEMS, HOW DIFFICULT HAVE THESE PROBLEMS MADE IT FOR YOU TO DO YOUR WORK, TAKE CARE OF THINGS AT HOME, OR GET ALONG WITH OTHER PEOPLE: VERY DIFFICULT
SUM OF ALL RESPONSES TO PHQ QUESTIONS 1-9: 21
5. POOR APPETITE OR OVEREATING: NEARLY EVERY DAY
SUM OF ALL RESPONSES TO PHQ QUESTIONS 1-9: 21
9. THOUGHTS THAT YOU WOULD BE BETTER OFF DEAD, OR OF HURTING YOURSELF: SEVERAL DAYS
6. FEELING BAD ABOUT YOURSELF - OR THAT YOU ARE A FAILURE OR HAVE LET YOURSELF OR YOUR FAMILY DOWN: NEARLY EVERY DAY
6. FEELING BAD ABOUT YOURSELF - OR THAT YOU ARE A FAILURE OR HAVE LET YOURSELF OR YOUR FAMILY DOWN: NEARLY EVERY DAY
1. LITTLE INTEREST OR PLEASURE IN DOING THINGS: MORE THAN HALF THE DAYS
3. TROUBLE FALLING OR STAYING ASLEEP: NEARLY EVERY DAY
SUM OF ALL RESPONSES TO PHQ QUESTIONS 1-9: 21
10. IF YOU CHECKED OFF ANY PROBLEMS, HOW DIFFICULT HAVE THESE PROBLEMS MADE IT FOR YOU TO DO YOUR WORK, TAKE CARE OF THINGS AT HOME, OR GET ALONG WITH OTHER PEOPLE: VERY DIFFICULT
2. FEELING DOWN, DEPRESSED OR HOPELESS: NEARLY EVERY DAY
7. TROUBLE CONCENTRATING ON THINGS, SUCH AS READING THE NEWSPAPER OR WATCHING TELEVISION: SEVERAL DAYS
8. MOVING OR SPEAKING SO SLOWLY THAT OTHER PEOPLE COULD HAVE NOTICED. OR THE OPPOSITE - BEING SO FIDGETY OR RESTLESS THAT YOU HAVE BEEN MOVING AROUND A LOT MORE THAN USUAL: MORE THAN HALF THE DAYS
4. FEELING TIRED OR HAVING LITTLE ENERGY: NEARLY EVERY DAY

## 2024-05-04 ASSESSMENT — LIFESTYLE VARIABLES
ALCOHOL_DAYS_PER_WEEK: 1
PAST THREE MONTHS WHAT IS THE LARGEST AMOUNT OF ALCOHOLIC DRINKS YOU HAVE CONSUMED IN ONE DAY: 2
HISTORY_ALCOHOL_USE: NO
HAVE YOU EVER RECEIVED ALCOHOL OR OTHER DRUG ABUSE TREATMENT: NO

## 2024-05-06 ENCOUNTER — OFFICE VISIT (OUTPATIENT)
Dept: CARDIOLOGY CLINIC | Age: 54
End: 2024-05-06
Payer: COMMERCIAL

## 2024-05-06 ENCOUNTER — OFFICE VISIT (OUTPATIENT)
Dept: ORTHOPEDIC SURGERY | Age: 54
End: 2024-05-06
Payer: COMMERCIAL

## 2024-05-06 ENCOUNTER — HOSPITAL ENCOUNTER (OUTPATIENT)
Dept: ORTHOPEDIC SURGERY | Age: 54
Discharge: HOME | End: 2024-05-08
Payer: COMMERCIAL

## 2024-05-06 VITALS
TEMPERATURE: 97.3 F | HEART RATE: 57 BPM | OXYGEN SATURATION: 95 % | BODY MASS INDEX: 59.07 KG/M2 | WEIGHT: 293 LBS | HEIGHT: 59 IN

## 2024-05-06 VITALS
BODY MASS INDEX: 60.19 KG/M2 | SYSTOLIC BLOOD PRESSURE: 122 MMHG | WEIGHT: 293 LBS | DIASTOLIC BLOOD PRESSURE: 84 MMHG | HEART RATE: 89 BPM

## 2024-05-06 DIAGNOSIS — R52 PAIN: ICD-10-CM

## 2024-05-06 DIAGNOSIS — Z01.818 PRE-OPERATIVE CLEARANCE: Primary | ICD-10-CM

## 2024-05-06 DIAGNOSIS — I10 HYPERTENSION, UNSPECIFIED TYPE: ICD-10-CM

## 2024-05-06 DIAGNOSIS — R06.09 DOE (DYSPNEA ON EXERTION): ICD-10-CM

## 2024-05-06 DIAGNOSIS — E78.5 DYSLIPIDEMIA: ICD-10-CM

## 2024-05-06 DIAGNOSIS — R07.9 CHEST PAIN, UNSPECIFIED TYPE: ICD-10-CM

## 2024-05-06 DIAGNOSIS — E11.65 TYPE 2 DIABETES MELLITUS WITH HYPERGLYCEMIA, WITHOUT LONG-TERM CURRENT USE OF INSULIN (HCC): ICD-10-CM

## 2024-05-06 DIAGNOSIS — R52 PAIN: Primary | ICD-10-CM

## 2024-05-06 DIAGNOSIS — S32.030D COMPRESSION FRACTURE OF L3 VERTEBRA WITH ROUTINE HEALING, SUBSEQUENT ENCOUNTER: ICD-10-CM

## 2024-05-06 PROCEDURE — 3079F DIAST BP 80-89 MM HG: CPT | Performed by: INTERNAL MEDICINE

## 2024-05-06 PROCEDURE — 3052F HG A1C>EQUAL 8.0%<EQUAL 9.0%: CPT | Performed by: INTERNAL MEDICINE

## 2024-05-06 PROCEDURE — 72110 X-RAY EXAM L-2 SPINE 4/>VWS: CPT | Performed by: ORTHOPAEDIC SURGERY

## 2024-05-06 PROCEDURE — 72110 X-RAY EXAM L-2 SPINE 4/>VWS: CPT

## 2024-05-06 PROCEDURE — 99204 OFFICE O/P NEW MOD 45 MIN: CPT | Performed by: INTERNAL MEDICINE

## 2024-05-06 PROCEDURE — 99203 OFFICE O/P NEW LOW 30 MIN: CPT | Performed by: ORTHOPAEDIC SURGERY

## 2024-05-06 PROCEDURE — 93000 ELECTROCARDIOGRAM COMPLETE: CPT | Performed by: INTERNAL MEDICINE

## 2024-05-06 PROCEDURE — 3074F SYST BP LT 130 MM HG: CPT | Performed by: INTERNAL MEDICINE

## 2024-05-06 ASSESSMENT — ENCOUNTER SYMPTOMS
NAUSEA: 0
SHORTNESS OF BREATH: 1
COUGH: 0
GASTROINTESTINAL NEGATIVE: 1
EYES NEGATIVE: 1
CHEST TIGHTNESS: 1
STRIDOR: 0
WHEEZING: 0
BLOOD IN STOOL: 0

## 2024-05-06 NOTE — PROGRESS NOTES
NEW PATIENT        Patient: Lakshmi Rebolledo  YOB: 1970  MRN: [id number]    Chief Complaint:  Chief Complaint   Patient presents with    Establish Cardiologist    Pre-op Exam       CV Data:    Subjective/HPI referred for Preop Hysterectomy.  Significant and severe BHATT. She also had Chest heaviness w exertion.  Also occ LE Edema. No falls no bleed     EKG: SR 89    Smoker  ++ FH  Lives w   No work     Past Medical History:   Diagnosis Date    Abnormal Pap smear of cervix     Cerebral artery occlusion with cerebral infarction ([facility])     CHF (congestive heart failure) ([facility]) ?    Chlamydia     Closed wedge compression fracture of L3 vertebra with routine healing 2024    COPD (chronic obstructive pulmonary disease) ([facility])     COPD (chronic obstructive pulmonary disease) ([facility]) 2022    Depression     Drug effect     plastic  tape    Heart disease     History of abnormal cervical Pap smear 2024    Hypertension 1990 cont    Lumbar spondylosis 2024    Menopausal symptoms finished     MI (myocardial infarction) ([facility])     Migraine     Morbid obesity with BMI of 60.0-69.9, adult ([facility]) 2024    MS (multiple sclerosis) ([facility])     Obesity 2024    Pre-diabetes 2024     labor 1992    Restless legs syndrome 20 plus years never dianosed    Rh incompatibility     Sleep apnea possible    TB (tuberculosis)     Thrombocytopenia ([facility]) 2024    Tricompartment osteoarthritis of left knee 2024    Type 2 diabetes mellitus without complication ([facility])        Past Surgical History:   Procedure Laterality Date     SECTION  1992     SECTION  1990    LEEP  1990    ORTHOPEDIC SURGERY Left     tendon injury to thumb    TONSILLECTOMY AND ADENOIDECTOMY Bilateral 1982    TUBAL LIGATION  1992       Family History   Problem Relation Age of Onset    Asthma Mother     High Blood Pressure Mother     High Cholesterol

## 2024-05-06 NOTE — PROGRESS NOTES
--------------------------------------------------------------------------------------------------------------    Tobacco Use      Smoking status: Every Day        Packs/day: 0.50        Years: 2.4 packs/day for 41.2 years (100.6 ttl pk-yrs)        Types: Cigarettes        Start date: 2/21/1982        Last attempt to quit: 2/21/2021      Smokeless tobacco: Never      Tobacco comments: stoped for about 5 yrs  but reastarted     reports that she does not currently use drugs after having used the following drugs: Marijuana (Weed).  --------------------------------------------------------------------------------------------------------------  Allergies   Allergen Reactions    Tape [Adhesive Tape] Other (See Comments)     Skin blisters     --------------------------------------------------------------------------------------------------------------    Current Outpatient Medications:     Alcohol Swabs PADS, DX: diabetes mellitus. Test 1 time(s) daily - Ok to substitute per insurance (1 each = 1 box), Disp: 100 each, Rfl: 5    blood glucose monitor kit and supplies, DX: diabetes mellitus. Test 1 time(s) daily - Ok to substitute per insurance, Disp: 1 kit, Rfl: 0    blood glucose monitor strips, DX: diabetes mellitus. Use 1 time(s) daily - Ok to substitute per insurance, Disp: 100 strip, Rfl: 5    Lancets MISC, DX: diabetes mellitus. Use 1 time(s) daily - Ok to substitute per insurance (1 each = 1 box), Disp: 100 each, Rfl: 5    DULoxetine (CYMBALTA) 30 MG extended release capsule, Take 1 capsule by mouth daily, Disp: 30 capsule, Rfl: 3    tiZANidine (ZANAFLEX) 2 MG tablet, Take 1 tablet by mouth 3 times daily as needed (spasms), Disp: 90 tablet, Rfl: 1    meloxicam (MOBIC) 15 MG tablet, Take 1 tablet by mouth daily, Disp: 30 tablet, Rfl: 3    losartan (COZAAR) 25 MG tablet, Take 1 tablet by mouth daily, Disp: 90 tablet, Rfl: 1    metFORMIN (GLUCOPHAGE-XR) 500 MG extended release tablet, Take 1 tablet by mouth daily

## 2024-05-07 ENCOUNTER — HOSPITAL ENCOUNTER (OUTPATIENT)
Dept: PHYSICAL THERAPY | Age: 54
Setting detail: THERAPIES SERIES
Discharge: HOME | End: 2024-05-07
Payer: COMMERCIAL

## 2024-05-07 ENCOUNTER — OFFICE VISIT (OUTPATIENT)
Dept: BEHAVIORAL/MENTAL HEALTH CLINIC | Age: 54
End: 2024-05-07
Payer: COMMERCIAL

## 2024-05-07 DIAGNOSIS — F33.1 MAJOR DEPRESSIVE DISORDER, RECURRENT, MODERATE (HCC): Primary | ICD-10-CM

## 2024-05-07 PROCEDURE — 97140 MANUAL THERAPY 1/> REGIONS: CPT

## 2024-05-07 PROCEDURE — 90791 PSYCH DIAGNOSTIC EVALUATION: CPT | Performed by: PSYCHOLOGIST

## 2024-05-07 PROCEDURE — 97110 THERAPEUTIC EXERCISES: CPT
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
## 2024-05-07 NOTE — PROGRESS NOTES
90/90 hamstring 20 sec hold x 3, manual piriformis stretch, and R hip adductor stretch due to self difficulty 20 sec H x 3 in supine to reduce muscle tension and pain. Painful stretch with focus more on gentle stretches.                      Pt Education:         ASSESSMENT     Assessment: Assessment: Pt. review with current HEP. Pt. needed some vc's to increase technique and participation tolerance for HEP. Pt. demo's she is limited by pain and technique with some ther ex. Assisted pt with manual passive stretches to assist with reducing tension and pain. Pt. reports back pain reduces to 2/10 post; however, pt. became teary eyes and c/o L knee pain increas during intervention. Pt. appears limited by increase low back and B knee pain L > R. Pt. reports she is bone on bone. Left knee injection reported by dr. Taylor 3 days ago. Pt. reports she is trying to get approved for more pain reduction injections for back and other knee. Added butterfly and seated hamstring stretch to HEP to reduce tension and pain. Continue POC.  Body Structures, Functions, Activity Limitations Requiring Skilled Therapeutic Intervention: Decreased functional mobility , Decreased ROM, Decreased strength, Decreased posture, Increased pain    Post-Treatment Pain Level:      No data recorded  Therapy Prognosis: Fair       GOALS   Patient Goals : to be able to move with less pain  Short Term Goals Completed by 2 wks Current Status Goal Status   Pt demonstrate I with HEP and report compliance with HEP 4/7 days per wk               Pt demonstrate and verbalize understanding of correct body mechanics during ADLs to decrease strain on spine and knees                                                                   Long Term Goals Completed by 4-5 wks Current Status Goal Status   Improve L knee ARoM 1-110 deg or greater to imrpove overall mobility       Improve lumar spine AROM any amount with less pain during AROM       Improve LE strength any amount

## 2024-05-09 ENCOUNTER — TELEPHONE (OUTPATIENT)
Dept: FAMILY MEDICINE CLINIC | Age: 54
End: 2024-05-09

## 2024-05-09 ENCOUNTER — HOSPITAL ENCOUNTER (OUTPATIENT)
Dept: PHYSICAL THERAPY | Age: 54
Setting detail: THERAPIES SERIES
Discharge: HOME | End: 2024-05-09
Payer: COMMERCIAL

## 2024-05-09 DIAGNOSIS — E11.65 TYPE 2 DIABETES MELLITUS WITH HYPERGLYCEMIA, WITHOUT LONG-TERM CURRENT USE OF INSULIN (HCC): ICD-10-CM

## 2024-05-09 PROCEDURE — 97110 THERAPEUTIC EXERCISES: CPT

## 2024-05-09 NOTE — PROGRESS NOTES
Physical Therapy  Physical Therapy: Daily Note   Patient: Lakshmi Rebolledo (54 y.o. female)   Examination Date: 2024  Plan of Care/Certification Expiration Date: 24    No data recorded   :  1970 # of Visits since SOC:   3   MRN: [id number]  CSN: [id number] Start of Care Date:   2024   Insurance: Payor: [employer] / Plan: [employer] [place] / Product Type: *No Product type* /   Insurance ID: [id number] - (Commercial) Secondary Insurance (if applicable):    Referring Physician: Mathieu Silvestre MD Dr. Cajigas   PCP: Mathieu Silvestre MD Visits to Date/Visits Approved: 3 /      No Show/Cancelled Appts: 0 / 0     Medical Diagnosis: No admission diagnoses are documented for this encounter. chronic midlne LBP wihtout sciatica chronic pain bilat knees  Treatment Diagnosis: chronic back pain, chronic bilat knee pain        SUBJECTIVE EXAMINATION   Pain Level:      Patient Comments: Subjective: Pt. reports feeling tender and okay during the day with doing HEP. At night she is waking up with twitching and  legs start jumping, kicking, and in pain 8/10. Taking muscle relaxers helped but still had troube the next night as well. I think I have to talk to a doctor about my hips being bad. I can not be intimate without hips popping, so I stopped.    HEP Compliance: Fair        OBJECTIVE EXAMINATION   Restrictions:  Restrictions/Precautions: -- (Fall- about year a go due to L knee buckling- went down landed on L knee)   No data recorded No data recorded              TREATMENT     Exercises:      Treatment Reasoning    Exercise 1: supine QS x 10 R LE H 3 - 5, L LE x 5 H 3  Exercise 2: pelvic tilt x 10 H3  Exercise 3: seated LAQ x 10 R LE x 10 L LE. Pt. c/o grinding with L knee. Pt. able to tolerate 10 reps this date with left LAQ.  Exercise 4: scap retraction in sitting x 10 H 3  Exercise 5: Deferred butterfly stretch this date to reduce aggrivation with hip adductors.  Exercise 6: Seated hamstring stretch 10 sec

## 2024-05-09 NOTE — TELEPHONE ENCOUNTER
Patient was asked to follow  up with PCP on ozempic prescription it's been a week and [facility] will not fill the ozempic stated  phys office needs to fax over to insurance stating why patient needs medication . Pt stated she needs this for diabetes .

## 2024-05-13 ENCOUNTER — HOSPITAL ENCOUNTER (OUTPATIENT)
Dept: PULMONOLOGY | Age: 54
Discharge: HOME | End: 2024-05-13
Payer: COMMERCIAL

## 2024-05-13 ENCOUNTER — HOSPITAL ENCOUNTER (OUTPATIENT)
Dept: WOMENS IMAGING | Age: 54
Discharge: HOME | End: 2024-05-15
Payer: COMMERCIAL

## 2024-05-13 DIAGNOSIS — R05.3 CHRONIC COUGH: ICD-10-CM

## 2024-05-13 DIAGNOSIS — R06.09 DYSPNEA ON EXERTION: ICD-10-CM

## 2024-05-13 DIAGNOSIS — R06.2 WHEEZING: ICD-10-CM

## 2024-05-13 DIAGNOSIS — Z12.31 ENCOUNTER FOR SCREENING MAMMOGRAM FOR MALIGNANT NEOPLASM OF BREAST: ICD-10-CM

## 2024-05-13 PROCEDURE — 6360000002 HC RX W HCPCS

## 2024-05-13 PROCEDURE — 77063 BREAST TOMOSYNTHESIS BI: CPT

## 2024-05-13 PROCEDURE — 94726 PLETHYSMOGRAPHY LUNG VOLUMES: CPT

## 2024-05-13 PROCEDURE — 94729 DIFFUSING CAPACITY: CPT

## 2024-05-13 PROCEDURE — 94060 EVALUATION OF WHEEZING: CPT

## 2024-05-13 RX ORDER — ALBUTEROL SULFATE 2.5 MG/3ML
SOLUTION RESPIRATORY (INHALATION)
Status: COMPLETED
Start: 2024-05-13 | End: 2024-05-13

## 2024-05-13 RX ADMIN — ALBUTEROL SULFATE 2.5 MG: 2.5 SOLUTION RESPIRATORY (INHALATION) at 14:13

## 2024-05-14 ENCOUNTER — HOSPITAL ENCOUNTER (OUTPATIENT)
Dept: PHYSICAL THERAPY | Age: 54
Setting detail: THERAPIES SERIES
Discharge: HOME | End: 2024-05-14
Payer: COMMERCIAL

## 2024-05-14 DIAGNOSIS — R92.8 ABNORMALITY OF RIGHT BREAST ON SCREENING MAMMOGRAM: Primary | ICD-10-CM

## 2024-05-14 PROCEDURE — 97110 THERAPEUTIC EXERCISES: CPT

## 2024-05-14 ASSESSMENT — PAIN DESCRIPTION - ORIENTATION: ORIENTATION: RIGHT;LEFT

## 2024-05-14 ASSESSMENT — PAIN SCALES - GENERAL: PAINLEVEL_OUTOF10: 3

## 2024-05-14 ASSESSMENT — PAIN DESCRIPTION - LOCATION: LOCATION: BACK;KNEE

## 2024-05-14 ASSESSMENT — PAIN DESCRIPTION - PAIN TYPE: TYPE: CHRONIC PAIN

## 2024-05-14 ASSESSMENT — PAIN DESCRIPTION - DESCRIPTORS: DESCRIPTORS: ACHING;TENDER;SORE

## 2024-05-14 NOTE — PROGRESS NOTES
Physical Therapy: Daily Note   Patient: Lakshmi Rebolledo (54 y.o. female)   Examination Date: 2024  Plan of Care/Certification Expiration Date: 24    No data recorded   :  1970 # of Visits since SOC:   4   MRN: [id number]  CSN: [id number] Start of Care Date:   2024   Insurance: Payor: [employer] / Plan: [employer] [place] / Product Type: *No Product type* /   Insurance ID: [id number] - (Commercial) Secondary Insurance (if applicable):    Referring Physician: Mathieu Silvestre MD     PCP: Mathieu Silvestre MD Visits to Date/Visits Approved: 4 /      No Show/Cancelled Appts: 0 / 0     Medical Diagnosis: No admission diagnoses are documented for this encounter.    Treatment Diagnosis: chronic back pain, chronic bilat knee pain        SUBJECTIVE EXAMINATION   Pain Level: Pain Screening  Patient Currently in Pain: Yes  Pain Assessment: 0-10  Pain Level: 3  Best Pain Level: 3  Worst Pain Level: 10  Pain Type: Chronic pain  Pain Location: Back, Knee  Pain Orientation: Right, Left  Pain Descriptors: Aching, Tender, Sore    Patient Comments: Subjective: Pt. reported that she turned when she was holding her dog and her left knee gave out and she fell into a desk and rolled back into the wall and ended up on the ground with increased general pain in the knee and bruising in the arm/buttock from where she fell. She did have to lay around and rest and ice for a day and is starting to feel better. 3/10 pain in th eknee and back is a little more tender from standing for a mamogram yesterday with 5/10 oain currently.    HEP Compliance: Good        OBJECTIVE EXAMINATION   Restrictions:  Restrictions/Precautions: -- (Fall- about year a go due to L knee buckling- went down landed on L knee)   No data recorded No data recorded              TREATMENT     Exercises:      Treatment Reasoning    Exercise 1: supine QS x 10 R LE H 3 - 5, L LE x10 H 3 with towel roll  Exercise 2: pelvic tilt x 15 H3  Exercise 3: LAQ x 10

## 2024-05-14 NOTE — RESULT ENCOUNTER NOTE
Please notify patient that recent mammogram was reported to show a 0.6 cm focal asymmetry in the right upper outer breast for which radiology recommends diagnostic mammogram and ultrasound to further evaluate.  No suspicious findings were noted in the left breast.  I placed follow-up diagnostic mammogram and ultrasound orders of the right breast in her chart.  Please help arrange this follow-up imaging.

## 2024-05-15 ENCOUNTER — HOSPITAL ENCOUNTER (OUTPATIENT)
Dept: MRI IMAGING | Age: 54
Discharge: HOME | End: 2024-05-17
Attending: STUDENT IN AN ORGANIZED HEALTH CARE EDUCATION/TRAINING PROGRAM
Payer: COMMERCIAL

## 2024-05-15 DIAGNOSIS — S32.030D CLOSED WEDGE COMPRESSION FRACTURE OF L3 VERTEBRA WITH ROUTINE HEALING: ICD-10-CM

## 2024-05-15 DIAGNOSIS — M47.816 LUMBAR SPONDYLOSIS: ICD-10-CM

## 2024-05-15 PROCEDURE — 72148 MRI LUMBAR SPINE W/O DYE: CPT

## 2024-05-16 ENCOUNTER — HOSPITAL ENCOUNTER (OUTPATIENT)
Dept: DIABETES SERVICES | Age: 54
Setting detail: THERAPIES SERIES
Discharge: HOME | End: 2024-05-16
Payer: COMMERCIAL

## 2024-05-16 ENCOUNTER — TELEPHONE (OUTPATIENT)
Dept: OBGYN CLINIC | Age: 54
End: 2024-05-16

## 2024-05-16 ENCOUNTER — HOSPITAL ENCOUNTER (OUTPATIENT)
Dept: ULTRASOUND IMAGING | Age: 54
Discharge: HOME | End: 2024-05-18
Payer: COMMERCIAL

## 2024-05-16 ENCOUNTER — HOSPITAL ENCOUNTER (OUTPATIENT)
Dept: PHYSICAL THERAPY | Age: 54
Setting detail: THERAPIES SERIES
Discharge: HOME | End: 2024-05-16
Payer: COMMERCIAL

## 2024-05-16 ENCOUNTER — HOSPITAL ENCOUNTER (OUTPATIENT)
Dept: WOMENS IMAGING | Age: 54
Discharge: HOME | End: 2024-05-18
Payer: COMMERCIAL

## 2024-05-16 DIAGNOSIS — R92.8 ABNORMALITY OF RIGHT BREAST ON SCREENING MAMMOGRAM: ICD-10-CM

## 2024-05-16 PROCEDURE — G0279 TOMOSYNTHESIS, MAMMO: HCPCS

## 2024-05-16 PROCEDURE — 97140 MANUAL THERAPY 1/> REGIONS: CPT

## 2024-05-16 PROCEDURE — 76642 ULTRASOUND BREAST LIMITED: CPT

## 2024-05-16 PROCEDURE — 97110 THERAPEUTIC EXERCISES: CPT

## 2024-05-16 PROCEDURE — G0108 DIAB MANAGE TRN  PER INDIV: HCPCS

## 2024-05-16 SDOH — ECONOMIC STABILITY: FOOD INSECURITY: ADDITIONAL INFORMATION: YES

## 2024-05-16 ASSESSMENT — PROBLEM AREAS IN DIABETES QUESTIONNAIRE (PAID)
FEELING THAT DIABETES IS TAKING UP TOO MUCH OF YOUR MENTAL AND PHYSICAL ENERGY EVERY DAY: MINOR PROBLEM
PAID-5 TOTAL SCORE: 2
FEELING DEPRESSED WHEN YOU THINK ABOUT LIVING WITH DIABETES: MINOR PROBLEM
FEELING SCARED WHEN YOU THINK ABOUT LIVING WITH DIABETES: NOT A PROBLEM
COPING WITH COMPLICATIONS OF DIABETES: NOT A PROBLEM
WORRYING ABOUT THE FUTURE AND THE POSSIBILITY OF SERIOUS COMPLICATIONS: NOT A PROBLEM

## 2024-05-16 ASSESSMENT — SLEEP AND FATIGUE QUESTIONNAIRES
HOW DO YOU RATE THE QUALITY OF YOUR SLEEP: POOR
HOW MANY HOURS OF SLEEP ARE YOU GETTING, ON AVERAGE: LESS THAN 7
HAVE YOU BEEN TOLD, OR NOTICED ON YOUR OWN, THAT YOU STOP BREATHING OR STRUGGLE TO BREATHE IN YOUR SLEEP: YES

## 2024-05-16 NOTE — TELEPHONE ENCOUNTER
Called pt left message on voicemail to follow up on surgery scheduling with  . ( Was awaiting visit with , pt seen 5-6-24)

## 2024-05-16 NOTE — PROGRESS NOTES
instruction    Post- Education Eval      Pathophysiology of diabetes  Define diabetes & Insulin resistance Identify own type of diabetes; list 3 options for treating diabetes  Assessment Ratin  24  Diabetes Education assessment completed today.   Patient has:  [] No knowledge of diabetes disease process   [] Limited knowledge of diabetes disease process  [x] Good knowledge of diabetes disease process.     In this session, the role of the pancreas, insulin, and the liver in glucose utilization and insulin resistance was   []  Introduced  [x]  Reviewed.       [x] ADA booklet “Where Do I Begin” used to reinforce teaching.  Suzette Henson RN   [] Discussed basic pathophysiology of diabetes with the group including the pancreas, insulin, insulin resistance and the liver's involvement.     [] Reviewed the different types of DM, risk factors, diagnosis, symptoms and the treatment options.  Evaluation rating:  []1  []2  []3  [] 4  []NA [] NC    Able to list 3 options for treating diabetes:  []Yes []No    Recent Health problems?  []Yes []No   Being Active  Verbalize effect of exercise on blood glucose levels. Assessment Ratin  24    Patient is   [] currently being active daily       Patient currently engages in the        following activities:    [x] not currently being active daily.     [] Reviewed effects of exercise on glycemic control, risks and benefits, precautions.      Patient       [x]has co- morbidities      [] has no co-morbidities that recommend being seen by PCM prior to starting exercise program for medical clearance.     [x] Briefly reviewed guidelines for frequency, duration, intensity for exercise.     Suzette Henson RN     [] Reviewed effects of activity on glycemic control and weight loss, risks and benefits, and precautions.     [] Current recommendations for being active by the American Diabetes Association reviewed.     [] Discussed what patient is doing to stay active  Evaluation

## 2024-05-16 NOTE — PROGRESS NOTES
50% of the day       Improve Oswestry from 84% at time of IE to less than 50%       Improve MOD LEFS from 66% day of IE to 35% or less at itme of d/c       I with advanced HEP to further imrpove ROM amd strength                                    TREATMENT PLAN   Plan Frequency: 1-2  Plan weeks: 4-6 (possibly up to 8 wks pending progress)  Current Treatment Recommendations: Strengthening, ROM, Functional mobility training, Neuromuscular re-education, Gait training, Home exercise program, Safety education & training, Patient/Caregiver education & training, Therapeutic activities, Manual, Modalities           Therapy Time  Individual Time In:       1100  Individual Time Out:  1145  Minutes:  45        Electronically signed by Radha Beckford PTA  on 5/16/2024 at 4:51 PM   POC NOTE

## 2024-05-17 ENCOUNTER — PREP FOR PROCEDURE (OUTPATIENT)
Dept: OBGYN CLINIC | Age: 54
End: 2024-05-17

## 2024-05-17 ENCOUNTER — TELEPHONE (OUTPATIENT)
Dept: PAIN MANAGEMENT | Age: 54
End: 2024-05-17

## 2024-05-17 DIAGNOSIS — N95.0 PMB (POSTMENOPAUSAL BLEEDING): ICD-10-CM

## 2024-05-17 DIAGNOSIS — E66.01 MORBID OBESITY (HCC): ICD-10-CM

## 2024-05-17 RX ORDER — SODIUM CHLORIDE 0.9 % (FLUSH) 0.9 %
5-40 SYRINGE (ML) INJECTION EVERY 12 HOURS SCHEDULED
Status: CANCELLED | OUTPATIENT
Start: 2024-05-17

## 2024-05-17 RX ORDER — SODIUM CHLORIDE 9 MG/ML
INJECTION, SOLUTION INTRAVENOUS PRN
Status: CANCELLED | OUTPATIENT
Start: 2024-05-17

## 2024-05-17 RX ORDER — SODIUM CHLORIDE 0.9 % (FLUSH) 0.9 %
5-40 SYRINGE (ML) INJECTION PRN
Status: CANCELLED | OUTPATIENT
Start: 2024-05-17

## 2024-05-17 NOTE — TELEPHONE ENCOUNTER
Pt called in , has a test scheduled on 6-6-24 & 6-7-24 for . She would like to set up surgery as she still is passing clots.     Surgery set up for 6-17-24. Pat 6-12-24 @ 11:00 am . Advised pt to set up a follow up with  after testing before her PAT.    Will mail letter with all information discussed.

## 2024-05-17 NOTE — TELEPHONE ENCOUNTER
I received a denial letter from ins. Re:LEFT GENICULAR NERVE BLOCK, for the following reason;    The reason for this decision was the criteria has not been met. Genicular nerve is a sensory nerve that surrouns the knee and provides innervation for the joint. Genicular nerve blocks and radiofrequency neurotomy are emerging interventions for knee pain.    The limited evidence regarding GN RFA for determining appropriateness of treatment w/ablation has reached conflicting results, ti may be effective for relief of pain, but further research is needed to establish safety and efficacy.     ---Insurance will not cover this type of procedure, please advise provider and patient.---

## 2024-05-20 ENCOUNTER — OFFICE VISIT (OUTPATIENT)
Dept: ORTHOPEDIC SURGERY | Age: 54
End: 2024-05-20
Payer: COMMERCIAL

## 2024-05-20 ENCOUNTER — HOSPITAL ENCOUNTER (OUTPATIENT)
Dept: SLEEP CENTER | Age: 54
Discharge: HOME | End: 2024-05-22
Payer: COMMERCIAL

## 2024-05-20 VITALS
WEIGHT: 293 LBS | HEART RATE: 89 BPM | OXYGEN SATURATION: 96 % | TEMPERATURE: 97.3 F | BODY MASS INDEX: 59.07 KG/M2 | HEIGHT: 59 IN

## 2024-05-20 DIAGNOSIS — R06.83 SNORING: ICD-10-CM

## 2024-05-20 DIAGNOSIS — N63.10 MASS OF MULTIPLE SITES OF RIGHT BREAST: Primary | ICD-10-CM

## 2024-05-20 DIAGNOSIS — G47.10 HYPERSOMNIA: ICD-10-CM

## 2024-05-20 DIAGNOSIS — E66.01 MORBID OBESITY WITH BMI OF 60.0-69.9, ADULT (HCC): Primary | ICD-10-CM

## 2024-05-20 DIAGNOSIS — J98.4 RESTRICTIVE LUNG DISEASE: Primary | ICD-10-CM

## 2024-05-20 DIAGNOSIS — M48.061 SPINAL STENOSIS OF LUMBAR REGION, UNSPECIFIED WHETHER NEUROGENIC CLAUDICATION PRESENT: ICD-10-CM

## 2024-05-20 PROCEDURE — 95810 POLYSOM 6/> YRS 4/> PARAM: CPT

## 2024-05-20 PROCEDURE — 99214 OFFICE O/P EST MOD 30 MIN: CPT | Performed by: ORTHOPAEDIC SURGERY

## 2024-05-20 RX ORDER — SEMAGLUTIDE 0.68 MG/ML
INJECTION, SOLUTION SUBCUTANEOUS
COMMUNITY
Start: 2024-05-12
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
## 2024-05-21 ENCOUNTER — HOSPITAL ENCOUNTER (OUTPATIENT)
Dept: PHYSICAL THERAPY | Age: 54
Setting detail: THERAPIES SERIES
Discharge: HOME | End: 2024-05-21
Payer: COMMERCIAL

## 2024-05-21 ENCOUNTER — TELEMEDICINE (OUTPATIENT)
Dept: FAMILY MEDICINE CLINIC | Age: 54
End: 2024-05-21
Payer: COMMERCIAL

## 2024-05-21 DIAGNOSIS — R06.09 DYSPNEA ON EXERTION: Primary | ICD-10-CM

## 2024-05-21 DIAGNOSIS — J98.4 RESTRICTIVE LUNG DISEASE: ICD-10-CM

## 2024-05-21 DIAGNOSIS — Z72.0 TOBACCO USE: ICD-10-CM

## 2024-05-21 PROCEDURE — 99213 OFFICE O/P EST LOW 20 MIN: CPT | Performed by: FAMILY MEDICINE

## 2024-05-21 RX ORDER — VARENICLINE TARTRATE 0.5 MG/1
.5-1 TABLET, FILM COATED ORAL SEE ADMIN INSTRUCTIONS
Qty: 57 TABLET | Refills: 0 | Status: SHIPPED | OUTPATIENT
Start: 2024-05-21

## 2024-05-21 RX ORDER — VARENICLINE TARTRATE 1 MG/1
1 TABLET, FILM COATED ORAL 2 TIMES DAILY
Qty: 60 TABLET | Refills: 2 | Status: SHIPPED | OUTPATIENT
Start: 2024-05-21

## 2024-05-21 ASSESSMENT — ENCOUNTER SYMPTOMS
NAUSEA: 0
SHORTNESS OF BREATH: 1
VOMITING: 0
WHEEZING: 1
COUGH: 0
CHEST TIGHTNESS: 0

## 2024-05-21 NOTE — ASSESSMENT & PLAN NOTE
Noted on recent pulmonary function testing.  Patient has been referred to pulmonology to establish care and discuss further long-term management.  Patient requires referral to specific pulmonologist due to other family members also receiving care from this physician.

## 2024-05-21 NOTE — ASSESSMENT & PLAN NOTE
Patient pre-contemplative and is aware that smoking cessation would be the best thing for overall health. We reviewed cessation aids including medication and nicotine replacement therapy.  Patient agrees to start Chantix.  She was given prescription for starter pack and continuing packs.  Patient was instructed to start medication 1 week prior to her quit date.  We will continue to encourage complete smoking cessation at subsequent visits.

## 2024-05-21 NOTE — PROGRESS NOTES
Physical Therapy: Daily Note   Patient: Lakshmi Rebolledo (54 y.o. female)   Examination Date: 2024  Plan of Care/Certification Expiration Date: 24    No data recorded   :  1970 # of Visits since SOC:   6   MRN: [id number]  CSN: [id number] Start of Care Date:   2024   Insurance: Payor: [employer] / Plan: [employer] [place] / Product Type: *No Product type* /   Insurance ID: [id number] - (Commercial) Secondary Insurance (if applicable):    Referring Physician: Mathieu Silvestre MD Dr. Cajigas   PCP: Mathieu Silvestre MD Visits to Date/Visits Approved:       No Show/Cancelled Appts: 0      Medical Diagnosis: No admission diagnoses are documented for this encounter.    Treatment Diagnosis: chronic back pain, chronic bilat knee pain        SUBJECTIVE EXAMINATION   Patient Comments: Subjective: Pt called to cx this date as \"too fatigued- did not get enough sleep last night.\"    Therapy Time  Individual Time In:         Individual Time Out:    Minutes:  0 cx        Electronically signed by Medina Irby PT  on 2024 at 10:41 AM   POC NOTE

## 2024-05-21 NOTE — PROGRESS NOTES
2024    TELEHEALTH EVALUATION -- Audio/Visual    HPI:    Lakshmi Rebolledo (: 1970) is a 54 y.o. female, Established patient, who has requested an audio/video evaluation for the following concern(s):    Chief Complaint   Patient presents with    Other     Pt would like to stop smoking and would like to discuss ways of doing that         Assessment & Plan     1. Dyspnea on exertion  -     Ambulatory referral to Pulmonology  2. Restrictive lung disease  Assessment & Plan:  Noted on recent pulmonary function testing.  Patient has been referred to pulmonology to establish care and discuss further long-term management.  Patient requires referral to specific pulmonologist due to other family members also receiving care from this physician.   Orders:  -     Ambulatory referral to Pulmonology  3. Tobacco use  Assessment & Plan:  Patient pre-contemplative and is aware that smoking cessation would be the best thing for overall health. We reviewed cessation aids including medication and nicotine replacement therapy.  Patient agrees to start Chantix.  She was given prescription for starter pack and continuing packs.  Patient was instructed to start medication 1 week prior to her quit date.  We will continue to encourage complete smoking cessation at subsequent visits.   Orders:  -     varenicline (CHANTIX CONTINUING MONTH IRVING) 1 MG tablet; Take 1 tablet by mouth 2 times daily, Disp-60 tablet, R-2Normal  -     varenicline (CHANTIX) 0.5 MG tablet; Take 1-2 tablets by mouth See Admin Instructions 0.5mg DAILY for 3 days followed by 0.5mg TWICE DAILY for 4 days followed by 1mg TWICE DAILY, Disp-57 tablet, R-0Normal           Return in about 3 months (around 2024) for Chronic Disease Check.        Subjective     HPI    Patient presents for virtual video visit  to discuss smoking cessation aids.  Patient has a longstanding history of cigarette smoking.  She reports starting cigarette use at 12 years old and smoking 2

## 2024-05-23 ENCOUNTER — HOSPITAL ENCOUNTER (OUTPATIENT)
Dept: PHYSICAL THERAPY | Age: 54
Setting detail: THERAPIES SERIES
Discharge: HOME | End: 2024-05-23
Payer: COMMERCIAL

## 2024-05-23 NOTE — PROGRESS NOTES
Physical Therapy: Daily Note   Patient: Lakshmi Rebolledo (54 y.o. female)   Examination Date: 2024  Plan of Care/Certification Expiration Date: 24    No data recorded   :  1970 # of Visits since SOC:   6   MRN: [id number]  CSN: [id number] Start of Care Date:   2024   Insurance: Payor: MINA / Plan: [employer] [place] / Product Type: *No Product type* /   Insurance ID: [id number] - (Commercial) Secondary Insurance (if applicable):    Referring Physician: Mathieu Silvestre MD Dr. Cajigas   PCP: Mathieu Silvestre MD Visits to Date/Visits Approved:       No Show/Cancelled Appts:      Medical Diagnosis: No admission diagnoses are documented for this encounter.    Treatment Diagnosis: chronic back pain, chronic bilat knee pain        SUBJECTIVE EXAMINATION   Pain Level:      Patient Comments: Subjective: Called to cancel this AM, - no reason given            OBJECTIVE EXAMINATION   Restrictions:  Restrictions/Precautions: -- (Fall- about year a go due to L knee buckling- went down landed on L knee)   No data recorded No data recorded           GOALS   Patient Goals : to be able to move with less pain  Short Term Goals Completed by 2 wks Current Status Goal Status   Pt demonstrate I with HEP and report compliance with HEP 4/7 days per wk       Pt report any decrease in back and knee pain during ADLs       Pt demonstrate and verbalize understanding of correct body mechanics during ADLs to decrease strain on spine and knees                                                                   Long Term Goals Completed by   Current Status Goal Status   Improve L knee ARoM 1-110 deg or greater to imrpove overall mobility       Improve lumar spine AROM any amount with less pain during AROM       Improve LE strength any amount to improve ability to perform ADLs       Pt perform ADLs including selfcare activities light housework and short distance ambulation with 2-3/10 L knee pain or less and 5/10 low

## 2024-05-30 ENCOUNTER — HOSPITAL ENCOUNTER (OUTPATIENT)
Dept: PHYSICAL THERAPY | Age: 54
Setting detail: THERAPIES SERIES
Discharge: HOME | End: 2024-05-30
Payer: COMMERCIAL

## 2024-05-30 PROCEDURE — 97530 THERAPEUTIC ACTIVITIES: CPT

## 2024-05-30 PROCEDURE — 97110 THERAPEUTIC EXERCISES: CPT

## 2024-05-30 ASSESSMENT — PAIN DESCRIPTION - LOCATION: LOCATION: BACK;KNEE

## 2024-05-30 ASSESSMENT — PAIN DESCRIPTION - PAIN TYPE: TYPE: CHRONIC PAIN

## 2024-05-30 ASSESSMENT — PAIN DESCRIPTION - ORIENTATION: ORIENTATION: RIGHT;LEFT

## 2024-05-30 ASSESSMENT — PAIN SCALES - GENERAL: PAINLEVEL_OUTOF10: 4

## 2024-05-30 ASSESSMENT — PAIN DESCRIPTION - DESCRIPTORS: DESCRIPTORS: ACHING;TENDER;SORE

## 2024-05-30 NOTE — PROGRESS NOTES
Physical Therapy: Discharge Summary   Patient: Lakshmi Rebolledo (54 y.o. female)   Examination Date: 2024  Plan of Care/Certification Expiration Date: 24    No data recorded   :  1970 # of Visits since SOC:   6   MRN: [id number]  CSN: [id number] Start of Care Date:   2024   Insurance: Payor: MINA / Plan: [employer] [place] / Product Type: *No Product type* /   Insurance ID: [id number] - (Commercial) Secondary Insurance (if applicable):    Referring Physician: Mathieu Silvestre MD Dr. Cajigas   PCP: Mathieu Silvestre MD Visits to Date/Visits Approved:  /      No Show/Cancelled Appts: 0      Medical Diagnosis: No admission diagnoses are documented for this encounter.    Treatment Diagnosis: chronic back pain, chronic bilat knee pain        SUBJECTIVE EXAMINATION   Pain Level: Pain Screening  Patient Currently in Pain: Yes  Pain Assessment: 0-10  Pain Level: 4  Best Pain Level: 3 (L knee 1-2/10, back 4/10)  Worst Pain Level: 8  Pain Type: Chronic pain  Pain Location: Back, Knee  Pain Orientation: Right, Left  Pain Descriptors: Aching, Tender, Sore    Patient Comments: Subjective: Pt reports minimal improvement so far, still having a lot of problems with standing, walking and most activity. She reports wanting to see doctor about getting nerve block shot. She has a total of 23 other appointments for other medical issues this month. She would like to be discharged at this point but would be willing to come back if other things get figured out.    HEP Compliance: Good        OBJECTIVE EXAMINATION   Restrictions:  Restrictions/Precautions: -- (Fall- about year a go due to L knee buckling- went down landed on L knee)   No data recorded No data recorded          Lumbar Assessment     AROM Lumbar Spine   Lumbar Spine AROM : WNL  Lumbar spine general AROM: non painful today  Flexion: 90%  Extension: 90% normal  Lateral Flexion Right: 90% normal  Lateral Flexion Left: 90% normal  Right Rotation:

## 2024-06-05 ENCOUNTER — OFFICE VISIT (OUTPATIENT)
Age: 54
End: 2024-06-05
Payer: COMMERCIAL

## 2024-06-05 VITALS
DIASTOLIC BLOOD PRESSURE: 86 MMHG | HEIGHT: 59 IN | BODY MASS INDEX: 59.07 KG/M2 | WEIGHT: 293 LBS | SYSTOLIC BLOOD PRESSURE: 130 MMHG | TEMPERATURE: 96.9 F

## 2024-06-05 DIAGNOSIS — M47.816 LUMBAR SPONDYLOSIS: Primary | ICD-10-CM

## 2024-06-05 DIAGNOSIS — G89.29 CHRONIC PAIN OF BOTH KNEES: ICD-10-CM

## 2024-06-05 DIAGNOSIS — M25.562 CHRONIC PAIN OF BOTH KNEES: ICD-10-CM

## 2024-06-05 DIAGNOSIS — M25.561 CHRONIC PAIN OF BOTH KNEES: ICD-10-CM

## 2024-06-05 PROCEDURE — 3079F DIAST BP 80-89 MM HG: CPT | Performed by: STUDENT IN AN ORGANIZED HEALTH CARE EDUCATION/TRAINING PROGRAM

## 2024-06-05 PROCEDURE — 3075F SYST BP GE 130 - 139MM HG: CPT | Performed by: STUDENT IN AN ORGANIZED HEALTH CARE EDUCATION/TRAINING PROGRAM

## 2024-06-05 PROCEDURE — 99214 OFFICE O/P EST MOD 30 MIN: CPT | Performed by: STUDENT IN AN ORGANIZED HEALTH CARE EDUCATION/TRAINING PROGRAM

## 2024-06-05 ASSESSMENT — ENCOUNTER SYMPTOMS: BACK PAIN: 1

## 2024-06-05 NOTE — PROGRESS NOTES
[facility] [place] [facility], [facility]  [address] [address]  Dept: [phone]  Dept Fax: [phone]  Loc: [phone]     6/5/2024    Visit type: Established     Reason for Visit: Back Pain and Knee Pain (Bilateral )       ASSESSMENT/PLAN   1. Lumbar spondylosis  Overview:  Chronic illness with a severe exacerbation and/or progression which affects ADL's. Pain has been present for multiple years and is expected to last at least a year. Patient is unable to sleep, transfer, nor ambulate. Goals of care include pain relief >50% and ability to perform ADLs with less pain.     54-year-old female with history of chronic back pain multiple years.  Pain is extremely debilitating affects ADLs.  Pain score 6 or greater with activity.  Pain is mostly located in the lower lumbar region without radiating symptoms.     Patient displays chronic low axial back pain with no radiation.  Extension and rotation of the lumbar spine elicits pain. Pain is located bilateral .  Patient displays tenderness to palpation in the bilateral  lumbar paraspinal musculature.  Positive facet loading bilateral .      Lumbar spine x-ray:  FINDINGS:  6 views lumbar spine are submitted.     There are 5 lumbar type vertebra.     The disc spaces are intact.     No significant listhesis.     No spondylolysis.     There is mild degenerative changes posterior facets lower lumbar spine.  There is anterior spondylosis..     The SI joints are intact.     Age indeterminate compression fractures out, wedge deformity of the L3  vertebra.     IMPRESSION:  Multilevel osteoarthritis of the lumbar spine     Age indeterminate compression fractures/,wedge deformity of the L3 vertebra.     - Duloxetine 30 mg daily  - Meloxicam 7.5 mg twice daily as needed  - Tizanidine 2 mg 3 times daily as needed  Assessment & Plan:     -Continue PT exercises  -Bilateral L4-L5 and L5-S1 mbb in the future after

## 2024-06-05 NOTE — ASSESSMENT & PLAN NOTE
-Recently received intra-articular steroid injections with no relief  -Genicular nerve blocks denied by insurance  -Will consider appeal in the future once other medical issues are addressed

## 2024-06-05 NOTE — PROGRESS NOTES
HPI  Location:Back Pain (Lower back pain ) and Knee Pain (Bilateral )   Knee - Patient states that her pain is at a 2 at rest and a 4 with activity on the pain scale.   Back - Patient states that her pain is at a 4 at rest and a 8 with activity on the pain scale.   Patient is currently prescribed DULoxetine (CYMBALTA) 30 MG extended release capsule. Patient states she receives some relief from medication.  Patient is currently prescribed meloxicam (MOBIC) 15 MG tablet . Patient states she receives some relief from medication.  Patient is currently prescribed tiZANidine (ZANAFLEX) 2 MG tablet . Patient states she receives some relief from medication.

## 2024-06-05 NOTE — ASSESSMENT & PLAN NOTE
-Continue PT exercises  -Bilateral L4-L5 and L5-S1 mbb in the future after recovering from surgery

## 2024-06-06 ENCOUNTER — HOSPITAL ENCOUNTER (OUTPATIENT)
Dept: NUCLEAR MEDICINE | Age: 54
Discharge: HOME | End: 2024-06-08
Attending: INTERNAL MEDICINE
Payer: COMMERCIAL

## 2024-06-06 ENCOUNTER — HOSPITAL ENCOUNTER (OUTPATIENT)
Age: 54
Discharge: HOME | End: 2024-06-08
Attending: INTERNAL MEDICINE
Payer: COMMERCIAL

## 2024-06-06 VITALS
BODY MASS INDEX: 59.07 KG/M2 | HEIGHT: 59 IN | WEIGHT: 293 LBS | DIASTOLIC BLOOD PRESSURE: 86 MMHG | SYSTOLIC BLOOD PRESSURE: 130 MMHG

## 2024-06-06 DIAGNOSIS — R06.09 DOE (DYSPNEA ON EXERTION): ICD-10-CM

## 2024-06-06 LAB
ECHO AO ROOT DIAM: 2.8 CM
ECHO AO ROOT INDEX: 1.28 CM/M2
ECHO AV AREA PEAK VELOCITY: 1.3 CM2
ECHO AV AREA PEAK VELOCITY: 1.6 CM2
ECHO AV AREA PEAK VELOCITY: 1.8 CM2
ECHO AV AREA PEAK VELOCITY: 2.2 CM2
ECHO AV AREA VTI: 1.8 CM2
ECHO AV AREA/BSA VTI: 0.8 CM2/M2
ECHO AV CUSP MM: 1.8 CM
ECHO AV MEAN GRADIENT: 6 MMHG
ECHO AV MEAN VELOCITY: 1.2 M/S
ECHO AV PEAK GRADIENT: 11 MMHG
ECHO AV PEAK GRADIENT: 8 MMHG
ECHO AV PEAK VELOCITY: 1.4 M/S
ECHO AV PEAK VELOCITY: 1.7 M/S
ECHO AV VTI: 30.9 CM
ECHO BSA: 2.37 M2
ECHO EST RA PRESSURE: 8 MMHG
ECHO LA DIAMETER INDEX: 1.47 CM/M2
ECHO LA DIAMETER: 3.2 CM
ECHO LA TO AORTIC ROOT RATIO: 1.14
ECHO LA VOL A-L A2C: 32 ML (ref 22–52)
ECHO LA VOL A-L A4C: 53 ML (ref 22–52)
ECHO LA VOL MOD A2C: 30 ML (ref 22–52)
ECHO LA VOL MOD A4C: 49 ML (ref 22–52)
ECHO LA VOLUME AREA LENGTH: 46 ML
ECHO LA VOLUME INDEX A-L A2C: 15 ML/M2 (ref 16–34)
ECHO LA VOLUME INDEX A-L A4C: 24 ML/M2 (ref 16–34)
ECHO LA VOLUME INDEX AREA LENGTH: 21 ML/M2 (ref 16–34)
ECHO LA VOLUME INDEX MOD A2C: 14 ML/M2 (ref 16–34)
ECHO LA VOLUME INDEX MOD A4C: 22 ML/M2 (ref 16–34)
ECHO LV E' LATERAL VELOCITY: 10 CM/S
ECHO LV E' SEPTAL VELOCITY: 8 CM/S
ECHO LV EDV A2C: 60 ML
ECHO LV EDV A4C: 61 ML
ECHO LV EDV BP: 62 ML (ref 56–104)
ECHO LV EDV INDEX A4C: 28 ML/M2
ECHO LV EDV INDEX BP: 28 ML/M2
ECHO LV EDV NDEX A2C: 28 ML/M2
ECHO LV EJECTION FRACTION A2C: 57 %
ECHO LV EJECTION FRACTION A4C: 70 %
ECHO LV EJECTION FRACTION BIPLANE: 65 % (ref 55–100)
ECHO LV ESV A2C: 26 ML
ECHO LV ESV A4C: 19 ML
ECHO LV ESV BP: 22 ML (ref 19–49)
ECHO LV ESV INDEX A2C: 12 ML/M2
ECHO LV ESV INDEX A4C: 9 ML/M2
ECHO LV ESV INDEX BP: 10 ML/M2
ECHO LV FRACTIONAL SHORTENING: 40 % (ref 28–44)
ECHO LV INTERNAL DIMENSION DIASTOLE INDEX: 1.93 CM/M2
ECHO LV INTERNAL DIMENSION DIASTOLIC: 4.2 CM (ref 3.9–5.3)
ECHO LV INTERNAL DIMENSION SYSTOLIC INDEX: 1.15 CM/M2
ECHO LV INTERNAL DIMENSION SYSTOLIC: 2.5 CM
ECHO LV IVSD: 1 CM (ref 0.6–0.9)
ECHO LV IVSS: 1.2 CM
ECHO LV MASS 2D: 127.8 G (ref 67–162)
ECHO LV MASS INDEX 2D: 58.6 G/M2 (ref 43–95)
ECHO LV POSTERIOR WALL DIASTOLIC: 0.9 CM (ref 0.6–0.9)
ECHO LV POSTERIOR WALL SYSTOLIC: 1.4 CM
ECHO LV RELATIVE WALL THICKNESS RATIO: 0.43
ECHO LVOT AREA: 2.3 CM2
ECHO LVOT AV VTI INDEX: 0.8
ECHO LVOT DIAM: 1.7 CM
ECHO LVOT MEAN GRADIENT: 5 MMHG
ECHO LVOT PEAK GRADIENT: 4 MMHG
ECHO LVOT PEAK GRADIENT: 8 MMHG
ECHO LVOT PEAK VELOCITY: 1 M/S
ECHO LVOT PEAK VELOCITY: 1.4 M/S
ECHO LVOT STROKE VOLUME INDEX: 25.8 ML/M2
ECHO LVOT SV: 56.3 ML
ECHO LVOT VTI: 24.8 CM
ECHO MV A VELOCITY: 0.9 M/S
ECHO MV AREA PHT: 3.1 CM2
ECHO MV AREA VTI: 2.6 CM2
ECHO MV E DECELERATION TIME (DT): 248.2 MS
ECHO MV E VELOCITY: 0.78 M/S
ECHO MV E/A RATIO: 0.87
ECHO MV E/E' LATERAL: 7.8
ECHO MV E/E' RATIO (AVERAGED): 8.78
ECHO MV E/E' SEPTAL: 9.75
ECHO MV LVOT VTI INDEX: 0.87
ECHO MV MAX VELOCITY: 0.9 M/S
ECHO MV MEAN GRADIENT: 2 MMHG
ECHO MV MEAN VELOCITY: 0.6 M/S
ECHO MV PEAK GRADIENT: 3 MMHG
ECHO MV PRESSURE HALF TIME (PHT): 72.1 MS
ECHO MV VTI: 21.6 CM
ECHO PV MAX VELOCITY: 1.2 M/S
ECHO PV PEAK GRADIENT: 6 MMHG
ECHO RIGHT VENTRICULAR SYSTOLIC PRESSURE (RVSP): 30 MMHG
ECHO RV TAPSE: 2.6 CM (ref 1.7–?)
ECHO TV REGURGITANT MAX VELOCITY: 2.34 M/S
ECHO TV REGURGITANT PEAK GRADIENT: 22 MMHG

## 2024-06-06 PROCEDURE — 3430000000 HC RX DIAGNOSTIC RADIOPHARMACEUTICAL: Performed by: INTERNAL MEDICINE

## 2024-06-06 PROCEDURE — 6360000002 HC RX W HCPCS: Performed by: INTERNAL MEDICINE

## 2024-06-06 PROCEDURE — 78452 HT MUSCLE IMAGE SPECT MULT: CPT

## 2024-06-06 PROCEDURE — A9502 TC99M TETROFOSMIN: HCPCS | Performed by: INTERNAL MEDICINE

## 2024-06-06 PROCEDURE — 93017 CV STRESS TEST TRACING ONLY: CPT

## 2024-06-06 PROCEDURE — 2580000003 HC RX 258: Performed by: INTERNAL MEDICINE

## 2024-06-06 PROCEDURE — 93306 TTE W/DOPPLER COMPLETE: CPT

## 2024-06-06 RX ORDER — SODIUM CHLORIDE 0.9 % (FLUSH) 0.9 %
10 SYRINGE (ML) INJECTION PRN
Status: ACTIVE | OUTPATIENT
Start: 2024-06-06 | End: 2024-06-06

## 2024-06-06 RX ORDER — REGADENOSON 0.08 MG/ML
0.4 INJECTION, SOLUTION INTRAVENOUS
Status: COMPLETED | OUTPATIENT
Start: 2024-06-06 | End: 2024-06-06

## 2024-06-06 RX ADMIN — SODIUM CHLORIDE, PRESERVATIVE FREE 10 ML: 5 INJECTION INTRAVENOUS at 08:37

## 2024-06-06 RX ADMIN — REGADENOSON 0.4 MG: 0.08 INJECTION, SOLUTION INTRAVENOUS at 08:36

## 2024-06-06 RX ADMIN — TETROFOSMIN 36 MILLICURIE: 1.38 INJECTION, POWDER, LYOPHILIZED, FOR SOLUTION INTRAVENOUS at 08:36

## 2024-06-06 RX ADMIN — SODIUM CHLORIDE, PRESERVATIVE FREE 10 ML: 5 INJECTION INTRAVENOUS at 08:36

## 2024-06-07 ENCOUNTER — HOSPITAL ENCOUNTER (OUTPATIENT)
Dept: NUCLEAR MEDICINE | Age: 54
Discharge: HOME | End: 2024-06-09
Attending: INTERNAL MEDICINE
Payer: COMMERCIAL

## 2024-06-07 PROCEDURE — A9502 TC99M TETROFOSMIN: HCPCS | Performed by: INTERNAL MEDICINE

## 2024-06-07 PROCEDURE — 3430000000 HC RX DIAGNOSTIC RADIOPHARMACEUTICAL: Performed by: INTERNAL MEDICINE

## 2024-06-07 RX ADMIN — TETROFOSMIN 35.8 MILLICURIE: 1.38 INJECTION, POWDER, LYOPHILIZED, FOR SOLUTION INTRAVENOUS at 08:15

## 2024-06-09 LAB
ECHO BSA: 2.37 M2
NUC STRESS EJECTION FRACTION: 72 %
STRESS BASELINE DIAS BP: 81 MMHG
STRESS BASELINE HR: 76 BPM
STRESS BASELINE ST DEPRESSION: 0 MM
STRESS BASELINE SYS BP: 138 MMHG
STRESS ESTIMATED WORKLOAD: 1 METS
STRESS PEAK DIAS BP: 83 MMHG
STRESS PEAK SYS BP: 150 MMHG
STRESS PERCENT HR ACHIEVED: 75 %
STRESS POST PEAK HR: 125 BPM
STRESS RATE PRESSURE PRODUCT: NORMAL BPM*MMHG
STRESS ST DEPRESSION: 0 MM
STRESS TARGET HR: 167 BPM
TID: 1.02

## 2024-06-10 ENCOUNTER — TELEPHONE (OUTPATIENT)
Dept: OBGYN CLINIC | Age: 54
End: 2024-06-10

## 2024-06-11 NOTE — TELEPHONE ENCOUNTER
Clearance faxed to Dr. Ambrocio  
Faxed back cardiac clearance.  Scanned fax confirmation into media.   
Patient has follow up with Dr. Ambrocio for surgery clearance in July but her surgery is next week. Requesting surgery form be faxed to his office. Fax #[phone]  
Martin Powell(Attending)

## 2024-06-12 ENCOUNTER — TELEPHONE (OUTPATIENT)
Dept: OBGYN CLINIC | Age: 54
End: 2024-06-12

## 2024-06-12 ENCOUNTER — HOSPITAL ENCOUNTER (OUTPATIENT)
Dept: PREADMISSION TESTING | Age: 54
End: 2024-06-12
Attending: INTERNAL MEDICINE
Payer: COMMERCIAL

## 2024-06-12 VITALS
HEART RATE: 90 BPM | RESPIRATION RATE: 18 BRPM | OXYGEN SATURATION: 95 % | TEMPERATURE: 97.5 F | DIASTOLIC BLOOD PRESSURE: 78 MMHG | WEIGHT: 287.6 LBS | SYSTOLIC BLOOD PRESSURE: 124 MMHG | HEIGHT: 60 IN | BODY MASS INDEX: 56.46 KG/M2

## 2024-06-12 PROBLEM — F12.90 MARIJUANA USE, CONTINUOUS: Status: RESOLVED | Noted: 2024-03-28 | Resolved: 2024-06-12

## 2024-06-12 LAB
ANION GAP SERPL CALCULATED.3IONS-SCNC: 11 MEQ/L (ref 9–15)
BASOPHILS # BLD: 0.1 K/UL (ref 0–0.2)
BASOPHILS NFR BLD: 0.5 %
BUN SERPL-MCNC: 16 MG/DL (ref 6–20)
CALCIUM SERPL-MCNC: 9.6 MG/DL (ref 8.5–9.9)
CHLORIDE SERPL-SCNC: 103 MEQ/L (ref 95–107)
CO2 SERPL-SCNC: 24 MEQ/L (ref 20–31)
CREAT SERPL-MCNC: 0.83 MG/DL (ref 0.5–0.9)
EOSINOPHIL # BLD: 0.3 K/UL (ref 0–0.7)
EOSINOPHIL NFR BLD: 2.2 %
ERYTHROCYTE [DISTWIDTH] IN BLOOD BY AUTOMATED COUNT: 12.1 % (ref 11.5–14.5)
ESTIMATED AVERAGE GLUCOSE: 160 MG/DL
GLUCOSE SERPL-MCNC: 148 MG/DL (ref 70–99)
HBA1C MFR BLD: 7.2 % (ref 4–6)
HCT VFR BLD AUTO: 52 % (ref 37–47)
HGB BLD-MCNC: 17.7 G/DL (ref 12–16)
LYMPHOCYTES # BLD: 2.1 K/UL (ref 1–4.8)
LYMPHOCYTES NFR BLD: 14 %
MCH RBC QN AUTO: 32.7 PG (ref 27–31.3)
MCHC RBC AUTO-ENTMCNC: 34 % (ref 33–37)
MCV RBC AUTO: 96.1 FL (ref 79.4–94.8)
MONOCYTES # BLD: 1.1 K/UL (ref 0.2–0.8)
MONOCYTES NFR BLD: 7.6 %
NEUTROPHILS # BLD: 11.1 K/UL (ref 1.4–6.5)
NEUTS SEG NFR BLD: 75.3 %
PLATELET # BLD AUTO: 244 K/UL (ref 130–400)
POTASSIUM SERPL-SCNC: 4.2 MEQ/L (ref 3.4–4.9)
RBC # BLD AUTO: 5.41 M/UL (ref 4.2–5.4)
SODIUM SERPL-SCNC: 138 MEQ/L (ref 135–144)
WBC # BLD AUTO: 14.7 K/UL (ref 4.8–10.8)

## 2024-06-12 PROCEDURE — 85025 COMPLETE CBC W/AUTO DIFF WBC: CPT

## 2024-06-12 PROCEDURE — 80048 BASIC METABOLIC PNL TOTAL CA: CPT

## 2024-06-12 PROCEDURE — 83036 HEMOGLOBIN GLYCOSYLATED A1C: CPT

## 2024-06-12 ASSESSMENT — ENCOUNTER SYMPTOMS
PHOTOPHOBIA: 0
COUGH: 0
ABDOMINAL PAIN: 0
WHEEZING: 0
EYE REDNESS: 0
APNEA: 0
BACK PAIN: 1
RHINORRHEA: 0
VOMITING: 0
SHORTNESS OF BREATH: 0
CHOKING: 0
ABDOMINAL DISTENTION: 0
FACIAL SWELLING: 0
EYE PAIN: 0
SINUS PRESSURE: 0
EYE ITCHING: 0
SINUS PAIN: 0
EYE DISCHARGE: 0
DIARRHEA: 0
SORE THROAT: 0
TROUBLE SWALLOWING: 0
NAUSEA: 0
CONSTIPATION: 0
CHEST TIGHTNESS: 0

## 2024-06-12 NOTE — H&P
No wheezing, rhonchi or rales.      Comments: Diminished breath sounds all lobes  Abdominal:      General: Abdomen is flat. Bowel sounds are normal. There is no distension.      Palpations: Abdomen is soft.      Tenderness: There is no abdominal tenderness. There is no guarding.   Genitourinary:     Comments: Deferred  Musculoskeletal:         General: Normal range of motion.      Cervical back: Normal range of motion. No tenderness.      Right lower leg: Edema present.      Left lower leg: Edema present.   Lymphadenopathy:      Cervical: No cervical adenopathy.   Skin:     General: Skin is warm and dry.      Capillary Refill: Capillary refill takes less than 2 seconds.   Neurological:      General: No focal deficit present.      Mental Status: She is alert and oriented to person, place, and time.      Motor: No weakness.      Gait: Gait abnormal.   Psychiatric:         Mood and Affect: Mood normal.         Behavior: Behavior normal.         Thought Content: Thought content normal.         Judgment: Judgment normal.          /78   Pulse 90   Temp 97.5 °F (36.4 °C) (Temporal)   Resp 18   Ht 1.524 m (5')   Wt 130.5 kg (287 lb 9.6 oz)   LMP 01/27/2015   SpO2 95%   BMI 56.17 kg/m²         ASSESSMENT  Patient Active Problem List   Diagnosis    Morbid obesity with BMI of 60.0-69.9, adult (HCC)    History of abnormal cervical Pap smear    Chronic midline low back pain without sciatica    Postmenopausal bleeding    Primary hypertension    Depression    Abnormal LFTs (liver function tests)    Chronic pain of both knees    Dyspnea on exertion    Type 2 diabetes mellitus with hyperglycemia, without long-term current use of insulin (HCC)    Leukocytosis    Macrocytosis    Lumbar spondylosis    Closed wedge compression fracture of L3 vertebra with routine healing    Tricompartment osteoarthritis of left knee    PMB (postmenopausal bleeding)    Morbid obesity (HCC)    Restrictive lung disease    Mass of multiple  sites of right breast    Tobacco use         Plan:  Preoperative workup as follows: PAT, CBC W/DIFF, BMP, HGB A1C (EKG 5/6/24 in Epic)  2.   Scheduled for: HYSTEROSCOPY WITH DILATION AND CURETTAGE on 6/17/24.    SIGNATURE: NILSON Perez - CNP  DATE: June 12, 2024

## 2024-06-12 NOTE — TELEPHONE ENCOUNTER
Patient went in for PAT today and told them that she took her Ozempic yesterday. Ozempic must be stopped atleast 7 days in advance of day of surgery. Stated surgery needs to be cancelled.

## 2024-06-13 ENCOUNTER — PREP FOR PROCEDURE (OUTPATIENT)
Dept: BARIATRICS/WEIGHT MGMT | Age: 54
End: 2024-06-13

## 2024-06-13 ENCOUNTER — TELEPHONE (OUTPATIENT)
Dept: FAMILY MEDICINE CLINIC | Age: 54
End: 2024-06-13

## 2024-06-13 ENCOUNTER — TELEPHONE (OUTPATIENT)
Dept: OBGYN CLINIC | Age: 54
End: 2024-06-13

## 2024-06-13 ENCOUNTER — OFFICE VISIT (OUTPATIENT)
Dept: BARIATRICS/WEIGHT MGMT | Age: 54
End: 2024-06-13
Payer: COMMERCIAL

## 2024-06-13 VITALS
WEIGHT: 285.4 LBS | DIASTOLIC BLOOD PRESSURE: 80 MMHG | OXYGEN SATURATION: 97 % | HEART RATE: 81 BPM | HEIGHT: 60 IN | SYSTOLIC BLOOD PRESSURE: 110 MMHG | BODY MASS INDEX: 56.03 KG/M2

## 2024-06-13 DIAGNOSIS — E66.01 MORBID OBESITY (HCC): ICD-10-CM

## 2024-06-13 DIAGNOSIS — G47.33 OSA (OBSTRUCTIVE SLEEP APNEA): Primary | ICD-10-CM

## 2024-06-13 DIAGNOSIS — G47.33 OSA (OBSTRUCTIVE SLEEP APNEA): ICD-10-CM

## 2024-06-13 DIAGNOSIS — E11.65 TYPE 2 DIABETES MELLITUS WITH HYPERGLYCEMIA, WITHOUT LONG-TERM CURRENT USE OF INSULIN (HCC): Primary | ICD-10-CM

## 2024-06-13 DIAGNOSIS — Z72.0 TOBACCO USE: ICD-10-CM

## 2024-06-13 PROCEDURE — 3079F DIAST BP 80-89 MM HG: CPT | Performed by: SURGERY

## 2024-06-13 PROCEDURE — 3051F HG A1C>EQUAL 7.0%<8.0%: CPT | Performed by: SURGERY

## 2024-06-13 PROCEDURE — 99205 OFFICE O/P NEW HI 60 MIN: CPT | Performed by: SURGERY

## 2024-06-13 PROCEDURE — 3074F SYST BP LT 130 MM HG: CPT | Performed by: SURGERY

## 2024-06-13 RX ORDER — POLYETHYLENE GLYCOL 3350, SODIUM SULFATE ANHYDROUS, SODIUM BICARBONATE, SODIUM CHLORIDE, POTASSIUM CHLORIDE 236; 22.74; 6.74; 5.86; 2.97 G/4L; G/4L; G/4L; G/4L; G/4L
4000 POWDER, FOR SOLUTION ORAL ONCE
Qty: 4000 ML | Refills: 0 | Status: SHIPPED | OUTPATIENT
Start: 2024-06-13 | End: 2024-06-13

## 2024-06-13 RX ORDER — TOPIRAMATE 50 MG/1
50 TABLET, FILM COATED ORAL 2 TIMES DAILY
Qty: 60 TABLET | Refills: 3 | Status: SHIPPED | OUTPATIENT
Start: 2024-06-13

## 2024-06-13 NOTE — TELEPHONE ENCOUNTER
Patient aware. Patient asked for sleep study number to call to schedule .   Patient asked that I let PCP know that she had an appt today with weight management and  she is scheduled for a colonoscopy and endoscope for July 10.     Patient is to stop taking her ozempic three weeks prior to procedure . Patient stated she will stop ozempic on June 25 due to a rescheduled procedure with Dr Zoltan Booker .  Patient stated she will be taking topiramate , Peg 3350 and empagliflozin .    Patient stated she has an appt with Dr Jackson July 31

## 2024-06-13 NOTE — PATIENT INSTRUCTIONS
Make the following appointments:    Dietician, Candice Rodriguez.  Psychologist, Dr. Michel Barry.  Schedule screening upper endoscopy with Dr. Ahn. No aspirin, ibuprofen or other non-steroidal anti-inflammatory drugs (NSAIDs) 7 days prior to surgery and endoscopy    No food or drink six hours prior to surgery.    Follow up with your Sleep Medicine referral for Sleep Apnea assessment and treatment.      If you have [facility] insurance and do not want to wait 6 months for surgery given that there is no scientific data to support such a delay, call [phone] to switch to a [employer] medicaid plan.    THE BIG FOUR GUIDELINES:  1.  Count calories!  People who count calories eat less.  Use the daily plate or other apps for your smart phone or computer.  The more you count the more of an expert you will become and will get easier and easier.  If you are trying to lose weight and exercising a lot, keep calories to around the thousand to 1200 a day.  If you are not exercising consistently try to limit them to around 800 a day.    2.  Separate liquids and solids.  Wait 30 minutes to an hour after you eat before drinking liquids.  This will reduce the total amount of food you eat in the meal.    3.  Exercise!  You need up to 5 hours a week with a combination of cardio and resistance or weight training in order to keep excess body fat off.    4.  Sleep!   Try to get 7 or more hours of sleep a night.  If you have obstructive sleep apnea definitely use your CPAP machine.    THE RULE OF 20'S:  For every meal, chew each bite 20 seconds.  Then put the fork down and wait 20 seconds after you swallow before picking up the fork again.  Each meal should take at least 20 minutes so your brain can register that you are full.      STOP Ozempic 2 weeks prior to endoscopy.

## 2024-06-13 NOTE — TELEPHONE ENCOUNTER
Please advise patient that recent sleep study returned positive for mild sleep apnea.  With her presenting symptoms I have recommended she follow through with CPAP titration study to determine settings of CPAP machine needed to manage her sleep apnea.  Patient should also discuss further management with pulmonology/sleep medicine office, patient has a visit scheduled to establish care on 07/31/2024.    Follow-up CPAP titration study order has been left in her chart, please help arrange this follow-up testing.

## 2024-06-13 NOTE — PROGRESS NOTES
bariatric dietitian and to our bariatric psychologist.  We will need a preoperative history and physical within 30 days of surgery.  We will also refer to Sleep Medicine to determine if there is sleep apnea and to begin treatment if present.    More than 60 minutes were spent in face-to-face interaction with patient majority of which were spent in care coordination of the above.  Lakshmi Rebolledo   has expressed interest in laparoscopic or robotic Joelle en Y gastric bypass       Mitesh Ahn MD, FASMBS, FACS, MBSAQIP Verified Surgeon  LC-USN-RET, Diplomate of The American Board of Surgery

## 2024-06-13 NOTE — TELEPHONE ENCOUNTER
Left message for to call the office to reschedule surgery to another date. Must be off Ozempic x 7 days

## 2024-06-14 PROBLEM — G47.10 HYPERSOMNIA: Status: ACTIVE | Noted: 2024-06-14

## 2024-06-18 ENCOUNTER — PREP FOR PROCEDURE (OUTPATIENT)
Dept: NEUROSURGERY | Age: 54
End: 2024-06-18

## 2024-06-24 ENCOUNTER — OFFICE VISIT (OUTPATIENT)
Dept: BARIATRICS/WEIGHT MGMT | Age: 54
End: 2024-06-24
Payer: COMMERCIAL

## 2024-06-24 ENCOUNTER — TELEPHONE (OUTPATIENT)
Dept: CARDIOLOGY CLINIC | Age: 54
End: 2024-06-24

## 2024-06-24 VITALS — WEIGHT: 285 LBS | BODY MASS INDEX: 55.95 KG/M2 | HEIGHT: 60 IN

## 2024-06-24 DIAGNOSIS — Z71.3 DIETARY COUNSELING AND SURVEILLANCE: ICD-10-CM

## 2024-06-24 DIAGNOSIS — E11.65 TYPE 2 DIABETES MELLITUS WITH HYPERGLYCEMIA, WITHOUT LONG-TERM CURRENT USE OF INSULIN (HCC): Primary | ICD-10-CM

## 2024-06-24 DIAGNOSIS — E66.01 MORBID OBESITY (HCC): ICD-10-CM

## 2024-06-24 PROCEDURE — 97802 MEDICAL NUTRITION INDIV IN: CPT | Performed by: DIETITIAN, REGISTERED

## 2024-06-24 PROCEDURE — 3051F HG A1C>EQUAL 7.0%<8.0%: CPT | Performed by: DIETITIAN, REGISTERED

## 2024-06-24 NOTE — PROGRESS NOTES
[facility]  Initial Nutrition Consultation    Patient is a 54 y.o. female seen for initial Medical Nutrition Therapy visit for  Joelle en Y gastric bypass.   Visit number:  1 out of 6    6/24/2024  Height:   Ht Readings from Last 1 Encounters:   06/12/24 1.524 m (5')     Weight:   Wt Readings from Last 1 Encounters:   06/12/24 130.5 kg (287 lb 9.6 oz)     BMI:   BMI Readings from Last 1 Encounters:   06/12/24 56.17 kg/m²     EBW: 157 lbs  Preoperative weight loss goal: 28 lbs (10%)    Weight History:   Wt Readings from Last 3 Encounters:   06/12/24 130.5 kg (287 lb 9.6 oz)   06/13/24 129.5 kg (285 lb 6.4 oz)   06/06/24 135.2 kg (298 lb)         Patient's highest adult weight was 299 lbs at age 54.       Patient has participated in the following weight loss programs self directed low calorie diet  Patient has not participated in meal replacement/liquid diets.    Patient has participated in weight loss medications.    Patient is not lactose intolerant.  Patient does not have Evangelical/cultural food concerns.  Patient does not have food allergies.    Patient dines out to a sit down restaurant 0 times per week.  Patient has fast food or picks up carry out 0 times per week (quit going to fast food a few months ago when diagnosed with Diabetes.    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 States her life has been very stressful, uses food and cigarettes to cope. Patient has not had insurance until this past March and has not seen a doctor in years.  Patient has made many positive dietary changes since March (already lost 15 lbs) and reduced cigarettes from 2 packs/day to 5 cigarettes/day. Set quit date of July 4th.    24 hour recall/food frequency chart:  Breakfast:  1 egg, 1 toast, 1-2 sausage links  Snack:  none  Lunch: Italian sausage , 1/2 cup low fat cottage

## 2024-06-24 NOTE — PATIENT INSTRUCTIONS
Increase meal frequency to 4-6 /day including protein first at each  Practice  liquids from solids  Practice eating slower, follow rule of 20  Read \"Patient Guide to Bariatric Surgery\" before next visit

## 2024-06-26 ENCOUNTER — PATIENT MESSAGE (OUTPATIENT)
Dept: FAMILY MEDICINE CLINIC | Age: 54
End: 2024-06-26

## 2024-06-26 NOTE — TELEPHONE ENCOUNTER
From: Lakshmi Rebolledo  To: Dr. Mathieu Silvestre  Sent: 6/26/2024 9:34 AM EDT  Subject: sour tummy    since i upped my Ozempic dosage and stared taking some other meds i am now getting a sour stomach it is very bothersome and you told should i start to experience this you could get me some anti nausea meds please

## 2024-06-28 ENCOUNTER — OFFICE VISIT (OUTPATIENT)
Dept: FAMILY MEDICINE CLINIC | Age: 54
End: 2024-06-28
Payer: COMMERCIAL

## 2024-06-28 ENCOUNTER — TELEPHONE (OUTPATIENT)
Dept: FAMILY MEDICINE CLINIC | Age: 54
End: 2024-06-28

## 2024-06-28 VITALS
HEART RATE: 90 BPM | SYSTOLIC BLOOD PRESSURE: 106 MMHG | BODY MASS INDEX: 55.22 KG/M2 | DIASTOLIC BLOOD PRESSURE: 70 MMHG | OXYGEN SATURATION: 96 % | TEMPERATURE: 98 F | WEIGHT: 282.2 LBS

## 2024-06-28 DIAGNOSIS — R11.0 NAUSEA: ICD-10-CM

## 2024-06-28 DIAGNOSIS — R10.13 EPIGASTRIC DISCOMFORT: Primary | ICD-10-CM

## 2024-06-28 DIAGNOSIS — R68.81 EARLY SATIETY: ICD-10-CM

## 2024-06-28 DIAGNOSIS — R12 HEARTBURN: ICD-10-CM

## 2024-06-28 PROCEDURE — 3078F DIAST BP <80 MM HG: CPT | Performed by: FAMILY MEDICINE

## 2024-06-28 PROCEDURE — 99213 OFFICE O/P EST LOW 20 MIN: CPT | Performed by: FAMILY MEDICINE

## 2024-06-28 PROCEDURE — 3074F SYST BP LT 130 MM HG: CPT | Performed by: FAMILY MEDICINE

## 2024-06-28 RX ORDER — PANTOPRAZOLE SODIUM 40 MG/1
40 TABLET, DELAYED RELEASE ORAL
Qty: 30 TABLET | Refills: 2 | Status: SHIPPED | OUTPATIENT
Start: 2024-06-28

## 2024-06-28 ASSESSMENT — ENCOUNTER SYMPTOMS
NAUSEA: 1
ANAL BLEEDING: 0
HEMATOCHEZIA: 0
VOMITING: 0
RECTAL PAIN: 0
DIARRHEA: 0
CONSTIPATION: 0
ABDOMINAL PAIN: 1
BLOOD IN STOOL: 0
ABDOMINAL DISTENTION: 1

## 2024-06-28 NOTE — TELEPHONE ENCOUNTER
Patient needs to call insurance and find out what medication is the preferred alternative to the prescription I gave her today. According to our EMR pantoprazole, omeprazole, lansoprazole, and esomeprazole should all be covered.     Instruct her to call the office back with this information once she finds out.

## 2024-06-28 NOTE — PROGRESS NOTES
Right lower leg: No edema.      Left lower leg: No edema.   Skin:     Findings: No rash.   Neurological:      Mental Status: She is alert and oriented to person, place, and time.   Psychiatric:         Mood and Affect: Mood normal.         Behavior: Behavior normal.         Thought Content: Thought content normal.         Ortho Exam (If Applicable)                   An electronic signature was used to authenticate this note.     PEGGY COPE MD

## 2024-06-28 NOTE — TELEPHONE ENCOUNTER
Pt called bc, Insurance will not cover protonix or generic medication prescribed today. Pt doesn't have the capabilities to pay out of pocket. Is there a different medication she can take. Please advise.

## 2024-07-01 ENCOUNTER — SCHEDULED TELEPHONE ENCOUNTER (OUTPATIENT)
Dept: CARDIOLOGY CLINIC | Age: 54
End: 2024-07-01
Payer: COMMERCIAL

## 2024-07-01 DIAGNOSIS — I10 HYPERTENSION, UNSPECIFIED TYPE: ICD-10-CM

## 2024-07-01 DIAGNOSIS — E11.65 TYPE 2 DIABETES MELLITUS WITH HYPERGLYCEMIA, WITHOUT LONG-TERM CURRENT USE OF INSULIN (HCC): ICD-10-CM

## 2024-07-01 DIAGNOSIS — R07.9 CHEST PAIN, UNSPECIFIED TYPE: ICD-10-CM

## 2024-07-01 DIAGNOSIS — R06.09 DOE (DYSPNEA ON EXERTION): Primary | ICD-10-CM

## 2024-07-01 DIAGNOSIS — E78.5 DYSLIPIDEMIA: ICD-10-CM

## 2024-07-01 DIAGNOSIS — Z01.818 PRE-OPERATIVE CLEARANCE: ICD-10-CM

## 2024-07-01 PROCEDURE — 3051F HG A1C>EQUAL 7.0%<8.0%: CPT | Performed by: INTERNAL MEDICINE

## 2024-07-01 PROCEDURE — 99214 OFFICE O/P EST MOD 30 MIN: CPT | Performed by: INTERNAL MEDICINE

## 2024-07-01 ASSESSMENT — ENCOUNTER SYMPTOMS
GASTROINTESTINAL NEGATIVE: 1
COUGH: 0
BLOOD IN STOOL: 0
NAUSEA: 0
EYES NEGATIVE: 1
CHEST TIGHTNESS: 1
WHEEZING: 0
STRIDOR: 0
SHORTNESS OF BREATH: 1

## 2024-07-01 NOTE — PROGRESS NOTES
light-headedness.   Hematological: Negative.    Psychiatric/Behavioral: Negative.           Physical Examination:    LMP 01/27/2015    Physical Exam   Constitutional: She appears healthy. No distress.   HENT:   Normal cephalic and Atraumatic   Eyes: Pupils are equal, round, and reactive to light.   Neck: Thyroid normal. No JVD present. No neck adenopathy. No thyromegaly present.   Cardiovascular: Normal rate, regular rhythm, normal heart sounds, intact distal pulses and normal pulses.   Pulmonary/Chest: Effort normal and breath sounds normal. She has no wheezes. She has no rales. She exhibits no tenderness.   Abdominal: Soft. Bowel sounds are normal. There is no abdominal tenderness.   Musculoskeletal:         General: No tenderness or edema. Normal range of motion.      Cervical back: Normal range of motion and neck supple.   Neurological: She is alert and oriented to person, place, and time.   Skin: Skin is warm. No cyanosis. Nails show no clubbing.       LABS:  CBC:   Lab Results   Component Value Date/Time    WBC 14.7 06/12/2024 11:50 AM    RBC 5.41 06/12/2024 11:50 AM    HGB 17.7 06/12/2024 11:50 AM    HCT 52.0 06/12/2024 11:50 AM    MCV 96.1 06/12/2024 11:50 AM    MCH 32.7 06/12/2024 11:50 AM    MCHC 34.0 06/12/2024 11:50 AM    RDW 12.1 06/12/2024 11:50 AM     06/12/2024 11:50 AM    MPV 8.1 11/18/2014 04:27 PM     Lipids:  Lab Results   Component Value Date    CHOL 173 03/29/2024     Lab Results   Component Value Date    TRIG 144 03/29/2024     Lab Results   Component Value Date    HDL 42 03/29/2024     No components found for: \"LDLCHOLESTEROL\", \"LDLCALC\"  No results found for: \"VLDL\"  No results found for: \"CHOLHDLRATIO\"  CMP:    Lab Results   Component Value Date/Time     06/12/2024 11:49 AM    K 4.2 06/12/2024 11:49 AM    K 4.4 02/28/2022 05:43 AM     06/12/2024 11:49 AM    CO2 24 06/12/2024 11:49 AM    BUN 16 06/12/2024 11:49 AM    CREATININE 0.83 06/12/2024 11:49 AM    GFRAA >60.0

## 2024-07-09 ENCOUNTER — NURSE ONLY (OUTPATIENT)
Dept: BARIATRICS/WEIGHT MGMT | Age: 54
End: 2024-07-09

## 2024-07-09 ENCOUNTER — ANESTHESIA EVENT (OUTPATIENT)
Dept: ENDOSCOPY | Age: 54
End: 2024-07-09
Payer: COMMERCIAL

## 2024-07-09 DIAGNOSIS — E66.01 MORBID OBESITY (HCC): Primary | ICD-10-CM

## 2024-07-09 ASSESSMENT — ENCOUNTER SYMPTOMS: SHORTNESS OF BREATH: 1

## 2024-07-09 NOTE — ANESTHESIA PRE PROCEDURE
[address] [facility]                                 PROCEDURE NOTE    PATIENT NAME: Av Arreola                  :        1955  MED REC NO:   [id number]                          ROOM:  ACCOUNT NO:   [de-identified]                           ADMIT DATE: 2021  PROVIDER:     Alanis Romero MD    DATE OF PROCEDURE:  2021    PREOPERATIVE DIAGNOSES:  Postmenopausal bleeding and endometrial polyp. POSTOPERATIVE DIAGNOSES:  Postmenopausal bleeding and endometrial polyp. OPERATION PERFORMED:  Hysteroscopy and D and C.    SURGEON:  Alanis Romero MD    ANESTHESIA:  General.    COMPLICATIONS:  None. EBL:  Minimal.    INDICATIONS:  This is a 43-year-old white female who has a history of  postmenopausal bleeding. On ultrasound, she was found to have a 0.8 cm  mass within the endometrium, suspected a polyp. The risks, benefits,  indications and alternatives of a D and C and hysteroscopy for removal  of the polyp were discussed with the patient and all questions were  answered. OPERATIVE PROCEDURE:  The patient was taken to the operating room with  IV running. General anesthesia was obtained. The patient was prepped  and draped in the usual sterile fashion in the dorsal lithotomy  position. The cervix was visualized using Alegria retractors and the  anterior lip was grasped with a single-tooth tenaculum. The cervix was  dilated using the Krishna dilators. The hysteroscope was introduced into  the cavity and there was noted to be an approximately 8-mm polyp,  originating from the posterior wall. The polyp forceps were used to  remove the polyp and a curettage was performed, although, the  endometrium was quite atrophic. The hysteroscope was then again  introduced just to ensure that all the polyp was removed and it was. The other instruments were all removed from the vagina.   The Take 1 tablet by mouth 3 times daily as needed (spasms)  Patient not taking: Reported on 6/28/2024 5/1/24   Ta Guzman DO   meloxicam (MOBIC) 15 MG tablet Take 1 tablet by mouth daily  Patient not taking: Reported on 6/28/2024 5/1/24   Ta Guzman DO   losartan (COZAAR) 25 MG tablet Take 1 tablet by mouth daily 4/28/24   Mathieu Silvestre MD   metFORMIN (GLUCOPHAGE-XR) 500 MG extended release tablet Take 1 tablet by mouth daily (with breakfast) 3/29/24   Mathieu Silvestre MD   atorvastatin (LIPITOR) 10 MG tablet Take 1 tablet by mouth daily 3/29/24   Mathieu Silvestre MD       Current medications:    No current facility-administered medications for this encounter.     Current Outpatient Medications   Medication Sig Dispense Refill    pantoprazole (PROTONIX) 40 MG tablet Take 1 tablet by mouth every morning (before breakfast) 30 tablet 2    Prenatal Vit-Fe Fumarate-FA (PRENATAL VITAMINS PO) Take by mouth      topiramate (TOPAMAX) 50 MG tablet Take 1 tablet by mouth 2 times daily 60 tablet 3    empagliflozin (JARDIANCE) 10 MG tablet Take 1 tablet by mouth daily 90 tablet 1    varenicline (CHANTIX CONTINUING MONTH IRVING) 1 MG tablet Take 1 tablet by mouth 2 times daily 60 tablet 2    OZEMPIC, 0.25 OR 0.5 MG/DOSE, 2 MG/3ML SOPN INJECT 0.25MG SUBCUTANEOUSLY ONCE WEEKLY AT 5 P.M. FOR 28 DAYS. THEN INCREASE TO 0.5MG ONCE WEEKLY AT 5 P.M. FOR 28 DAYS      Alcohol Swabs PADS DX: diabetes mellitus. Test 1 time(s) daily - Ok to substitute per insurance (1 each = 1 box) 100 each 5    blood glucose monitor kit and supplies DX: diabetes mellitus. Test 1 time(s) daily - Ok to substitute per insurance 1 kit 0    blood glucose monitor strips DX: diabetes mellitus. Use 1 time(s) daily - Ok to substitute per insurance 100 strip 5    Lancets MISC DX: diabetes mellitus. Use 1 time(s) daily - Ok to substitute per insurance (1 each = 1 box) 100 each 5    DULoxetine (CYMBALTA) 30 MG extended release capsule Take 1 capsule by  patient  tolerated the procedure well. All counts were correct, and the patient  was taken to the recovery room in stable condition.         Jt Cervantes MD    D: 12/17/2021 12:43:53       T: 12/17/2021 15:18:27     [id number]  Job#: [id number]     Doc#: [id number]    CC:  Mojgan Billings MD

## 2024-07-10 ENCOUNTER — HOSPITAL ENCOUNTER (OUTPATIENT)
Age: 54
Setting detail: OUTPATIENT SURGERY
Discharge: HOME | End: 2024-07-10
Attending: SURGERY | Admitting: SURGERY
Payer: COMMERCIAL

## 2024-07-10 ENCOUNTER — ANESTHESIA (OUTPATIENT)
Dept: ENDOSCOPY | Age: 54
End: 2024-07-10
Payer: COMMERCIAL

## 2024-07-10 VITALS
RESPIRATION RATE: 18 BRPM | HEIGHT: 59 IN | BODY MASS INDEX: 57.45 KG/M2 | OXYGEN SATURATION: 97 % | TEMPERATURE: 96.8 F | WEIGHT: 285 LBS | SYSTOLIC BLOOD PRESSURE: 155 MMHG | DIASTOLIC BLOOD PRESSURE: 95 MMHG | HEART RATE: 81 BPM

## 2024-07-10 DIAGNOSIS — E11.65 TYPE 2 DIABETES MELLITUS WITH HYPERGLYCEMIA, WITHOUT LONG-TERM CURRENT USE OF INSULIN (HCC): ICD-10-CM

## 2024-07-10 DIAGNOSIS — E66.01 MORBID OBESITY (HCC): ICD-10-CM

## 2024-07-10 PROBLEM — K20.90 ESOPHAGITIS DETERMINED BY ENDOSCOPY: Status: ACTIVE | Noted: 2024-07-10

## 2024-07-10 PROBLEM — Z87.19 HISTORY OF RECTAL POLYPS: Chronic | Status: ACTIVE | Noted: 2024-07-10

## 2024-07-10 PROBLEM — Z86.010 HISTORY OF COLON POLYPS: Chronic | Status: ACTIVE | Noted: 2024-07-10

## 2024-07-10 PROBLEM — Z86.0100 HISTORY OF COLON POLYPS: Chronic | Status: ACTIVE | Noted: 2024-07-10

## 2024-07-10 LAB
GLUCOSE BLD-MCNC: 114 MG/DL (ref 70–99)
PERFORMED ON: ABNORMAL

## 2024-07-10 PROCEDURE — 7100000010 HC PHASE II RECOVERY - FIRST 15 MIN: Performed by: SURGERY

## 2024-07-10 PROCEDURE — 88342 IMHCHEM/IMCYTCHM 1ST ANTB: CPT

## 2024-07-10 PROCEDURE — 3700000001 HC ADD 15 MINUTES (ANESTHESIA): Performed by: SURGERY

## 2024-07-10 PROCEDURE — 99024 POSTOP FOLLOW-UP VISIT: CPT | Performed by: SURGERY

## 2024-07-10 PROCEDURE — 3609010300 HC COLONOSCOPY W/BIOPSY SINGLE/MULTIPLE: Performed by: SURGERY

## 2024-07-10 PROCEDURE — 2580000003 HC RX 258: Performed by: SURGERY

## 2024-07-10 PROCEDURE — 45385 COLONOSCOPY W/LESION REMOVAL: CPT | Performed by: SURGERY

## 2024-07-10 PROCEDURE — 2709999900 HC NON-CHARGEABLE SUPPLY: Performed by: SURGERY

## 2024-07-10 PROCEDURE — 3700000000 HC ANESTHESIA ATTENDED CARE: Performed by: SURGERY

## 2024-07-10 PROCEDURE — 6360000002 HC RX W HCPCS: Performed by: NURSE ANESTHETIST, CERTIFIED REGISTERED

## 2024-07-10 PROCEDURE — 43239 EGD BIOPSY SINGLE/MULTIPLE: CPT | Performed by: SURGERY

## 2024-07-10 PROCEDURE — 7100000011 HC PHASE II RECOVERY - ADDTL 15 MIN: Performed by: SURGERY

## 2024-07-10 PROCEDURE — 3609017100 HC EGD: Performed by: SURGERY

## 2024-07-10 PROCEDURE — 2580000003 HC RX 258

## 2024-07-10 PROCEDURE — 88305 TISSUE EXAM BY PATHOLOGIST: CPT

## 2024-07-10 PROCEDURE — 45380 COLONOSCOPY AND BIOPSY: CPT | Performed by: SURGERY

## 2024-07-10 RX ORDER — SODIUM CHLORIDE 9 MG/ML
INJECTION, SOLUTION INTRAVENOUS CONTINUOUS
Status: DISCONTINUED | OUTPATIENT
Start: 2024-07-10 | End: 2024-07-10 | Stop reason: HOSPADM

## 2024-07-10 RX ORDER — SODIUM CHLORIDE 9 MG/ML
INJECTION, SOLUTION INTRAVENOUS CONTINUOUS
Status: CANCELLED | OUTPATIENT
Start: 2024-07-10

## 2024-07-10 RX ORDER — SODIUM CHLORIDE 9 MG/ML
INJECTION, SOLUTION INTRAVENOUS PRN
Status: CANCELLED | OUTPATIENT
Start: 2024-07-10

## 2024-07-10 RX ORDER — SODIUM CHLORIDE 0.9 % (FLUSH) 0.9 %
5-40 SYRINGE (ML) INJECTION EVERY 12 HOURS SCHEDULED
Status: DISCONTINUED | OUTPATIENT
Start: 2024-07-10 | End: 2024-07-10 | Stop reason: HOSPADM

## 2024-07-10 RX ORDER — SODIUM CHLORIDE 0.9 % (FLUSH) 0.9 %
5-40 SYRINGE (ML) INJECTION PRN
Status: DISCONTINUED | OUTPATIENT
Start: 2024-07-10 | End: 2024-07-10 | Stop reason: HOSPADM

## 2024-07-10 RX ORDER — SODIUM CHLORIDE 9 MG/ML
INJECTION, SOLUTION INTRAVENOUS
Status: COMPLETED
Start: 2024-07-10 | End: 2024-07-10

## 2024-07-10 RX ORDER — SODIUM CHLORIDE 0.9 % (FLUSH) 0.9 %
5-40 SYRINGE (ML) INJECTION EVERY 12 HOURS SCHEDULED
Status: CANCELLED | OUTPATIENT
Start: 2024-07-10

## 2024-07-10 RX ORDER — SODIUM CHLORIDE 9 MG/ML
INJECTION, SOLUTION INTRAVENOUS PRN
Status: DISCONTINUED | OUTPATIENT
Start: 2024-07-10 | End: 2024-07-10 | Stop reason: HOSPADM

## 2024-07-10 RX ORDER — SODIUM CHLORIDE 0.9 % (FLUSH) 0.9 %
5-40 SYRINGE (ML) INJECTION PRN
Status: CANCELLED | OUTPATIENT
Start: 2024-07-10

## 2024-07-10 RX ORDER — PROPOFOL 10 MG/ML
INJECTION, EMULSION INTRAVENOUS PRN
Status: DISCONTINUED | OUTPATIENT
Start: 2024-07-10 | End: 2024-07-10 | Stop reason: SDUPTHER

## 2024-07-10 RX ADMIN — PROPOFOL 50 MG: 10 INJECTION, EMULSION INTRAVENOUS at 12:14

## 2024-07-10 RX ADMIN — SODIUM CHLORIDE: 9 INJECTION, SOLUTION INTRAVENOUS at 11:55

## 2024-07-10 RX ADMIN — PROPOFOL 50 MG: 10 INJECTION, EMULSION INTRAVENOUS at 12:02

## 2024-07-10 RX ADMIN — PROPOFOL 50 MG: 10 INJECTION, EMULSION INTRAVENOUS at 12:19

## 2024-07-10 RX ADMIN — PROPOFOL 100 MG: 10 INJECTION, EMULSION INTRAVENOUS at 12:00

## 2024-07-10 RX ADMIN — PROPOFOL 50 MG: 10 INJECTION, EMULSION INTRAVENOUS at 12:30

## 2024-07-10 RX ADMIN — PROPOFOL 50 MG: 10 INJECTION, EMULSION INTRAVENOUS at 12:24

## 2024-07-10 RX ADMIN — PROPOFOL 50 MG: 10 INJECTION, EMULSION INTRAVENOUS at 12:34

## 2024-07-10 RX ADMIN — PROPOFOL 50 MG: 10 INJECTION, EMULSION INTRAVENOUS at 12:13

## 2024-07-10 RX ADMIN — PROPOFOL 50 MG: 10 INJECTION, EMULSION INTRAVENOUS at 12:09

## 2024-07-10 RX ADMIN — PROPOFOL 50 MG: 10 INJECTION, EMULSION INTRAVENOUS at 12:07

## 2024-07-10 RX ADMIN — PROPOFOL 50 MG: 10 INJECTION, EMULSION INTRAVENOUS at 12:04

## 2024-07-10 RX ADMIN — PROPOFOL 50 MG: 10 INJECTION, EMULSION INTRAVENOUS at 12:15

## 2024-07-10 ASSESSMENT — PAIN SCALES - GENERAL
PAINLEVEL_OUTOF10: 4
PAINLEVEL_OUTOF10: 5

## 2024-07-10 ASSESSMENT — PAIN - FUNCTIONAL ASSESSMENT: PAIN_FUNCTIONAL_ASSESSMENT: NONE - DENIES PAIN

## 2024-07-10 NOTE — H&P
History and Physical    Patient Name:  :  Hospital Day:   Lakshmi Rebolledo 1970  LOS: 0 days      Date of Service: 7/10/2024    Subjective:      History of Present Illness:    Lakshmi Rebolledo  is a 54 y.o. female referred by Dr. Flores for type II diabetes and morbid obesity.  Lakshmi Rebolledo reports multiple episodes of weight loss and regain after multiple diet and exercise programs. She is smoking cigarettes daily.  No alcohol.  She recently had a sleep study done.  She has never had a screening colonoscopy.         Past Medical History:   Diagnosis Date    Abnormal Pap smear of cervix     Cerebral artery occlusion with cerebral infarction ([facility])     CHF (congestive heart failure) ([facility]) ?    Chlamydia     Closed wedge compression fracture of L3 vertebra with routine healing 2024    COPD (chronic obstructive pulmonary disease) ([facility])     COPD (chronic obstructive pulmonary disease) ([facility]) 2022    Depression     Drug effect     plastic  tape    Heart disease 2014    History of abnormal cervical Pap smear 2024    History of blood transfusion     post delivery 32 yrs ago    Hyperlipidemia     Hypertension 1990 cont    Lumbar spondylosis 2024    Mass of multiple sites of right breast 2024    Menopausal symptoms fin2022    MI (myocardial infarction) ([facility])     Migraine     Morbid obesity with BMI of 60.0-69.9, adult ([facility]) 2024    MS (multiple sclerosis) ([facility])     Obesity 2024    Pre-diabetes 2024     labor     Restless legs syndrome 20 plus years never dianosed    Restrictive lung disease 2024    Rh incompatibility 1992    Sleep apnea possible    TB (tuberculosis)     Thrombocytopenia ([facility]) 2024    Tricompartment osteoarthritis of left knee 2024    Type 2 diabetes mellitus without complication ([facility])     Past Surgical History:   Procedure Laterality Date     SECTION  1992     SECTION   Reviewed:  [unfilled]     Diagnosis:   Morbid Obesity  Assessment/Plan:            Lakshmi Rebolledo  is a 54 y.o. female with morbid obesity.   who reports multiple episodes of repeat weight-loss and re-gain with various programmatic weight loss programs such as Weight Watchers, Tierra Dhiraj, and TOPS.     We discussed the risks and benefits of screening upper endoscopy to assess the degree of esophagitis, possible Vargas's esophagitis,dysplasia, gastrointestinal stromal tumors, or H. Pylori infection prior to surgery.    We discussed that there are risk for injury to the oropharynx, esophagus, stomach, and duodenum which include perforation and need for operative repair.   The patient consents to the endoscopic procedure.    We discussed the need for chronic vitamin and mineral supplementation after surgery to avoid deficiencies in vitamins B1 and/or B12 (which could result in neurologic disorders) and/or iron or calcium deficiencies.    We discussed that nicotine is contraindicated after bariatric surgery due to the risk of marginal ulceration which can result in perforation, stricture, and/or bleeding.       All patients of child bearing age and/or ability are advised to avoid pregnancy for at least 2 years after Bariatric surgery.  Patients with PCOS are advised that oral contraceptives are often not effective in the first 2 years after Bariatric surgery.      More than 35 minutes were spent in face-to-face interaction with patient majority of which were spent in care coordination of the above.  Lakshmi Rebolledo   has expressed interest in laparoscopic or robotic Joelle en Y gastric bypass       Mitesh Ahn MD, FASMBS, FACS, MBSAQIP Verified Surgeon  [id number], Diplomate of The American Board of Surgery

## 2024-07-10 NOTE — ANESTHESIA POSTPROCEDURE EVALUATION
Department of Anesthesiology  Postprocedure Note    Patient: Lakshmi Rebolledo  MRN: [id number]  YOB: 1970  Date of evaluation: 7/10/2024    Procedure Summary       Date: 07/10/24 Room / Location: [facility] OR 03 / [facility]    Anesthesia Start: 1157 Anesthesia Stop: 1241    Procedures:       EGD ESOPHAGOGASTRODUODENOSCOPY WITH BIOPSIES      COLONOSCOPY BIOPSY WITH POLYPECTOMIES Diagnosis:       Type 2 diabetes mellitus with hyperglycemia, without long-term current use of insulin (HCC)      Morbid obesity (HCC)      (Type 2 diabetes mellitus with hyperglycemia, without long-term current use of insulin (HCC) [E11.65])      (Morbid obesity (HCC) [E66.01])    Surgeons: Mitesh Ahn MD Responsible Provider: Phyllis Sutton APRN - CRNA    Anesthesia Type: MAC ASA Status: 3            Anesthesia Type: No value filed.    Fabian Phase I: Fabian Score: 10    Fabian Phase II:      Anesthesia Post Evaluation    Patient location during evaluation: PACU  Patient participation: complete - patient cannot participate  Level of consciousness: awake  Pain score: 0  Airway patency: patent  Nausea & Vomiting: no vomiting and no nausea  Cardiovascular status: blood pressure returned to baseline  Respiratory status: acceptable  Hydration status: stable  Pain management: adequate        No notable events documented.

## 2024-07-15 ENCOUNTER — HOSPITAL ENCOUNTER (OUTPATIENT)
Age: 54
Setting detail: OUTPATIENT SURGERY
Discharge: HOME | End: 2024-07-15
Attending: OBSTETRICS & GYNECOLOGY | Admitting: OBSTETRICS & GYNECOLOGY
Payer: COMMERCIAL

## 2024-07-15 ENCOUNTER — ANESTHESIA EVENT (OUTPATIENT)
Dept: OPERATING ROOM | Age: 54
End: 2024-07-15
Payer: COMMERCIAL

## 2024-07-15 ENCOUNTER — ANESTHESIA (OUTPATIENT)
Dept: OPERATING ROOM | Age: 54
End: 2024-07-15
Payer: COMMERCIAL

## 2024-07-15 VITALS
HEIGHT: 60 IN | TEMPERATURE: 97.5 F | OXYGEN SATURATION: 97 % | RESPIRATION RATE: 16 BRPM | SYSTOLIC BLOOD PRESSURE: 148 MMHG | DIASTOLIC BLOOD PRESSURE: 67 MMHG | HEART RATE: 70 BPM | BODY MASS INDEX: 56.46 KG/M2 | WEIGHT: 287.6 LBS

## 2024-07-15 PROBLEM — R93.89 THICKENED ENDOMETRIUM: Status: ACTIVE | Noted: 2024-07-15

## 2024-07-15 PROBLEM — N81.6 FEMALE RECTOCELE WITH ENTEROCELE: Status: ACTIVE | Noted: 2024-07-15

## 2024-07-15 PROBLEM — K46.9 FEMALE RECTOCELE WITH ENTEROCELE: Status: ACTIVE | Noted: 2024-07-15

## 2024-07-15 LAB
GLUCOSE BLD-MCNC: 100 MG/DL (ref 70–99)
GLUCOSE BLD-MCNC: 140 MG/DL (ref 70–99)
HCG, URINE, POC: NEGATIVE
Lab: NORMAL
NEGATIVE QC PASS/FAIL: NORMAL
PERFORMED ON: ABNORMAL
PERFORMED ON: ABNORMAL
POSITIVE QC PASS/FAIL: NORMAL

## 2024-07-15 PROCEDURE — 6360000002 HC RX W HCPCS: Performed by: OBSTETRICS & GYNECOLOGY

## 2024-07-15 PROCEDURE — 2580000003 HC RX 258: Performed by: ANESTHESIOLOGY

## 2024-07-15 PROCEDURE — 6360000002 HC RX W HCPCS: Performed by: ANESTHESIOLOGY

## 2024-07-15 PROCEDURE — 3600000014 HC SURGERY LEVEL 4 ADDTL 15MIN: Performed by: OBSTETRICS & GYNECOLOGY

## 2024-07-15 PROCEDURE — 2500000003 HC RX 250 WO HCPCS: Performed by: ANESTHESIOLOGY

## 2024-07-15 PROCEDURE — 7100000000 HC PACU RECOVERY - FIRST 15 MIN: Performed by: OBSTETRICS & GYNECOLOGY

## 2024-07-15 PROCEDURE — 2709999900 HC NON-CHARGEABLE SUPPLY: Performed by: OBSTETRICS & GYNECOLOGY

## 2024-07-15 PROCEDURE — 7100000011 HC PHASE II RECOVERY - ADDTL 15 MIN: Performed by: OBSTETRICS & GYNECOLOGY

## 2024-07-15 PROCEDURE — 6360000002 HC RX W HCPCS: Performed by: NURSE ANESTHETIST, CERTIFIED REGISTERED

## 2024-07-15 PROCEDURE — 3600000004 HC SURGERY LEVEL 4 BASE: Performed by: OBSTETRICS & GYNECOLOGY

## 2024-07-15 PROCEDURE — 3700000000 HC ANESTHESIA ATTENDED CARE: Performed by: OBSTETRICS & GYNECOLOGY

## 2024-07-15 PROCEDURE — 7100000001 HC PACU RECOVERY - ADDTL 15 MIN: Performed by: OBSTETRICS & GYNECOLOGY

## 2024-07-15 PROCEDURE — 2580000003 HC RX 258: Performed by: OBSTETRICS & GYNECOLOGY

## 2024-07-15 PROCEDURE — 7100000010 HC PHASE II RECOVERY - FIRST 15 MIN: Performed by: OBSTETRICS & GYNECOLOGY

## 2024-07-15 PROCEDURE — 3700000001 HC ADD 15 MINUTES (ANESTHESIA): Performed by: OBSTETRICS & GYNECOLOGY

## 2024-07-15 RX ORDER — KETAMINE HYDROCHLORIDE 10 MG/ML
40 INJECTION, SOLUTION INTRAMUSCULAR; INTRAVENOUS ONCE
Status: COMPLETED | OUTPATIENT
Start: 2024-07-15 | End: 2024-07-15

## 2024-07-15 RX ORDER — SODIUM CHLORIDE 0.9 % (FLUSH) 0.9 %
5-40 SYRINGE (ML) INJECTION EVERY 12 HOURS SCHEDULED
Status: DISCONTINUED | OUTPATIENT
Start: 2024-07-15 | End: 2024-07-15 | Stop reason: HOSPADM

## 2024-07-15 RX ORDER — OXYCODONE HYDROCHLORIDE 5 MG/1
5 TABLET ORAL
Status: DISCONTINUED | OUTPATIENT
Start: 2024-07-15 | End: 2024-07-15 | Stop reason: HOSPADM

## 2024-07-15 RX ORDER — MIDAZOLAM HYDROCHLORIDE 1 MG/ML
INJECTION INTRAMUSCULAR; INTRAVENOUS PRN
Status: DISCONTINUED | OUTPATIENT
Start: 2024-07-15 | End: 2024-07-15 | Stop reason: SDUPTHER

## 2024-07-15 RX ORDER — SODIUM CHLORIDE 0.9 % (FLUSH) 0.9 %
5-40 SYRINGE (ML) INJECTION PRN
Status: DISCONTINUED | OUTPATIENT
Start: 2024-07-15 | End: 2024-07-15 | Stop reason: HOSPADM

## 2024-07-15 RX ORDER — PROPOFOL 10 MG/ML
INJECTION, EMULSION INTRAVENOUS CONTINUOUS PRN
Status: DISCONTINUED | OUTPATIENT
Start: 2024-07-15 | End: 2024-07-15 | Stop reason: SDUPTHER

## 2024-07-15 RX ORDER — METOCLOPRAMIDE HYDROCHLORIDE 5 MG/ML
10 INJECTION INTRAMUSCULAR; INTRAVENOUS
Status: DISCONTINUED | OUTPATIENT
Start: 2024-07-15 | End: 2024-07-15 | Stop reason: HOSPADM

## 2024-07-15 RX ORDER — FENTANYL CITRATE 50 UG/ML
INJECTION, SOLUTION INTRAMUSCULAR; INTRAVENOUS PRN
Status: DISCONTINUED | OUTPATIENT
Start: 2024-07-15 | End: 2024-07-15 | Stop reason: SDUPTHER

## 2024-07-15 RX ORDER — DIPHENHYDRAMINE HYDROCHLORIDE 50 MG/ML
12.5 INJECTION INTRAMUSCULAR; INTRAVENOUS
Status: DISCONTINUED | OUTPATIENT
Start: 2024-07-15 | End: 2024-07-15 | Stop reason: HOSPADM

## 2024-07-15 RX ORDER — NALOXONE HYDROCHLORIDE 0.4 MG/ML
INJECTION, SOLUTION INTRAMUSCULAR; INTRAVENOUS; SUBCUTANEOUS PRN
Status: DISCONTINUED | OUTPATIENT
Start: 2024-07-15 | End: 2024-07-15 | Stop reason: HOSPADM

## 2024-07-15 RX ORDER — SODIUM CHLORIDE 9 MG/ML
INJECTION, SOLUTION INTRAVENOUS PRN
Status: DISCONTINUED | OUTPATIENT
Start: 2024-07-15 | End: 2024-07-15 | Stop reason: HOSPADM

## 2024-07-15 RX ORDER — FENTANYL CITRATE 0.05 MG/ML
50 INJECTION, SOLUTION INTRAMUSCULAR; INTRAVENOUS EVERY 10 MIN PRN
Status: DISCONTINUED | OUTPATIENT
Start: 2024-07-15 | End: 2024-07-15 | Stop reason: HOSPADM

## 2024-07-15 RX ORDER — MEPERIDINE HYDROCHLORIDE 25 MG/ML
12.5 INJECTION INTRAMUSCULAR; INTRAVENOUS; SUBCUTANEOUS
Status: DISCONTINUED | OUTPATIENT
Start: 2024-07-15 | End: 2024-07-15 | Stop reason: HOSPADM

## 2024-07-15 RX ORDER — ONDANSETRON 2 MG/ML
4 INJECTION INTRAMUSCULAR; INTRAVENOUS
Status: DISCONTINUED | OUTPATIENT
Start: 2024-07-15 | End: 2024-07-15 | Stop reason: HOSPADM

## 2024-07-15 RX ORDER — LIDOCAINE HYDROCHLORIDE 10 MG/ML
1 INJECTION, SOLUTION EPIDURAL; INFILTRATION; INTRACAUDAL; PERINEURAL
Status: COMPLETED | OUTPATIENT
Start: 2024-07-15 | End: 2024-07-15

## 2024-07-15 RX ORDER — SODIUM CHLORIDE, SODIUM LACTATE, POTASSIUM CHLORIDE, CALCIUM CHLORIDE 600; 310; 30; 20 MG/100ML; MG/100ML; MG/100ML; MG/100ML
INJECTION, SOLUTION INTRAVENOUS CONTINUOUS
Status: DISCONTINUED | OUTPATIENT
Start: 2024-07-15 | End: 2024-07-15 | Stop reason: HOSPADM

## 2024-07-15 RX ORDER — MAGNESIUM HYDROXIDE 1200 MG/15ML
LIQUID ORAL CONTINUOUS PRN
Status: COMPLETED | OUTPATIENT
Start: 2024-07-15 | End: 2024-07-15

## 2024-07-15 RX ADMIN — PROPOFOL 30 MG: 10 INJECTION, EMULSION INTRAVENOUS at 15:28

## 2024-07-15 RX ADMIN — PROPOFOL 30 MG: 10 INJECTION, EMULSION INTRAVENOUS at 15:31

## 2024-07-15 RX ADMIN — PROPOFOL 30 MG: 10 INJECTION, EMULSION INTRAVENOUS at 15:33

## 2024-07-15 RX ADMIN — PROPOFOL 100 MCG/KG/MIN: 10 INJECTION, EMULSION INTRAVENOUS at 15:10

## 2024-07-15 RX ADMIN — FENTANYL CITRATE 25 MCG: 50 INJECTION, SOLUTION INTRAMUSCULAR; INTRAVENOUS at 15:33

## 2024-07-15 RX ADMIN — FENTANYL CITRATE 25 MCG: 50 INJECTION, SOLUTION INTRAMUSCULAR; INTRAVENOUS at 15:44

## 2024-07-15 RX ADMIN — LIDOCAINE HYDROCHLORIDE 1 ML: 10 INJECTION, SOLUTION EPIDURAL; INFILTRATION; INTRACAUDAL; PERINEURAL at 13:50

## 2024-07-15 RX ADMIN — FENTANYL CITRATE 50 MCG: 0.05 INJECTION, SOLUTION INTRAMUSCULAR; INTRAVENOUS at 16:12

## 2024-07-15 RX ADMIN — SODIUM CHLORIDE, POTASSIUM CHLORIDE, SODIUM LACTATE AND CALCIUM CHLORIDE: 600; 310; 30; 20 INJECTION, SOLUTION INTRAVENOUS at 13:50

## 2024-07-15 RX ADMIN — FENTANYL CITRATE 50 MCG: 50 INJECTION, SOLUTION INTRAMUSCULAR; INTRAVENOUS at 15:39

## 2024-07-15 RX ADMIN — MIDAZOLAM HYDROCHLORIDE 2 MG: 1 INJECTION, SOLUTION INTRAMUSCULAR; INTRAVENOUS at 15:03

## 2024-07-15 RX ADMIN — KETAMINE HYDROCHLORIDE 40 MG: 10 INJECTION INTRAMUSCULAR; INTRAVENOUS at 15:13

## 2024-07-15 RX ADMIN — CEFAZOLIN 3000 MG: 2 INJECTION, POWDER, FOR SOLUTION INTRAMUSCULAR; INTRAVENOUS at 15:11

## 2024-07-15 ASSESSMENT — PAIN SCALES - GENERAL
PAINLEVEL_OUTOF10: 5
PAINLEVEL_OUTOF10: 4
PAINLEVEL_OUTOF10: 2
PAINLEVEL_OUTOF10: 0
PAINLEVEL_OUTOF10: 4
PAINLEVEL_OUTOF10: 5
PAINLEVEL_OUTOF10: 8

## 2024-07-15 ASSESSMENT — PAIN DESCRIPTION - DESCRIPTORS: DESCRIPTORS: SORE

## 2024-07-15 ASSESSMENT — PAIN DESCRIPTION - ORIENTATION: ORIENTATION: LOWER

## 2024-07-15 ASSESSMENT — PAIN DESCRIPTION - LOCATION
LOCATION: BACK

## 2024-07-15 ASSESSMENT — ENCOUNTER SYMPTOMS: SHORTNESS OF BREATH: 1

## 2024-07-15 NOTE — ANESTHESIA PRE PROCEDURE
oz)   07/10/24 129.3 kg (285 lb)   06/28/24 128 kg (282 lb 3.2 oz)     Body mass index is 56.17 kg/m².    CBC:   Lab Results   Component Value Date/Time    WBC 14.7 06/12/2024 11:50 AM    RBC 5.41 06/12/2024 11:50 AM    HGB 17.7 06/12/2024 11:50 AM    HCT 52.0 06/12/2024 11:50 AM    MCV 96.1 06/12/2024 11:50 AM    RDW 12.1 06/12/2024 11:50 AM     06/12/2024 11:50 AM       CMP:   Lab Results   Component Value Date/Time     06/12/2024 11:49 AM    K 4.2 06/12/2024 11:49 AM    K 4.4 02/28/2022 05:43 AM     06/12/2024 11:49 AM    CO2 24 06/12/2024 11:49 AM    BUN 16 06/12/2024 11:49 AM    CREATININE 0.83 06/12/2024 11:49 AM    GFRAA >60.0 02/28/2022 05:43 AM    LABGLOM 84.1 06/12/2024 11:49 AM    LABGLOM 83.0 03/29/2024 09:09 AM    GLUCOSE 148 06/12/2024 11:49 AM    CALCIUM 9.6 06/12/2024 11:49 AM    BILITOT 0.3 03/29/2024 09:09 AM    ALKPHOS 102 03/29/2024 09:09 AM    AST 29 03/29/2024 09:09 AM    ALT 53 03/29/2024 09:09 AM       POC Tests: No results for input(s): \"POCGLU\", \"POCNA\", \"POCK\", \"POCCL\", \"POCBUN\", \"POCHEMO\", \"POCHCT\" in the last 72 hours.    Coags:   Lab Results   Component Value Date/Time    PROTIME 12.5 02/27/2022 05:20 PM    INR 0.9 02/27/2022 05:20 PM    APTT 27.5 02/27/2022 05:20 PM       HCG (If Applicable):   Lab Results   Component Value Date    PREGTESTUR Negative 11/18/2014        ABGs: No results found for: \"PHART\", \"PO2ART\", \"GAL3OUE\", \"OVX5MWU\", \"BEART\", \"J6WCJWTN\"     Type & Screen (If Applicable):  No results found for: \"LABABO\"    Drug/Infectious Status (If Applicable):  Lab Results   Component Value Date/Time    HEPCAB NONREACTIVE 02/28/2022 05:43 AM       COVID-19 Screening (If Applicable):   Lab Results   Component Value Date/Time    COVID19 Not Detected 01/10/2023 11:25 PM           Anesthesia Evaluation    Airway: Mallampati: II  TM distance: >3 FB   Neck ROM: full  Mouth opening: > = 3 FB   Dental:    (+) upper dentures and lower dentures      Pulmonary: breath

## 2024-07-15 NOTE — PROGRESS NOTES
Assisted to dress, peripad clean, abdomen soft. Assisted into wheelchair, taken to bathroom voided moderate amount clear sabino urine.

## 2024-07-15 NOTE — PROGRESS NOTES
One touch 100. Assisted to reposition, states pain \"is so much better,\" tolerating po fluids and crackers, Abdomen soft, peripad clean and dry.

## 2024-07-15 NOTE — ANESTHESIA POSTPROCEDURE EVALUATION
Department of Anesthesiology  Postprocedure Note    Patient: Lakshmi Rebolledo  MRN: [id number]  YOB: 1970  Date of evaluation: 7/15/2024    Procedure Summary       Date: 07/15/24 Room / Location: [address]    Anesthesia Start: 1503 Anesthesia Stop: 1606    Procedure: EXAMINATION UNDER ANESTHESIA Diagnosis:       PMB (postmenopausal bleeding)      Morbid obesity (HCC)      (PMB (postmenopausal bleeding) [N95.0])      (Morbid obesity (HCC) [E66.01])    Surgeons: Bernard Charlton DO Responsible Provider: Audrey Ignacio MD    Anesthesia Type: MAC ASA Status: 3            Anesthesia Type: No value filed.    Fabian Phase I:      Fabian Phase II:      Anesthesia Post Evaluation    Patient location during evaluation: PACU  Patient participation: complete - patient participated  Level of consciousness: awake and awake and alert  Airway patency: patent  Nausea & Vomiting: no nausea and no vomiting  Cardiovascular status: blood pressure returned to baseline and hemodynamically stable  Respiratory status: acceptable  Hydration status: euvolemic  Comments: Pt tearful in PACU.  Pain management: adequate        No notable events documented.

## 2024-07-15 NOTE — H&P
I attest that I have reviewed the H&P with the patient. All risks, benefits and alternative therapies were reviewed and the patient agrees to proceed with plan of care.   Bernard Koo, DO

## 2024-07-15 NOTE — PROGRESS NOTES
Received from or into pacu accompanied by FREDRICK Astudillo crna. Pt crying at intervals  complaining of \"terrible back pain' states \"I always have it, \" assisted to reposition, supported by pilows, see assessent. Clean peripad in place.

## 2024-07-15 NOTE — OP NOTE
OPERATIVE NOTE: HYSTEROSCOPY, D&C    54 y.o.  who presents today with h/o persistent PMB. Patient with a h/o recent pelvic US showing ES 21mm. The patient with pre-operative evaluation with EMB however due to the depth of the cervix and scaring behind pubic symphysis was unable to reach. Recommended consult with gynonc however pt desired operative to avoid the travel.  Risks, benefits and alternative evaluation to be sure therapies for treatment were discussed with the patient. The patient desires surgical management of MMR and patient planned for hysteroscopy, D&C.      PreOp Dx: PMB with thickened endometrium  Morbid obesity  Enterocele/rectocele  PostOp Dx: same   Procedure: EUA, failed hystereoscopy  Surgeon: Zoltan Booker DO  Anesthesia: GET  EBL: Minimal  Specimens: n/a    The patient was taken to the OR where she was prepped and draped in the normal sterile fashion in a dorsal lithotomy position. Time out was then taken to ensure proper patient, placement and procedure.   A weighted/ Bivalved speculum was placed along with right angle retractor and the cervix was unable to be grasped with a tenaculum. With bimanual examination the cervix is palpable however unable to identify with speculum  or retraction. We were unable to locate cervix with instuments.  All instruments were then removed. Sponge and instrument counts were correct x 3 and the patient was taken to recovery in stable condition.     Bernard Koo DO

## 2024-07-15 NOTE — DISCHARGE INSTRUCTIONS
F,  Increased drainage, puss, and/or odor from surgical site,  Pain not relieved by medications ordered  Unable to urinate adult upon discharge and for 24 hours after surgery.

## 2024-07-16 DIAGNOSIS — N88.2 CERVICAL STENOSIS (UTERINE CERVIX): Primary | ICD-10-CM

## 2024-07-16 DIAGNOSIS — N81.6 FEMALE RECTOCELE WITH ENTEROCELE: ICD-10-CM

## 2024-07-16 DIAGNOSIS — K46.9 FEMALE RECTOCELE WITH ENTEROCELE: ICD-10-CM

## 2024-07-16 DIAGNOSIS — N95.0 PMB (POSTMENOPAUSAL BLEEDING): ICD-10-CM

## 2024-07-16 DIAGNOSIS — R93.89 THICKENED ENDOMETRIUM: ICD-10-CM

## 2024-07-17 RX ORDER — TOPIRAMATE 50 MG/1
50 TABLET, FILM COATED ORAL 2 TIMES DAILY
Qty: 60 TABLET | Refills: 3 | OUTPATIENT
Start: 2024-07-17

## 2024-07-23 ENCOUNTER — TELEPHONE (OUTPATIENT)
Dept: OBGYN CLINIC | Age: 54
End: 2024-07-23

## 2024-07-23 NOTE — TELEPHONE ENCOUNTER
Spoke to pt about her referral to gyn/onc.  She was given another number to try to schedule.  It was for the [facility].  She spoke to them yesterday and she needs a new referral faxed to them to anyone in the practice.  She is asking for this referral to be faxed to them at [phone].  Please let pt know when faxed so she can get scheduled as soon as possible.

## 2024-07-25 NOTE — TELEPHONE ENCOUNTER
LM for pt to inform that the referral was sent to the number she provided and they should contact her to schedule or she can call and get scheduled

## 2024-07-29 ENCOUNTER — TELEPHONE (OUTPATIENT)
Dept: BARIATRICS/WEIGHT MGMT | Age: 54
End: 2024-07-29

## 2024-07-29 ENCOUNTER — OFFICE VISIT (OUTPATIENT)
Dept: BARIATRICS/WEIGHT MGMT | Age: 54
End: 2024-07-29
Payer: COMMERCIAL

## 2024-07-29 VITALS — HEIGHT: 60 IN | WEIGHT: 285 LBS | BODY MASS INDEX: 55.95 KG/M2

## 2024-07-29 DIAGNOSIS — E66.01 MORBID OBESITY (HCC): ICD-10-CM

## 2024-07-29 DIAGNOSIS — E11.65 TYPE 2 DIABETES MELLITUS WITH HYPERGLYCEMIA, WITHOUT LONG-TERM CURRENT USE OF INSULIN (HCC): Primary | ICD-10-CM

## 2024-07-29 DIAGNOSIS — E11.65 TYPE 2 DIABETES MELLITUS WITH HYPERGLYCEMIA, WITHOUT LONG-TERM CURRENT USE OF INSULIN (HCC): ICD-10-CM

## 2024-07-29 DIAGNOSIS — Z71.3 DIETARY COUNSELING AND SURVEILLANCE: ICD-10-CM

## 2024-07-29 LAB
TSH REFLEX: 2.19 UIU/ML (ref 0.44–3.86)
VITAMIN B-12: 776 PG/ML (ref 232–1245)

## 2024-07-29 PROCEDURE — 3051F HG A1C>EQUAL 7.0%<8.0%: CPT | Performed by: DIETITIAN, REGISTERED

## 2024-07-29 PROCEDURE — 97803 MED NUTRITION INDIV SUBSEQ: CPT | Performed by: DIETITIAN, REGISTERED

## 2024-07-29 RX ORDER — ONDANSETRON 4 MG/1
4 TABLET, FILM COATED ORAL DAILY PRN
Qty: 30 TABLET | Refills: 0 | Status: SHIPPED | OUTPATIENT
Start: 2024-07-29

## 2024-07-29 NOTE — TELEPHONE ENCOUNTER
Dr. Ahn,     Patient was here today for an appointment with Candice and mentioned that  she stopped taking Jardiance due to nausea, stomach pain and cramping. She is asking if there is anything else she can take in place of this medication.     Pharmacy: Luis Poon CMA

## 2024-07-29 NOTE — PROGRESS NOTES
Nutrition follow up prior to surgery  Visit number:  2 out of 6 for Joelle en Y gastric bypass.    Initial Weight: 285 lbs    Wt Readings from Last 3 Encounters:   07/15/24 130.5 kg (287 lb 9.6 oz)   07/10/24 129.3 kg (285 lb)   06/28/24 128 kg (282 lb 3.2 oz)     stable / unchanged.    Previous nutrition goals:   Increase meal frequency to 4-6 /day including protein first at each  Practice  liquids from solids  Practice eating slower, follow rule of 20  Read \"Patient Guide to Bariatric Surgery\" before next visit    Nutrition goals: met, reports 3 meals/day, including protein at most although high fat choices, 1-2 snacks/day consisting usually of fruit.  Patient has also omitted diet soda and caffeine.  Patient has started walking to/from mailbox most days about 20 minutes and continues chair exercise with resistance bands every other day.    PES Statement:  Overweight/Obesity related to a complex combination of decreased energy needs, disordered eating patterns, physical inactivity, and increased psychological/life stress as evidenced by BMI greater than 30 and inability to maintain a significant amount of weight loss through conventional weight loss interventions.    Intervention:  Reviewed previous goals and set new goals.  Stressed importance of preoperative weight loss or surgery may be postponed or cancelled.  Provided continued education regarding diet and lifestyle changes for optimal success post bariatric surgery.  Encouragement and support provided.    Education materials provided:   none    Plan/Recommendations:    Begin taking Bariatric MVI with iron and Calcium citrate 500-600mg twice daily  Sample protein shakes  Read labels for leaner proteins, < 5 grams fat /serving  Keep daily food journal    Follow up: 4 weeks     Total time involved in direct patient education: 45 minutes    Candice Rodriguez RD

## 2024-07-29 NOTE — PATIENT INSTRUCTIONS
Begin taking Bariatric MVI with iron and Calcium citrate 500-600mg twice daily  Sample protein shakes  Read labels for leaner proteins, < 5 grams fat /serving  Keep daily food journal

## 2024-07-30 LAB
AMPHET CTO UR CFM-MCNC: NEGATIVE NG/ML
BARBITURATES CTO UR CFM-MCNC: NEGATIVE NG/ML
BENZODIAZ CTO UR CFM-MCNC: NEGATIVE NG/ML
CANNABINOIDS CTO UR CFM-MCNC: NEGATIVE NG/ML
COCAINE CTO UR CFM-MCNC: NEGATIVE NG/ML
CREAT UR-MCNC: 123 MG/DL (ref 20–400)
Lab: NORMAL
OPIATES CTO UR CFM-MCNC: NEGATIVE NG/ML
PCP CTO UR CFM-MCNC: NEGATIVE NG/ML

## 2024-07-31 ENCOUNTER — OFFICE VISIT (OUTPATIENT)
Dept: PULMONOLOGY | Age: 54
End: 2024-07-31
Payer: COMMERCIAL

## 2024-07-31 VITALS
DIASTOLIC BLOOD PRESSURE: 82 MMHG | HEIGHT: 60 IN | RESPIRATION RATE: 18 BRPM | BODY MASS INDEX: 55.44 KG/M2 | WEIGHT: 282.4 LBS | TEMPERATURE: 97.2 F | OXYGEN SATURATION: 98 % | SYSTOLIC BLOOD PRESSURE: 130 MMHG | HEART RATE: 77 BPM

## 2024-07-31 DIAGNOSIS — J98.4 RESTRICTIVE LUNG DISEASE: ICD-10-CM

## 2024-07-31 DIAGNOSIS — I27.20 PULMONARY HYPERTENSION (HCC): ICD-10-CM

## 2024-07-31 DIAGNOSIS — R06.09 DYSPNEA ON EXERTION: Primary | ICD-10-CM

## 2024-07-31 DIAGNOSIS — I51.89 DIASTOLIC DYSFUNCTION: ICD-10-CM

## 2024-07-31 DIAGNOSIS — E66.01 MORBID OBESITY WITH BMI OF 60.0-69.9, ADULT (HCC): ICD-10-CM

## 2024-07-31 DIAGNOSIS — G47.33 OSA (OBSTRUCTIVE SLEEP APNEA): ICD-10-CM

## 2024-07-31 DIAGNOSIS — Z87.891 PERSONAL HISTORY OF TOBACCO USE: ICD-10-CM

## 2024-07-31 PROCEDURE — 3075F SYST BP GE 130 - 139MM HG: CPT | Performed by: INTERNAL MEDICINE

## 2024-07-31 PROCEDURE — 3079F DIAST BP 80-89 MM HG: CPT | Performed by: INTERNAL MEDICINE

## 2024-07-31 PROCEDURE — G0296 VISIT TO DETERM LDCT ELIG: HCPCS | Performed by: INTERNAL MEDICINE

## 2024-07-31 PROCEDURE — 99204 OFFICE O/P NEW MOD 45 MIN: CPT | Performed by: INTERNAL MEDICINE

## 2024-07-31 NOTE — PROGRESS NOTES
Subjective:     Lakshmi Rebolledo is a 54 y.o. female who complains today of:     Chief Complaint   Patient presents with    New Patient     Dyspnea on exertion per Dr. Nirmala COTTO  Patient presents for shortness of breath    2024  Presents for evaluation of shortness of breath, has been going on for almost 2 years, she quit smoking on , she smoked since age 9 up to 4 packs/day, has no chest pain, no fever, she was recently diagnosed with mild sleep apnea and significant nocturnal hypoxia, she has chronic low back pain and gained weight over the years.  She would like to undergo bariatric surgery so she can eventually have back surgery done.  No lower extremity edema, no heartburn, no nasal congestion or postnasal drip.         Allergies:  Jardiance [empagliflozin] and Tape [adhesive tape]  Past Medical History:   Diagnosis Date    Abnormal Pap smear of cervix     Cerebral artery occlusion with cerebral infarction ([facility])     CHF (congestive heart failure) ([facility]) ?    Chlamydia     Closed wedge compression fracture of L3 vertebra with routine healing 2024    COPD (chronic obstructive pulmonary disease) ([facility])     COPD (chronic obstructive pulmonary disease) ([facility]) 2022    Depression     Drug effect     plastic  tape    Esophagitis determined by endoscopy 07/10/2024    On EGD 07/10/2024 - noted at GE junction    Heart disease 2014    History of abnormal cervical Pap smear 2024    History of blood transfusion     post delivery 32 yrs ago    History of colon polyps 07/10/2024    Hyperlipidemia     Hypertension 1990 cont    Lumbar spondylosis 2024    Mass of multiple sites of right breast 2024    Menopausal symptoms finished     MI (myocardial infarction) ([facility])     Migraine     Morbid obesity with BMI of 60.0-69.9, adult ([facility]) 2024    MS (multiple sclerosis) ([facility])     Obesity 2024    Pre-diabetes 2024     labor

## 2024-08-01 ENCOUNTER — APPOINTMENT (OUTPATIENT)
Dept: GENERAL RADIOLOGY | Age: 54
End: 2024-08-01
Attending: STUDENT IN AN ORGANIZED HEALTH CARE EDUCATION/TRAINING PROGRAM
Payer: COMMERCIAL

## 2024-08-01 ENCOUNTER — HOSPITAL ENCOUNTER (OUTPATIENT)
Age: 54
Setting detail: OUTPATIENT SURGERY
Discharge: HOME | End: 2024-08-01
Attending: STUDENT IN AN ORGANIZED HEALTH CARE EDUCATION/TRAINING PROGRAM | Admitting: STUDENT IN AN ORGANIZED HEALTH CARE EDUCATION/TRAINING PROGRAM
Payer: COMMERCIAL

## 2024-08-01 VITALS
WEIGHT: 285 LBS | SYSTOLIC BLOOD PRESSURE: 163 MMHG | TEMPERATURE: 97.6 F | BODY MASS INDEX: 57.45 KG/M2 | HEART RATE: 84 BPM | DIASTOLIC BLOOD PRESSURE: 82 MMHG | HEIGHT: 59 IN | RESPIRATION RATE: 14 BRPM | OXYGEN SATURATION: 96 %

## 2024-08-01 DIAGNOSIS — M47.816 LUMBAR SPONDYLOSIS: Primary | ICD-10-CM

## 2024-08-01 LAB
GLUCOSE BLD-MCNC: 139 MG/DL (ref 70–99)
PERFORMED ON: ABNORMAL

## 2024-08-01 PROCEDURE — 3600000003 HC SURGERY LEVEL 3 BASE: Performed by: STUDENT IN AN ORGANIZED HEALTH CARE EDUCATION/TRAINING PROGRAM

## 2024-08-01 PROCEDURE — 2500000003 HC RX 250 WO HCPCS: Performed by: STUDENT IN AN ORGANIZED HEALTH CARE EDUCATION/TRAINING PROGRAM

## 2024-08-01 PROCEDURE — 7100000010 HC PHASE II RECOVERY - FIRST 15 MIN: Performed by: STUDENT IN AN ORGANIZED HEALTH CARE EDUCATION/TRAINING PROGRAM

## 2024-08-01 PROCEDURE — 64493 INJ PARAVERT F JNT L/S 1 LEV: CPT | Performed by: STUDENT IN AN ORGANIZED HEALTH CARE EDUCATION/TRAINING PROGRAM

## 2024-08-01 PROCEDURE — 77002 NEEDLE LOCALIZATION BY XRAY: CPT

## 2024-08-01 PROCEDURE — 64495 INJ PARAVERT F JNT L/S 3 LEV: CPT | Performed by: STUDENT IN AN ORGANIZED HEALTH CARE EDUCATION/TRAINING PROGRAM

## 2024-08-01 PROCEDURE — 6360000002 HC RX W HCPCS: Performed by: STUDENT IN AN ORGANIZED HEALTH CARE EDUCATION/TRAINING PROGRAM

## 2024-08-01 PROCEDURE — 64494 INJ PARAVERT F JNT L/S 2 LEV: CPT | Performed by: STUDENT IN AN ORGANIZED HEALTH CARE EDUCATION/TRAINING PROGRAM

## 2024-08-01 PROCEDURE — 2709999900 HC NON-CHARGEABLE SUPPLY: Performed by: STUDENT IN AN ORGANIZED HEALTH CARE EDUCATION/TRAINING PROGRAM

## 2024-08-01 PROCEDURE — 6370000000 HC RX 637 (ALT 250 FOR IP): Performed by: STUDENT IN AN ORGANIZED HEALTH CARE EDUCATION/TRAINING PROGRAM

## 2024-08-01 RX ORDER — DEXAMETHASONE SODIUM PHOSPHATE 10 MG/ML
10 INJECTION, SOLUTION INTRAMUSCULAR; INTRAVENOUS
Status: DISCONTINUED | OUTPATIENT
Start: 2024-08-01 | End: 2024-08-01 | Stop reason: HOSPADM

## 2024-08-01 RX ORDER — SODIUM CHLORIDE 9 MG/ML
20 INJECTION, SOLUTION INTRAMUSCULAR; INTRAVENOUS; SUBCUTANEOUS
Status: DISCONTINUED | OUTPATIENT
Start: 2024-08-01 | End: 2024-08-01 | Stop reason: HOSPADM

## 2024-08-01 RX ORDER — DEXAMETHASONE SODIUM PHOSPHATE 10 MG/ML
INJECTION INTRAMUSCULAR; INTRAVENOUS PRN
Status: DISCONTINUED | OUTPATIENT
Start: 2024-08-01 | End: 2024-08-01 | Stop reason: ALTCHOICE

## 2024-08-01 RX ORDER — METHYLPREDNISOLONE ACETATE 80 MG/ML
80 INJECTION, SUSPENSION INTRA-ARTICULAR; INTRALESIONAL; INTRAMUSCULAR; SOFT TISSUE
Status: DISCONTINUED | OUTPATIENT
Start: 2024-08-01 | End: 2024-08-01 | Stop reason: HOSPADM

## 2024-08-01 RX ORDER — LIDOCAINE HYDROCHLORIDE 10 MG/ML
INJECTION, SOLUTION EPIDURAL; INFILTRATION; INTRACAUDAL; PERINEURAL PRN
Status: DISCONTINUED | OUTPATIENT
Start: 2024-08-01 | End: 2024-08-01 | Stop reason: ALTCHOICE

## 2024-08-01 RX ORDER — LIDOCAINE HYDROCHLORIDE 10 MG/ML
15 INJECTION, SOLUTION EPIDURAL; INFILTRATION; INTRACAUDAL; PERINEURAL
Status: DISCONTINUED | OUTPATIENT
Start: 2024-08-01 | End: 2024-08-01 | Stop reason: HOSPADM

## 2024-08-01 RX ORDER — LIDOCAINE HYDROCHLORIDE 20 MG/ML
INJECTION, SOLUTION EPIDURAL; INFILTRATION; INTRACAUDAL; PERINEURAL PRN
Status: DISCONTINUED | OUTPATIENT
Start: 2024-08-01 | End: 2024-08-01 | Stop reason: ALTCHOICE

## 2024-08-01 RX ORDER — LIDOCAINE HYDROCHLORIDE 20 MG/ML
10 INJECTION, SOLUTION EPIDURAL; INFILTRATION; INTRACAUDAL; PERINEURAL
Status: DISCONTINUED | OUTPATIENT
Start: 2024-08-01 | End: 2024-08-01 | Stop reason: HOSPADM

## 2024-08-01 RX ORDER — LORAZEPAM 1 MG/1
1 TABLET ORAL
Status: COMPLETED | OUTPATIENT
Start: 2024-08-01 | End: 2024-08-01

## 2024-08-01 RX ORDER — BUPIVACAINE HYDROCHLORIDE 2.5 MG/ML
10 INJECTION, SOLUTION EPIDURAL; INFILTRATION; INTRACAUDAL
Status: DISCONTINUED | OUTPATIENT
Start: 2024-08-01 | End: 2024-08-01 | Stop reason: HOSPADM

## 2024-08-01 RX ADMIN — LORAZEPAM 1 MG: 1 TABLET ORAL at 08:16

## 2024-08-01 ASSESSMENT — PAIN SCALES - GENERAL: PAINLEVEL_OUTOF10: 0

## 2024-08-01 ASSESSMENT — PAIN - FUNCTIONAL ASSESSMENT: PAIN_FUNCTIONAL_ASSESSMENT: 0-10

## 2024-08-01 NOTE — DISCHARGE INSTRUCTIONS
Do NOT submerge in water for 48 hours.   Follow all pre-op instructions from Dr. Guzman's office.    If you received oral Ativan or IV versed:  Do not drive or operate machinery for 24hrs after discharge.  Do not drink alcohol, take tranquilizers, sleeping medication, or any other medication not directly instructed by your physician.   Do not make any important decisions or sign any legal documents for 24hrs.  Have someone with you for 24hrs after procedure to assist you as needed.    OPERATIVE SITE:  A small amount of bleeding or drainage after injection is normal. Your physician will provide you with specific instructions on how to care for your surgical site and/or dressing.   Try not to touch your site unless necessary.  Always wash your hands BEFORE and AFTER changing your dressing if instructed by your physician.   Proper handwashing includes wetting your hands with clean water, applying soap, lathering your hands by rubbing them together with soap for 20 seconds, rinsing them with clean water, and drying them with a clean towel. If soap and water is not available, alcohol-based  may be applied by rubbing the hands together and allowing them to dry for 20 seconds.      PAIN:  Pain after a procedure is normal and should be expected. When you go home, the anesthesia wears off, and you may experience increased discomfort. Your provider will give you specific instructions on what pain medication to take at home. Listed below is additional information on treating pain after a procedure:  Pain medication can give you an upset stomach. Unless your provider has instructed you not to eat or drink, the pain medication should be taken with a small amount of food. Eating will decrease the chance of an upset stomach.  Pain medication can take about 20-30 minutes to start working, so do not wait until the pain worsens before taking a dose. Remember, always take over-the-counter and prescription drugs only as

## 2024-08-01 NOTE — PROGRESS NOTES
Pt received from procedure room via wheelchair. Report received from OR nurse. Pt transferred and ambulating without difficulty.  Neuro intact, no numbness or tingling.  Pt tolerated procedure well. No Dressings. Reviewed written discharge instructions with patient. Copy of written discharge instructions given to patient.   Pain 0/10   Neuro Assessment wnl  Discharge via ambulation with .

## 2024-08-01 NOTE — PROCEDURES
PROCEDURE NOTE  Date: 2024   Name: Lakshmi Rebolledo  YOB: 1970                          [facility]  Neurosurgery and Pain Management Center  [address] [place] [address], [address]  [place], [place], [address]  P: [phone]  F: [phone]          LUMBAR/SACRAL MEDIAL BRANCH BLOCK      Patient Name: Lakshmi Rebolledo : 1970  Date:24  Physician: Ta Guzman DO       Lakshmi Rebolledo is here today for interventional pain management.      Discussed the risks including but not limited to bleeding, infection, worsened pain, damage to surrounding structures, side effects, toxicity, allergic reactions to medications used, need for surgery, pneumothorax, abdominal and visceral injury, as well as catastrophic injury such as vision loss, paralysis, spinal cord or plexus injury, stroke, bowel or bladder incontinence, chest tube insertion, ventilator dependence, and death. Discussed the risks, benefits, and alternatives to the procedure including no procedure at all. Discussed that we cannot undo any permanent neurologic or orthopaedic damage or change the course of any underlying disease. After thorough discussion, patient expressed understanding and willingness to proceed. Written consent was obtained and is in the chart. Verbal consent to proceed was obtained.    Standard ASI guidelines were followed and sterile technique used.  Area was cleaned with Chloraprep.  Informed consent was obtained.  Fluoroscopic guidance was used for this procedure. Junction of superior articular process and transverse process was identified. For L5 dorsal primary ramus groove of sacral ala and SAP of S1 was identified. Appropriate length spinal needle (22g 5 in)was used and advanced to correct anatomic location. Negative aspiration was achieved.  At each site approximately 1/2cc of 2% preservative free Lidocaine was injected without difficulty.    Patient tolerated the procedure well, no obvious complications

## 2024-08-02 LAB
ANABASINE UR-MCNC: <5 NG/ML
COTININE UR-MCNC: 90 NG/ML
NICOTINE UR-MCNC: 101 NG/ML
TRANS-3-OH-COTININE UR-MCNC: 270 NG/ML
VIT B1 BLD-MCNC: 194 NMOL/L (ref 70–180)

## 2024-08-06 ENCOUNTER — HOSPITAL ENCOUNTER (OUTPATIENT)
Dept: DIABETES SERVICES | Age: 54
Setting detail: THERAPIES SERIES
Discharge: HOME | End: 2024-08-06
Payer: COMMERCIAL

## 2024-08-06 PROCEDURE — G0109 DIAB MANAGE TRN IND/GROUP: HCPCS

## 2024-08-07 ENCOUNTER — HOSPITAL ENCOUNTER (OUTPATIENT)
Dept: DIABETES SERVICES | Age: 54
Setting detail: THERAPIES SERIES
Discharge: HOME | End: 2024-08-07
Payer: COMMERCIAL

## 2024-08-07 PROCEDURE — G0109 DIAB MANAGE TRN IND/GROUP: HCPCS

## 2024-08-07 NOTE — PROGRESS NOTES
Diabetes Self- Management Education Program Assessment and Education Record-   Also see Diabetic Screening    Patient, Lakshmi Rebolledo, has been diagnosed with  [] Type 1 [x] Type 2 diabetes.      [x] Patient is new to diabetes, and here for initial diabetes self-management assessment and education.  [] Patient is here for review of diabetes self-management assessment and education.     Today's visit was in an [x] individual [] group setting.  Patient came [] alone [x] with family member. Mother who has prediabetes    [x] Patient was diagnosed with diabetes in:  2024    Goals setting:  Initial Goal Set with Patient  [x]Yes  [] NO      MEDICAL HISTORY:  Past Medical History:   Diagnosis Date    Abnormal Pap smear of cervix     Cerebral artery occlusion with cerebral infarction ([facility])     CHF (congestive heart failure) ([facility]) ?    Chlamydia     Closed wedge compression fracture of L3 vertebra with routine healing 2024    COPD (chronic obstructive pulmonary disease) ([facility])     COPD (chronic obstructive pulmonary disease) ([facility]) 2022    Depression     Drug effect     plastic  tape    Heart disease     History of abnormal cervical Pap smear 2024    Hypertension 1990 cont    Lumbar spondylosis 2024    Menopausal symptoms fin2022    MI (myocardial infarction) ([facility])     Migraine     Morbid obesity with BMI of 60.0-69.9, adult ([facility]) 2024    MS (multiple sclerosis) ([facility])     Obesity 2024    Pre-diabetes 2024     labor     Restless legs syndrome 20 plus years never dianosed    Rh incompatibility     Sleep apnea possible    TB (tuberculosis)     Thrombocytopenia ([facility]) 2024    Tricompartment osteoarthritis of left knee 2024    Type 2 diabetes mellitus without complication ([facility])      Family History   Problem Relation Age of Onset    Asthma Mother     High Blood Pressure Mother     High Cholesterol Mother

## 2024-08-08 ENCOUNTER — HOSPITAL ENCOUNTER (OUTPATIENT)
Dept: DIABETES SERVICES | Age: 54
Setting detail: THERAPIES SERIES
Discharge: HOME | End: 2024-08-08
Payer: COMMERCIAL

## 2024-08-08 PROCEDURE — 97804 MEDICAL NUTRITION GROUP: CPT

## 2024-08-08 NOTE — PROGRESS NOTES
Diabetes Self- Management Education Program Assessment and Education Record-   Also see Diabetic Screening    Patient, Lakshmi Rebolledo, has been diagnosed with  [] Type 1 [x] Type 2 diabetes.      [x] Patient is new to diabetes, and here for initial diabetes self-management assessment and education.  [] Patient is here for review of diabetes self-management assessment and education.     Today's visit was in an [x] individual [] group setting.  Patient came [] alone [x] with family member. Mother who has prediabetes    [x] Patient was diagnosed with diabetes in:  2024    Goals setting:  Initial Goal Set with Patient  [x]Yes  [] NO      MEDICAL HISTORY:  Past Medical History:   Diagnosis Date    Abnormal Pap smear of cervix     Cerebral artery occlusion with cerebral infarction ([facility])     CHF (congestive heart failure) ([facility]) ?    Chlamydia     Closed wedge compression fracture of L3 vertebra with routine healing 2024    COPD (chronic obstructive pulmonary disease) ([facility])     COPD (chronic obstructive pulmonary disease) ([facility]) 2022    Depression     Drug effect     plastic  tape    Heart disease     History of abnormal cervical Pap smear 2024    Hypertension 1990 cont    Lumbar spondylosis 2024    Menopausal symptoms fin2022    MI (myocardial infarction) ([facility])     Migraine     Morbid obesity with BMI of 60.0-69.9, adult ([facility]) 2024    MS (multiple sclerosis) ([facility])     Obesity 2024    Pre-diabetes 2024     labor     Restless legs syndrome 20 plus years never dianosed    Rh incompatibility     Sleep apnea possible    TB (tuberculosis)     Thrombocytopenia ([facility]) 2024    Tricompartment osteoarthritis of left knee 2024    Type 2 diabetes mellitus without complication ([facility])      Family History   Problem Relation Age of Onset    Asthma Mother     High Blood Pressure Mother     High Cholesterol Mother

## 2024-08-11 ENCOUNTER — HOSPITAL ENCOUNTER (OUTPATIENT)
Dept: SLEEP CENTER | Age: 54
Discharge: HOME | End: 2024-08-13
Payer: COMMERCIAL

## 2024-08-11 PROCEDURE — 95811 POLYSOM 6/>YRS CPAP 4/> PARM: CPT

## 2024-08-14 DIAGNOSIS — G47.33 OSA (OBSTRUCTIVE SLEEP APNEA): ICD-10-CM

## 2024-08-16 ENCOUNTER — HOSPITAL ENCOUNTER (OUTPATIENT)
Dept: LAB | Age: 54
Discharge: HOME | End: 2024-08-16

## 2024-08-16 DIAGNOSIS — N95.0 POSTMENOPAUSAL BLEEDING: ICD-10-CM

## 2024-08-16 DIAGNOSIS — E66.01 MORBID OBESITY WITH BMI OF 60.0-69.9, ADULT (HCC): ICD-10-CM

## 2024-08-16 DIAGNOSIS — R79.89 ABNORMAL LFTS (LIVER FUNCTION TESTS): ICD-10-CM

## 2024-08-16 DIAGNOSIS — I10 PRIMARY HYPERTENSION: ICD-10-CM

## 2024-08-16 DIAGNOSIS — E11.65 TYPE 2 DIABETES MELLITUS WITH HYPERGLYCEMIA, WITHOUT LONG-TERM CURRENT USE OF INSULIN (HCC): ICD-10-CM

## 2024-08-16 DIAGNOSIS — D72.829 LEUKOCYTOSIS, UNSPECIFIED TYPE: ICD-10-CM

## 2024-08-16 LAB
ALBUMIN SERPL-MCNC: 3.9 G/DL (ref 3.5–4.6)
ALP SERPL-CCNC: 96 U/L (ref 40–130)
ALT SERPL-CCNC: 59 U/L (ref 0–33)
ANION GAP SERPL CALCULATED.3IONS-SCNC: 9 MEQ/L (ref 9–15)
AST SERPL-CCNC: 31 U/L (ref 0–35)
BASOPHILS # BLD: 0.1 K/UL (ref 0–0.2)
BASOPHILS NFR BLD: 0.5 %
BILIRUB SERPL-MCNC: 0.4 MG/DL (ref 0.2–0.7)
BUN SERPL-MCNC: 13 MG/DL (ref 6–20)
CALCIUM SERPL-MCNC: 9.7 MG/DL (ref 8.5–9.9)
CHLORIDE SERPL-SCNC: 106 MEQ/L (ref 95–107)
CHOLEST SERPL-MCNC: 172 MG/DL (ref 0–199)
CO2 SERPL-SCNC: 25 MEQ/L (ref 20–31)
CREAT SERPL-MCNC: 1.1 MG/DL (ref 0.5–0.9)
CREAT UR-MCNC: 117.5 MG/DL
EOSINOPHIL # BLD: 0.6 K/UL (ref 0–0.7)
EOSINOPHIL NFR BLD: 4.6 %
ERYTHROCYTE [DISTWIDTH] IN BLOOD BY AUTOMATED COUNT: 13.2 % (ref 11.5–14.5)
ESTIMATED AVERAGE GLUCOSE: 151 MG/DL
GLOBULIN SER CALC-MCNC: 3.4 G/DL (ref 2.3–3.5)
GLUCOSE SERPL-MCNC: 121 MG/DL (ref 70–99)
HBA1C MFR BLD: 6.9 % (ref 4–6)
HCT VFR BLD AUTO: 51 % (ref 37–47)
HDLC SERPL-MCNC: 48 MG/DL (ref 40–59)
HGB BLD-MCNC: 16.4 G/DL (ref 12–16)
LDLC SERPL CALC-MCNC: 105 MG/DL (ref 0–129)
LYMPHOCYTES # BLD: 2.4 K/UL (ref 1–4.8)
LYMPHOCYTES NFR BLD: 19.6 %
MCH RBC QN AUTO: 31.6 PG (ref 27–31.3)
MCHC RBC AUTO-ENTMCNC: 32.2 % (ref 33–37)
MCV RBC AUTO: 98.3 FL (ref 79.4–94.8)
MICROALBUMIN UR-MCNC: 9.8 MG/DL
MICROALBUMIN/CREAT UR-RTO: 83.4 MG/G (ref 0–30)
MONOCYTES # BLD: 1.1 K/UL (ref 0.2–0.8)
MONOCYTES NFR BLD: 8.8 %
NEUTROPHILS # BLD: 8 K/UL (ref 1.4–6.5)
NEUTS SEG NFR BLD: 65.8 %
PLATELET # BLD AUTO: 267 K/UL (ref 130–400)
POTASSIUM SERPL-SCNC: 4 MEQ/L (ref 3.4–4.9)
PROT SERPL-MCNC: 7.3 G/DL (ref 6.3–8)
RBC # BLD AUTO: 5.19 M/UL (ref 4.2–5.4)
SODIUM SERPL-SCNC: 140 MEQ/L (ref 135–144)
TRIGL SERPL-MCNC: 93 MG/DL (ref 0–150)
WBC # BLD AUTO: 12.1 K/UL (ref 4.8–10.8)

## 2024-08-18 DIAGNOSIS — G47.33 OSA (OBSTRUCTIVE SLEEP APNEA): Primary | ICD-10-CM

## 2024-08-18 NOTE — RESULT ENCOUNTER NOTE
CPAP titration results reviewed.  Please help patient arrange AutoPap with settings of 5-10 cm H20 pressure and suggested mask.  Please also help patient establish care with sleep medicine office, I have placed referral in her chart.  Patient should be seen by the specialist in 1 to 2 months to review AutoPap machine data and discuss any further pressure setting adjustments.

## 2024-08-19 DIAGNOSIS — D75.1 POLYCYTHEMIA: ICD-10-CM

## 2024-08-19 DIAGNOSIS — D72.829 LEUKOCYTOSIS, UNSPECIFIED TYPE: Primary | ICD-10-CM

## 2024-08-19 PROBLEM — E11.29 MICROALBUMINURIA DUE TO TYPE 2 DIABETES MELLITUS (HCC): Status: ACTIVE | Noted: 2024-08-19

## 2024-08-19 PROBLEM — R80.9 MICROALBUMINURIA DUE TO TYPE 2 DIABETES MELLITUS (HCC): Status: ACTIVE | Noted: 2024-08-19

## 2024-08-19 NOTE — RESULT ENCOUNTER NOTE
Please contact patient to instruct her to follow-up with sleep medicine and give her contact information to schedule visit with the specialist.

## 2024-08-23 ENCOUNTER — OFFICE VISIT (OUTPATIENT)
Dept: FAMILY MEDICINE CLINIC | Age: 54
End: 2024-08-23
Payer: COMMERCIAL

## 2024-08-23 VITALS
OXYGEN SATURATION: 97 % | HEART RATE: 51 BPM | DIASTOLIC BLOOD PRESSURE: 70 MMHG | BODY MASS INDEX: 55.72 KG/M2 | WEIGHT: 276.4 LBS | SYSTOLIC BLOOD PRESSURE: 106 MMHG | HEIGHT: 59 IN | TEMPERATURE: 97.5 F

## 2024-08-23 DIAGNOSIS — G47.33 OSA (OBSTRUCTIVE SLEEP APNEA): ICD-10-CM

## 2024-08-23 DIAGNOSIS — E11.29 MICROALBUMINURIA DUE TO TYPE 2 DIABETES MELLITUS (HCC): ICD-10-CM

## 2024-08-23 DIAGNOSIS — L30.4 INTERTRIGO: ICD-10-CM

## 2024-08-23 DIAGNOSIS — D72.829 LEUKOCYTOSIS, UNSPECIFIED TYPE: ICD-10-CM

## 2024-08-23 DIAGNOSIS — I10 PRIMARY HYPERTENSION: ICD-10-CM

## 2024-08-23 DIAGNOSIS — R80.9 MICROALBUMINURIA DUE TO TYPE 2 DIABETES MELLITUS (HCC): ICD-10-CM

## 2024-08-23 DIAGNOSIS — E11.65 TYPE 2 DIABETES MELLITUS WITH HYPERGLYCEMIA, WITHOUT LONG-TERM CURRENT USE OF INSULIN (HCC): Primary | ICD-10-CM

## 2024-08-23 DIAGNOSIS — E66.01 MORBID OBESITY (HCC): ICD-10-CM

## 2024-08-23 DIAGNOSIS — D75.1 POLYCYTHEMIA: ICD-10-CM

## 2024-08-23 PROCEDURE — 3074F SYST BP LT 130 MM HG: CPT | Performed by: FAMILY MEDICINE

## 2024-08-23 PROCEDURE — 99214 OFFICE O/P EST MOD 30 MIN: CPT | Performed by: FAMILY MEDICINE

## 2024-08-23 PROCEDURE — 3044F HG A1C LEVEL LT 7.0%: CPT | Performed by: FAMILY MEDICINE

## 2024-08-23 PROCEDURE — 3078F DIAST BP <80 MM HG: CPT | Performed by: FAMILY MEDICINE

## 2024-08-23 RX ORDER — VITAMIN A ACETATE, BETA CAROTENE, ASCORBIC ACID, CHOLECALCIFEROL, .ALPHA.-TOCOPHEROL ACETATE, DL-, THIAMINE MONONITRATE, RIBOFLAVIN, NIACINAMIDE, PYRIDOXINE HYDROCHLORIDE, FOLIC ACID, CYANOCOBALAMIN, CALCIUM CARBONATE, FERROUS FUMARATE, ZINC OXIDE, CUPRIC OXIDE 3080; 12; 120; 400; 1; 1.84; 3; 20; 22; 920; 25; 200; 27; 10; 2 [IU]/1; UG/1; MG/1; [IU]/1; MG/1; MG/1; MG/1; MG/1; MG/1; [IU]/1; MG/1; MG/1; MG/1; MG/1; MG/1
1 TABLET, FILM COATED ORAL DAILY
Qty: 30 TABLET | Refills: 5 | Status: CANCELLED | OUTPATIENT
Start: 2024-08-23

## 2024-08-23 RX ORDER — POLYETHYLENE GLYCOL-3350 AND ELECTROLYTES 236; 6.74; 5.86; 2.97; 22.74 G/274.31G; G/274.31G; G/274.31G; G/274.31G; G/274.31G
POWDER, FOR SOLUTION ORAL
COMMUNITY
Start: 2024-06-13

## 2024-08-23 RX ORDER — NYSTATIN 100000 [USP'U]/G
POWDER TOPICAL
Qty: 60 G | Refills: 1 | Status: SHIPPED | OUTPATIENT
Start: 2024-08-23

## 2024-08-23 RX ORDER — SEMAGLUTIDE 1.34 MG/ML
1 INJECTION, SOLUTION SUBCUTANEOUS
Qty: 9 ML | Refills: 1 | Status: SHIPPED | OUTPATIENT
Start: 2024-08-23

## 2024-08-23 ASSESSMENT — ENCOUNTER SYMPTOMS
SHORTNESS OF BREATH: 0
ANAL BLEEDING: 0
ABDOMINAL DISTENTION: 0
CHEST TIGHTNESS: 0
CONSTIPATION: 0
BLOOD IN STOOL: 0
VOMITING: 0
DIARRHEA: 0
WHEEZING: 0
ABDOMINAL PAIN: 0
NAUSEA: 0
COUGH: 0

## 2024-08-23 NOTE — ASSESSMENT & PLAN NOTE
Patient now has CPAP machine at home and was encouraged to use machine at prescribed settings on a nightly basis.  We will continue to follow over time

## 2024-08-23 NOTE — ASSESSMENT & PLAN NOTE
Noted on recent microalbumin testing.  I reviewed with patient the importance of tight glucose, blood pressure, and cholesterol control to help with preserving renal function long-term.  We will continue to follow testing over time to monitor.

## 2024-08-23 NOTE — ASSESSMENT & PLAN NOTE
Most recent A1c now at goal control.  Will increase semaglutide dosing to help with further glucose reduction and weight loss in the setting of morbid obesity.  Patient encouraged to continue making best efforts to eat a lower carbohydrate and lower saturated fat diet.

## 2024-08-23 NOTE — PROGRESS NOTES
Lakshmi Rebolledo (: 1970) is a 54 y.o. female, Established patient, who presents today for:    Chief Complaint   Patient presents with    3 Month Follow-Up    Diabetes     Sugars are going anywhere between 130s and 160s. Pt has not had her Ozempic, has not had med in 2 wks. Had to stop for surgery. This AM BS was 246. Asking for adjustment and refills     Orders     Pt asking about the Don machine, would like to how to get BS under controls.     Rash     Under breasts, can we prescribe anything?         Assessment & Plan     1. Type 2 diabetes mellitus with hyperglycemia, without long-term current use of insulin (HCC)  Assessment & Plan:  Most recent A1c now at goal control.  Will increase semaglutide dosing to help with further glucose reduction and weight loss in the setting of morbid obesity.  Patient encouraged to continue making best efforts to eat a lower carbohydrate and lower saturated fat diet.  Orders:  -     Semaglutide, 1 MG/DOSE, (OZEMPIC, 1 MG/DOSE,) 4 MG/3ML SOPN sc injection; Inject 1 mg into the skin every 7 days, Disp-9 mL, R-1Normal  -     Hemoglobin A1C; Future  -     Comprehensive Metabolic Panel; Future  -     Lipid Panel; Future  -      DIABETES FOOT EXAM  2. Microalbuminuria due to type 2 diabetes mellitus (HCC)  Assessment & Plan:  Noted on recent microalbumin testing.  I reviewed with patient the importance of tight glucose, blood pressure, and cholesterol control to help with preserving renal function long-term.  We will continue to follow testing over time to monitor.    Orders:  -     Hemoglobin A1C; Future  -     Comprehensive Metabolic Panel; Future  -     Lipid Panel; Future  -      DIABETES FOOT EXAM  3. Primary hypertension  Assessment & Plan:  Blood pressure within normal limits today in the office.  Patient instructed to continue with current dose of losartan.    Orders:  -     Comprehensive Metabolic Panel; Future  4. ALYSHA (obstructive sleep apnea)  Assessment &

## 2024-08-28 ENCOUNTER — OFFICE VISIT (OUTPATIENT)
Dept: BARIATRICS/WEIGHT MGMT | Age: 54
End: 2024-08-28
Payer: COMMERCIAL

## 2024-08-28 VITALS — WEIGHT: 278 LBS | HEIGHT: 60 IN | BODY MASS INDEX: 54.58 KG/M2

## 2024-08-28 VITALS — HEIGHT: 60 IN | WEIGHT: 278 LBS | BODY MASS INDEX: 54.58 KG/M2

## 2024-08-28 DIAGNOSIS — E66.01 CLASS 3 SEVERE OBESITY DUE TO EXCESS CALORIES WITH SERIOUS COMORBIDITY AND BODY MASS INDEX (BMI) OF 50.0 TO 59.9 IN ADULT (HCC): ICD-10-CM

## 2024-08-28 DIAGNOSIS — E66.01 PSYCHOLOGICAL FACTORS AFFECTING MORBID OBESITY (HCC): ICD-10-CM

## 2024-08-28 DIAGNOSIS — F54 PSYCHOLOGICAL FACTORS AFFECTING MORBID OBESITY (HCC): ICD-10-CM

## 2024-08-28 DIAGNOSIS — E11.65 TYPE 2 DIABETES MELLITUS WITH HYPERGLYCEMIA, WITHOUT LONG-TERM CURRENT USE OF INSULIN (HCC): ICD-10-CM

## 2024-08-28 DIAGNOSIS — G89.29 CHRONIC PAIN OF BOTH KNEES: ICD-10-CM

## 2024-08-28 DIAGNOSIS — E66.01 CLASS 3 SEVERE OBESITY DUE TO EXCESS CALORIES WITH SERIOUS COMORBIDITY AND BODY MASS INDEX (BMI) OF 50.0 TO 59.9 IN ADULT: ICD-10-CM

## 2024-08-28 DIAGNOSIS — F54 PSYCHOLOGICAL FACTORS AFFECTING MORBID OBESITY: ICD-10-CM

## 2024-08-28 DIAGNOSIS — Z71.89 ENCOUNTER FOR PSYCHOLOGICAL ASSESSMENT PRIOR TO BARIATRIC SURGERY: ICD-10-CM

## 2024-08-28 DIAGNOSIS — F17.200 NICOTINE USE DISORDER: ICD-10-CM

## 2024-08-28 DIAGNOSIS — M54.50 CHRONIC MIDLINE LOW BACK PAIN WITHOUT SCIATICA: ICD-10-CM

## 2024-08-28 DIAGNOSIS — E11.65 TYPE 2 DIABETES MELLITUS WITH HYPERGLYCEMIA, WITHOUT LONG-TERM CURRENT USE OF INSULIN (HCC): Primary | ICD-10-CM

## 2024-08-28 DIAGNOSIS — G89.29 CHRONIC MIDLINE LOW BACK PAIN WITHOUT SCIATICA: ICD-10-CM

## 2024-08-28 DIAGNOSIS — F43.10 PTSD (POST-TRAUMATIC STRESS DISORDER): ICD-10-CM

## 2024-08-28 DIAGNOSIS — I10 PRIMARY HYPERTENSION: ICD-10-CM

## 2024-08-28 DIAGNOSIS — G47.33 OSA (OBSTRUCTIVE SLEEP APNEA): ICD-10-CM

## 2024-08-28 DIAGNOSIS — E66.01 MORBID OBESITY (HCC): ICD-10-CM

## 2024-08-28 DIAGNOSIS — Z71.3 DIETARY COUNSELING AND SURVEILLANCE: ICD-10-CM

## 2024-08-28 DIAGNOSIS — M47.816 LUMBAR SPONDYLOSIS: ICD-10-CM

## 2024-08-28 DIAGNOSIS — M25.561 CHRONIC PAIN OF BOTH KNEES: ICD-10-CM

## 2024-08-28 DIAGNOSIS — M25.562 CHRONIC PAIN OF BOTH KNEES: ICD-10-CM

## 2024-08-28 DIAGNOSIS — E66.01 PSYCHOLOGICAL FACTORS AFFECTING MORBID OBESITY: ICD-10-CM

## 2024-08-28 DIAGNOSIS — K20.90 ESOPHAGITIS DETERMINED BY ENDOSCOPY: ICD-10-CM

## 2024-08-28 DIAGNOSIS — F39 UNSPECIFIED MOOD (AFFECTIVE) DISORDER (HCC): ICD-10-CM

## 2024-08-28 DIAGNOSIS — Z71.89 ENCOUNTER FOR PSYCHOLOGICAL ASSESSMENT PRIOR TO BARIATRIC SURGERY: Primary | ICD-10-CM

## 2024-08-28 PROCEDURE — 3044F HG A1C LEVEL LT 7.0%: CPT | Performed by: DIETITIAN, REGISTERED

## 2024-08-28 PROCEDURE — 97803 MED NUTRITION INDIV SUBSEQ: CPT | Performed by: DIETITIAN, REGISTERED

## 2024-08-28 PROCEDURE — 90791 PSYCH DIAGNOSTIC EVALUATION: CPT | Performed by: PSYCHOLOGIST

## 2024-08-28 ASSESSMENT — PATIENT HEALTH QUESTIONNAIRE - PHQ9
5. POOR APPETITE OR OVEREATING: SEVERAL DAYS
SUM OF ALL RESPONSES TO PHQ QUESTIONS 1-9: 12
SUM OF ALL RESPONSES TO PHQ9 QUESTIONS 1 & 2: 4
SUM OF ALL RESPONSES TO PHQ QUESTIONS 1-9: 12
9. THOUGHTS THAT YOU WOULD BE BETTER OFF DEAD, OR OF HURTING YOURSELF: NOT AT ALL
1. LITTLE INTEREST OR PLEASURE IN DOING THINGS: MORE THAN HALF THE DAYS
SUM OF ALL RESPONSES TO PHQ QUESTIONS 1-9: 12
10. IF YOU CHECKED OFF ANY PROBLEMS, HOW DIFFICULT HAVE THESE PROBLEMS MADE IT FOR YOU TO DO YOUR WORK, TAKE CARE OF THINGS AT HOME, OR GET ALONG WITH OTHER PEOPLE: SOMEWHAT DIFFICULT
6. FEELING BAD ABOUT YOURSELF - OR THAT YOU ARE A FAILURE OR HAVE LET YOURSELF OR YOUR FAMILY DOWN: MORE THAN HALF THE DAYS
4. FEELING TIRED OR HAVING LITTLE ENERGY: MORE THAN HALF THE DAYS
SUM OF ALL RESPONSES TO PHQ QUESTIONS 1-9: 12
7. TROUBLE CONCENTRATING ON THINGS, SUCH AS READING THE NEWSPAPER OR WATCHING TELEVISION: SEVERAL DAYS
3. TROUBLE FALLING OR STAYING ASLEEP: MORE THAN HALF THE DAYS
8. MOVING OR SPEAKING SO SLOWLY THAT OTHER PEOPLE COULD HAVE NOTICED. OR THE OPPOSITE, BEING SO FIGETY OR RESTLESS THAT YOU HAVE BEEN MOVING AROUND A LOT MORE THAN USUAL: NOT AT ALL
2. FEELING DOWN, DEPRESSED OR HOPELESS: MORE THAN HALF THE DAYS

## 2024-08-28 ASSESSMENT — ANXIETY QUESTIONNAIRES
7. FEELING AFRAID AS IF SOMETHING AWFUL MIGHT HAPPEN: SEVERAL DAYS
1. FEELING NERVOUS, ANXIOUS, OR ON EDGE: SEVERAL DAYS
3. WORRYING TOO MUCH ABOUT DIFFERENT THINGS: NEARLY EVERY DAY
IF YOU CHECKED OFF ANY PROBLEMS ON THIS QUESTIONNAIRE, HOW DIFFICULT HAVE THESE PROBLEMS MADE IT FOR YOU TO DO YOUR WORK, TAKE CARE OF THINGS AT HOME, OR GET ALONG WITH OTHER PEOPLE: SOMEWHAT DIFFICULT
2. NOT BEING ABLE TO STOP OR CONTROL WORRYING: NEARLY EVERY DAY
4. TROUBLE RELAXING: MORE THAN HALF THE DAYS
GAD7 TOTAL SCORE: 13
5. BEING SO RESTLESS THAT IT IS HARD TO SIT STILL: SEVERAL DAYS
6. BECOMING EASILY ANNOYED OR IRRITABLE: MORE THAN HALF THE DAYS

## 2024-08-28 NOTE — PROGRESS NOTES
Nutrition follow up prior to surgery  Visit number:  3 out of 6 for Joelle en Y gastric bypass.    Initial Weight: 285 lbs    Wt Readings from Last 3 Encounters:   08/28/24 126.1 kg (278 lb)   08/28/24 126.1 kg (278 lb)   08/23/24 125.4 kg (276 lb 6.4 oz)     lost 7 lbs over 1 month, down 7 lbs from initial.    Previous nutrition goals:   Begin taking Bariatric MVI with iron and Calcium citrate 500-600mg twice daily  Sample protein shakes  Read labels for leaner proteins, < 5 grams fat /serving  Keep daily food journal    Nutrition goals: partially met, hasn't been able to purchase vitamins but has been looking for ones she can afford. Continues to eat 3-5 times/day although reports a week of high stress and \"couldn't process food.\"  Patient continues to drink mostly water and  liquids from solids.  Walking daily up/down driveway. Patient has remained nicotine free since July 4th, noted nicotine test positive 7/29.      PES Statement:  Overweight/Obesity related to a complex combination of decreased energy needs, disordered eating patterns, physical inactivity, and increased psychological/life stress as evidenced by BMI greater than 30 and inability to maintain a significant amount of weight loss through conventional weight loss interventions.    Intervention:  Reviewed previous goals and set new goals.  Stressed importance of preoperative weight loss or surgery may be postponed or cancelled.  Provided continued education regarding diet and lifestyle changes for optimal success post bariatric surgery.  Encouragement and support provided.    Education materials provided:   Vitamin assistance handouts    Plan/Recommendations:   Continue current intake  Look into vitamin assistance programs  Continue to increase daily walking  Encourage tracking daily intake    Follow up: 4 weeks     Total time involved in direct patient education: 30 minutes    Candice Rodriguez RD

## 2024-09-01 LAB
ANABASINE UR-MCNC: <5 NG/ML
COTININE UR-MCNC: 61 NG/ML
NICOTINE UR-MCNC: 42 NG/ML
TRANS-3-OH-COTININE UR-MCNC: 300 NG/ML

## 2024-09-06 DIAGNOSIS — I10 PRIMARY HYPERTENSION: ICD-10-CM

## 2024-09-06 RX ORDER — TOPIRAMATE 50 MG/1
50 TABLET, FILM COATED ORAL 2 TIMES DAILY
Qty: 60 TABLET | Refills: 0 | Status: SHIPPED | OUTPATIENT
Start: 2024-09-06

## 2024-09-06 RX ORDER — ONDANSETRON 4 MG/1
4 TABLET, FILM COATED ORAL DAILY PRN
Qty: 30 TABLET | Refills: 0 | Status: SHIPPED | OUTPATIENT
Start: 2024-09-06

## 2024-09-06 NOTE — TELEPHONE ENCOUNTER
Patient requesting medication refill. Please approve or deny this request.    Rx requested:  Requested Prescriptions     Pending Prescriptions Disp Refills    topiramate (TOPAMAX) 50 MG tablet 60 tablet 3     Sig: Take 1 tablet by mouth 2 times daily    ondansetron (ZOFRAN) 4 MG tablet 30 tablet 0     Sig: Take 1 tablet by mouth daily as needed for Nausea or Vomiting         Last Office Visit:   6/13/2024      Next Visit Date:  Future Appointments   Date Time Provider Department Center   9/9/2024  8:00 AM [place] CT ROOM 2 [facility] CT [facility] Fac RAD   9/10/2024  9:00 AM Ta Guzman DO [facility]   9/11/2024  2:00 PM Gabi Pack MD [facility]   9/24/2024  9:00 AM Candice Rodriguez RD [facility]   9/24/2024  9:30 AM Michel Stuart PSYD [facility]   11/20/2024  [facility]   11/20/2024  9:00 AM [place] [facility] ULTRA [facility] Fac RAD   11/25/2024 10:30 AM Mathieu Silvestre MD [facility] DE[facility]

## 2024-09-06 NOTE — TELEPHONE ENCOUNTER
Pt is requesting medication refill. Please approve or deny this request.    Rx requested:  Requested Prescriptions     Pending Prescriptions Disp Refills    losartan (COZAAR) 25 MG tablet 90 tablet 1     Sig: Take 1 tablet by mouth daily         Last Office Visit:   8/23/2024      Next Visit Date:  Future Appointments   Date Time Provider Department Center   9/9/2024  8:00 AM [place] CT ROOM 2 [facility]Z CT [facility] Fac RAD   9/11/2024  2:00 PM Gabi Pack MD [facility]   9/24/2024  9:00 AM Candice Rodriguez RD [facility]   9/24/2024  9:30 AM Michel Stuart PSYD [facility]   11/20/2024  8:00 AM [place] MAMMO ROOM 1 [facility] WOM[facility] Fac RAD   11/20/2024  9:00 AM [facility] ULTRA [facility] Fac RAD   11/25/2024 10:30 AM Mathieu Silvestre MD [facility] DEP

## 2024-09-08 DIAGNOSIS — R11.0 NAUSEA: ICD-10-CM

## 2024-09-08 DIAGNOSIS — M47.816 LUMBAR SPONDYLOSIS: ICD-10-CM

## 2024-09-08 DIAGNOSIS — R68.81 EARLY SATIETY: ICD-10-CM

## 2024-09-08 DIAGNOSIS — R10.13 EPIGASTRIC DISCOMFORT: ICD-10-CM

## 2024-09-08 DIAGNOSIS — M25.561 CHRONIC PAIN OF BOTH KNEES: ICD-10-CM

## 2024-09-08 DIAGNOSIS — G89.29 CHRONIC PAIN OF BOTH KNEES: ICD-10-CM

## 2024-09-08 DIAGNOSIS — M25.562 CHRONIC PAIN OF BOTH KNEES: ICD-10-CM

## 2024-09-08 DIAGNOSIS — R12 HEARTBURN: ICD-10-CM

## 2024-09-08 RX ORDER — LOSARTAN POTASSIUM 25 MG/1
25 TABLET ORAL DAILY
Qty: 90 TABLET | Refills: 1 | Status: SHIPPED | OUTPATIENT
Start: 2024-09-08

## 2024-09-09 ENCOUNTER — HOSPITAL ENCOUNTER (OUTPATIENT)
Dept: CT IMAGING | Age: 54
Discharge: HOME | End: 2024-09-11
Attending: INTERNAL MEDICINE
Payer: COMMERCIAL

## 2024-09-09 DIAGNOSIS — M54.50 CHRONIC MIDLINE LOW BACK PAIN WITHOUT SCIATICA: Primary | ICD-10-CM

## 2024-09-09 DIAGNOSIS — G89.29 CHRONIC MIDLINE LOW BACK PAIN WITHOUT SCIATICA: Primary | ICD-10-CM

## 2024-09-09 DIAGNOSIS — M25.561 CHRONIC PAIN OF BOTH KNEES: ICD-10-CM

## 2024-09-09 DIAGNOSIS — M47.816 LUMBAR SPONDYLOSIS: ICD-10-CM

## 2024-09-09 DIAGNOSIS — E66.01 MORBID OBESITY (HCC): ICD-10-CM

## 2024-09-09 DIAGNOSIS — G89.29 CHRONIC PAIN OF BOTH KNEES: ICD-10-CM

## 2024-09-09 DIAGNOSIS — J98.4 RESTRICTIVE LUNG DISEASE: ICD-10-CM

## 2024-09-09 DIAGNOSIS — Z87.891 PERSONAL HISTORY OF TOBACCO USE: ICD-10-CM

## 2024-09-09 DIAGNOSIS — M25.562 CHRONIC PAIN OF BOTH KNEES: ICD-10-CM

## 2024-09-09 PROCEDURE — 71271 CT THORAX LUNG CANCER SCR C-: CPT

## 2024-09-09 RX ORDER — DULOXETIN HYDROCHLORIDE 30 MG/1
30 CAPSULE, DELAYED RELEASE ORAL DAILY
Qty: 30 CAPSULE | Refills: 3 | Status: SHIPPED | OUTPATIENT
Start: 2024-09-09

## 2024-09-09 RX ORDER — PANTOPRAZOLE SODIUM 40 MG/1
40 TABLET, DELAYED RELEASE ORAL
Qty: 90 TABLET | Refills: 1 | Status: SHIPPED | OUTPATIENT
Start: 2024-09-09

## 2024-09-10 ENCOUNTER — OFFICE VISIT (OUTPATIENT)
Age: 54
End: 2024-09-10
Payer: COMMERCIAL

## 2024-09-10 VITALS
HEIGHT: 59 IN | BODY MASS INDEX: 56.04 KG/M2 | WEIGHT: 278 LBS | TEMPERATURE: 96.9 F | SYSTOLIC BLOOD PRESSURE: 130 MMHG | DIASTOLIC BLOOD PRESSURE: 70 MMHG

## 2024-09-10 DIAGNOSIS — G89.29 CHRONIC PAIN OF BOTH KNEES: ICD-10-CM

## 2024-09-10 DIAGNOSIS — M47.816 LUMBAR SPONDYLOSIS: Primary | ICD-10-CM

## 2024-09-10 DIAGNOSIS — M25.562 CHRONIC PAIN OF BOTH KNEES: ICD-10-CM

## 2024-09-10 DIAGNOSIS — M25.561 CHRONIC PAIN OF BOTH KNEES: ICD-10-CM

## 2024-09-10 PROCEDURE — 99214 OFFICE O/P EST MOD 30 MIN: CPT | Performed by: STUDENT IN AN ORGANIZED HEALTH CARE EDUCATION/TRAINING PROGRAM

## 2024-09-10 PROCEDURE — 3078F DIAST BP <80 MM HG: CPT | Performed by: STUDENT IN AN ORGANIZED HEALTH CARE EDUCATION/TRAINING PROGRAM

## 2024-09-10 PROCEDURE — 3075F SYST BP GE 130 - 139MM HG: CPT | Performed by: STUDENT IN AN ORGANIZED HEALTH CARE EDUCATION/TRAINING PROGRAM

## 2024-09-10 RX ORDER — TRAMADOL HYDROCHLORIDE 50 MG/1
50 TABLET ORAL DAILY PRN
Qty: 30 TABLET | Refills: 0 | Status: SHIPPED | OUTPATIENT
Start: 2024-09-10 | End: 2024-10-10

## 2024-09-10 RX ORDER — GABAPENTIN 300 MG/1
300 CAPSULE ORAL 3 TIMES DAILY
Qty: 90 CAPSULE | Refills: 1 | Status: SHIPPED | OUTPATIENT
Start: 2024-09-10 | End: 2024-11-09

## 2024-09-10 ASSESSMENT — ENCOUNTER SYMPTOMS: BACK PAIN: 1

## 2024-09-11 ENCOUNTER — OFFICE VISIT (OUTPATIENT)
Dept: PULMONOLOGY | Age: 54
End: 2024-09-11
Payer: COMMERCIAL

## 2024-09-11 VITALS
OXYGEN SATURATION: 95 % | HEIGHT: 59 IN | RESPIRATION RATE: 16 BRPM | TEMPERATURE: 97.5 F | HEART RATE: 90 BPM | SYSTOLIC BLOOD PRESSURE: 130 MMHG | WEIGHT: 273.4 LBS | BODY MASS INDEX: 55.12 KG/M2 | DIASTOLIC BLOOD PRESSURE: 82 MMHG

## 2024-09-11 DIAGNOSIS — E66.01 MORBID OBESITY WITH BMI OF 60.0-69.9, ADULT (HCC): ICD-10-CM

## 2024-09-11 DIAGNOSIS — Z87.891 PERSONAL HISTORY OF TOBACCO USE: ICD-10-CM

## 2024-09-11 DIAGNOSIS — G47.33 OSA (OBSTRUCTIVE SLEEP APNEA): Primary | ICD-10-CM

## 2024-09-11 DIAGNOSIS — I27.20 PULMONARY HYPERTENSION (HCC): ICD-10-CM

## 2024-09-11 DIAGNOSIS — I51.89 DIASTOLIC DYSFUNCTION: ICD-10-CM

## 2024-09-11 DIAGNOSIS — J98.4 RESTRICTIVE LUNG DISEASE: ICD-10-CM

## 2024-09-11 DIAGNOSIS — R06.09 DYSPNEA ON EXERTION: ICD-10-CM

## 2024-09-11 PROCEDURE — 3075F SYST BP GE 130 - 139MM HG: CPT | Performed by: INTERNAL MEDICINE

## 2024-09-11 PROCEDURE — 3079F DIAST BP 80-89 MM HG: CPT | Performed by: INTERNAL MEDICINE

## 2024-09-11 PROCEDURE — 99214 OFFICE O/P EST MOD 30 MIN: CPT | Performed by: INTERNAL MEDICINE

## 2024-09-23 ENCOUNTER — PREP FOR PROCEDURE (OUTPATIENT)
Age: 54
End: 2024-09-23

## 2024-09-24 ENCOUNTER — OFFICE VISIT (OUTPATIENT)
Dept: BARIATRICS/WEIGHT MGMT | Age: 54
End: 2024-09-24
Payer: COMMERCIAL

## 2024-09-24 ENCOUNTER — OFFICE VISIT (OUTPATIENT)
Dept: BARIATRICS/WEIGHT MGMT | Age: 54
End: 2024-09-24

## 2024-09-24 VITALS — HEIGHT: 60 IN | BODY MASS INDEX: 53.6 KG/M2 | WEIGHT: 273 LBS

## 2024-09-24 DIAGNOSIS — E66.01 PSYCHOLOGICAL FACTORS AFFECTING MORBID OBESITY: ICD-10-CM

## 2024-09-24 DIAGNOSIS — I10 PRIMARY HYPERTENSION: ICD-10-CM

## 2024-09-24 DIAGNOSIS — G89.29 CHRONIC MIDLINE LOW BACK PAIN WITHOUT SCIATICA: ICD-10-CM

## 2024-09-24 DIAGNOSIS — E66.01 PSYCHOLOGICAL FACTORS AFFECTING MORBID OBESITY: Primary | ICD-10-CM

## 2024-09-24 DIAGNOSIS — E66.01 MORBID OBESITY: ICD-10-CM

## 2024-09-24 DIAGNOSIS — F17.200 NICOTINE USE DISORDER: ICD-10-CM

## 2024-09-24 DIAGNOSIS — E11.65 TYPE 2 DIABETES MELLITUS WITH HYPERGLYCEMIA, WITHOUT LONG-TERM CURRENT USE OF INSULIN (HCC): ICD-10-CM

## 2024-09-24 DIAGNOSIS — M25.562 CHRONIC PAIN OF BOTH KNEES: ICD-10-CM

## 2024-09-24 DIAGNOSIS — E11.65 TYPE 2 DIABETES MELLITUS WITH HYPERGLYCEMIA, WITHOUT LONG-TERM CURRENT USE OF INSULIN (HCC): Primary | ICD-10-CM

## 2024-09-24 DIAGNOSIS — F39 UNSPECIFIED MOOD (AFFECTIVE) DISORDER (HCC): ICD-10-CM

## 2024-09-24 DIAGNOSIS — F54 PSYCHOLOGICAL FACTORS AFFECTING MORBID OBESITY: Primary | ICD-10-CM

## 2024-09-24 DIAGNOSIS — E66.01 CLASS 3 SEVERE OBESITY DUE TO EXCESS CALORIES WITH SERIOUS COMORBIDITY AND BODY MASS INDEX (BMI) OF 50.0 TO 59.9 IN ADULT: ICD-10-CM

## 2024-09-24 DIAGNOSIS — F43.10 PTSD (POST-TRAUMATIC STRESS DISORDER): ICD-10-CM

## 2024-09-24 DIAGNOSIS — M25.561 CHRONIC PAIN OF BOTH KNEES: ICD-10-CM

## 2024-09-24 DIAGNOSIS — M54.50 CHRONIC MIDLINE LOW BACK PAIN WITHOUT SCIATICA: ICD-10-CM

## 2024-09-24 DIAGNOSIS — E66.813 CLASS 3 SEVERE OBESITY DUE TO EXCESS CALORIES WITH SERIOUS COMORBIDITY AND BODY MASS INDEX (BMI) OF 50.0 TO 59.9 IN ADULT: ICD-10-CM

## 2024-09-24 DIAGNOSIS — K20.90 ESOPHAGITIS DETERMINED BY ENDOSCOPY: ICD-10-CM

## 2024-09-24 DIAGNOSIS — G89.29 CHRONIC PAIN OF BOTH KNEES: ICD-10-CM

## 2024-09-24 DIAGNOSIS — G47.33 OSA (OBSTRUCTIVE SLEEP APNEA): ICD-10-CM

## 2024-09-24 DIAGNOSIS — Z71.3 DIETARY COUNSELING AND SURVEILLANCE: ICD-10-CM

## 2024-09-24 DIAGNOSIS — M47.816 LUMBAR SPONDYLOSIS: ICD-10-CM

## 2024-09-24 DIAGNOSIS — F54 PSYCHOLOGICAL FACTORS AFFECTING MORBID OBESITY: ICD-10-CM

## 2024-09-24 PROCEDURE — 3044F HG A1C LEVEL LT 7.0%: CPT | Performed by: DIETITIAN, REGISTERED

## 2024-09-24 PROCEDURE — 97803 MED NUTRITION INDIV SUBSEQ: CPT | Performed by: DIETITIAN, REGISTERED

## 2024-09-24 ASSESSMENT — ANXIETY QUESTIONNAIRES
IF YOU CHECKED OFF ANY PROBLEMS ON THIS QUESTIONNAIRE, HOW DIFFICULT HAVE THESE PROBLEMS MADE IT FOR YOU TO DO YOUR WORK, TAKE CARE OF THINGS AT HOME, OR GET ALONG WITH OTHER PEOPLE: SOMEWHAT DIFFICULT
5. BEING SO RESTLESS THAT IT IS HARD TO SIT STILL: NOT AT ALL
6. BECOMING EASILY ANNOYED OR IRRITABLE: SEVERAL DAYS
3. WORRYING TOO MUCH ABOUT DIFFERENT THINGS: SEVERAL DAYS
GAD7 TOTAL SCORE: 5
1. FEELING NERVOUS, ANXIOUS, OR ON EDGE: SEVERAL DAYS
4. TROUBLE RELAXING: SEVERAL DAYS
2. NOT BEING ABLE TO STOP OR CONTROL WORRYING: SEVERAL DAYS
7. FEELING AFRAID AS IF SOMETHING AWFUL MIGHT HAPPEN: NOT AT ALL

## 2024-09-24 ASSESSMENT — PATIENT HEALTH QUESTIONNAIRE - PHQ9
2. FEELING DOWN, DEPRESSED OR HOPELESS: SEVERAL DAYS
7. TROUBLE CONCENTRATING ON THINGS, SUCH AS READING THE NEWSPAPER OR WATCHING TELEVISION: NOT AT ALL
SUM OF ALL RESPONSES TO PHQ QUESTIONS 1-9: 7
SUM OF ALL RESPONSES TO PHQ QUESTIONS 1-9: 7
3. TROUBLE FALLING OR STAYING ASLEEP: SEVERAL DAYS
6. FEELING BAD ABOUT YOURSELF - OR THAT YOU ARE A FAILURE OR HAVE LET YOURSELF OR YOUR FAMILY DOWN: SEVERAL DAYS
4. FEELING TIRED OR HAVING LITTLE ENERGY: MORE THAN HALF THE DAYS
SUM OF ALL RESPONSES TO PHQ9 QUESTIONS 1 & 2: 2
SUM OF ALL RESPONSES TO PHQ QUESTIONS 1-9: 7
5. POOR APPETITE OR OVEREATING: SEVERAL DAYS
9. THOUGHTS THAT YOU WOULD BE BETTER OFF DEAD, OR OF HURTING YOURSELF: NOT AT ALL
10. IF YOU CHECKED OFF ANY PROBLEMS, HOW DIFFICULT HAVE THESE PROBLEMS MADE IT FOR YOU TO DO YOUR WORK, TAKE CARE OF THINGS AT HOME, OR GET ALONG WITH OTHER PEOPLE: SOMEWHAT DIFFICULT
SUM OF ALL RESPONSES TO PHQ QUESTIONS 1-9: 7
1. LITTLE INTEREST OR PLEASURE IN DOING THINGS: SEVERAL DAYS
8. MOVING OR SPEAKING SO SLOWLY THAT OTHER PEOPLE COULD HAVE NOTICED. OR THE OPPOSITE, BEING SO FIGETY OR RESTLESS THAT YOU HAVE BEEN MOVING AROUND A LOT MORE THAN USUAL: NOT AT ALL

## 2024-09-28 LAB
COTININE SERPL-MCNC: 12 NG/ML
NICOTINE SERPL-MCNC: <5 NG/ML

## 2024-09-30 ENCOUNTER — HOSPITAL ENCOUNTER (OUTPATIENT)
Age: 54
Discharge: HOME | End: 2024-10-02
Payer: COMMERCIAL

## 2024-09-30 ENCOUNTER — OFFICE VISIT (OUTPATIENT)
Dept: FAMILY MEDICINE CLINIC | Age: 54
End: 2024-09-30
Payer: COMMERCIAL

## 2024-09-30 ENCOUNTER — HOSPITAL ENCOUNTER (OUTPATIENT)
Dept: GENERAL RADIOLOGY | Age: 54
Discharge: HOME | End: 2024-10-02
Payer: COMMERCIAL

## 2024-09-30 ENCOUNTER — HOSPITAL ENCOUNTER (OUTPATIENT)
Age: 54
Setting detail: SPECIMEN
Discharge: HOME | End: 2024-09-30
Payer: COMMERCIAL

## 2024-09-30 VITALS
WEIGHT: 272 LBS | HEIGHT: 59 IN | HEART RATE: 81 BPM | OXYGEN SATURATION: 96 % | BODY MASS INDEX: 54.84 KG/M2 | SYSTOLIC BLOOD PRESSURE: 128 MMHG | DIASTOLIC BLOOD PRESSURE: 72 MMHG

## 2024-09-30 DIAGNOSIS — R39.9 UTI SYMPTOMS: ICD-10-CM

## 2024-09-30 DIAGNOSIS — R07.81 RIB PAIN ON LEFT SIDE: ICD-10-CM

## 2024-09-30 DIAGNOSIS — L02.92 BOIL: ICD-10-CM

## 2024-09-30 DIAGNOSIS — R39.9 UTI SYMPTOMS: Primary | ICD-10-CM
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
## 2024-09-30 PROCEDURE — 81002 URINALYSIS NONAUTO W/O SCOPE: CPT | Performed by: NURSE PRACTITIONER

## 2024-09-30 PROCEDURE — 3074F SYST BP LT 130 MM HG: CPT | Performed by: NURSE PRACTITIONER

## 2024-09-30 PROCEDURE — 87086 URINE CULTURE/COLONY COUNT: CPT

## 2024-09-30 PROCEDURE — 87077 CULTURE AEROBIC IDENTIFY: CPT

## 2024-09-30 PROCEDURE — 71101 X-RAY EXAM UNILAT RIBS/CHEST: CPT

## 2024-09-30 PROCEDURE — 3078F DIAST BP <80 MM HG: CPT | Performed by: NURSE PRACTITIONER

## 2024-09-30 PROCEDURE — 87186 SC STD MICRODIL/AGAR DIL: CPT

## 2024-09-30 PROCEDURE — 99213 OFFICE O/P EST LOW 20 MIN: CPT | Performed by: NURSE PRACTITIONER

## 2024-09-30 RX ORDER — AMOXICILLIN AND CLAVULANATE POTASSIUM 500; 125 MG/1; MG/1
1 TABLET, FILM COATED ORAL 2 TIMES DAILY
Qty: 14 TABLET | Refills: 0 | Status: SHIPPED | OUTPATIENT
Start: 2024-09-30 | End: 2024-10-05 | Stop reason: ALTCHOICE

## 2024-09-30 ASSESSMENT — ENCOUNTER SYMPTOMS
ABDOMINAL PAIN: 0
COUGH: 0
SHORTNESS OF BREATH: 0
VISUAL CHANGE: 0
VOMITING: 0
BOWEL INCONTINENCE: 0
WHEEZING: 0
NAUSEA: 0
DIARRHEA: 0

## 2024-09-30 NOTE — PROGRESS NOTES
Negative for chest pain.   Gastrointestinal:  Negative for abdominal pain, bowel incontinence, diarrhea, nausea and vomiting.   Genitourinary:  Positive for dysuria, flank pain, frequency, hematuria and urgency. Negative for hesitancy.   Skin:  Positive for wound.   Neurological:  Negative for tingling, loss of consciousness, numbness and headaches.       Objective:   /72 (Site: Left Lower Arm, Position: Sitting, Cuff Size: Large Adult)   Pulse 81   Ht 1.499 m (4' 11\")   Wt 123.4 kg (272 lb)   LMP 01/27/2015   SpO2 96%   BMI 54.94 kg/m²     Physical Exam  Vitals reviewed.   Constitutional:       General: She is not in acute distress.     Appearance: She is well-developed.   Cardiovascular:      Rate and Rhythm: Normal rate and regular rhythm.      Heart sounds: Normal heart sounds.   Pulmonary:      Effort: Pulmonary effort is normal. No respiratory distress.      Breath sounds: Normal breath sounds.   Abdominal:      General: Bowel sounds are normal.      Palpations: Abdomen is soft.      Tenderness: There is no abdominal tenderness. There is no right CVA tenderness or left CVA tenderness.   Musculoskeletal:         General: Normal range of motion.        Back:    Skin:     General: Skin is warm and dry.      Comments: Skin lesion noted on lower abdomen. No active drainage or surrounding erythema.   Neurological:      Mental Status: She is alert and oriented to person, place, and time.         Assessment:       Diagnosis Orders   1. UTI symptoms  POCT Urinalysis no Micro    Culture, Urine    amoxicillin-clavulanate (AUGMENTIN) 500-125 MG per tablet      2. Fall, initial encounter        3. Rib pain on left side  XR RIBS LEFT INCLUDE CHEST (MIN 3 VIEWS)      4. [facility]            Plan:      Orders Placed This Encounter   Procedures    Culture, Urine     Standing Status:   Future     Standing Expiration Date:   9/30/2025    XR RIBS LEFT INCLUDE CHEST (MIN 3 VIEWS)

## 2024-10-01 ENCOUNTER — NURSE ONLY (OUTPATIENT)
Dept: BARIATRICS/WEIGHT MGMT | Age: 54
End: 2024-10-01

## 2024-10-01 DIAGNOSIS — E66.01 MORBID OBESITY: Primary | ICD-10-CM

## 2024-10-04 ENCOUNTER — TELEPHONE (OUTPATIENT)
Dept: FAMILY MEDICINE CLINIC | Age: 54
End: 2024-10-04

## 2024-10-04 LAB
BACTERIA UR CULT: ABNORMAL
BACTERIA UR CULT: ABNORMAL
ORGANISM: ABNORMAL

## 2024-10-04 NOTE — TELEPHONE ENCOUNTER
----- Message from Dr. Mathieu Silvestre MD sent at 10/4/2024 11:59 AM EDT -----  Regarding: Patient needs new Abx  Please send this to ordering provider. Patient was given augmentin for UTI treatment and culture shows resistance. Patient will need new antibiotic from provider.     -Dr. NATHAN  ----- Message -----  From: Sharee Cordova Incoming Lab Results From [facility]  Sent: 10/4/2024   8:46 AM EDT  To: Mathieu Silvestre MD

## 2024-10-05 DIAGNOSIS — R39.9 UTI SYMPTOMS: Primary | ICD-10-CM

## 2024-10-05 RX ORDER — NITROFURANTOIN 25; 75 MG/1; MG/1
100 CAPSULE ORAL 2 TIMES DAILY
Qty: 10 CAPSULE | Refills: 0 | Status: SHIPPED | OUTPATIENT
Start: 2024-10-05 | End: 2024-10-10

## 2024-10-08 ENCOUNTER — TELEPHONE (OUTPATIENT)
Dept: FAMILY MEDICINE CLINIC | Age: 54
End: 2024-10-08

## 2024-10-16 DIAGNOSIS — E11.65 TYPE 2 DIABETES MELLITUS WITH HYPERGLYCEMIA, WITHOUT LONG-TERM CURRENT USE OF INSULIN (HCC): ICD-10-CM

## 2024-10-16 RX ORDER — METFORMIN HCL 500 MG
500 TABLET, EXTENDED RELEASE 24 HR ORAL
Qty: 90 TABLET | Refills: 1 | Status: SHIPPED | OUTPATIENT
Start: 2024-10-16 | End: 2024-10-17 | Stop reason: SDUPTHER

## 2024-10-16 RX ORDER — ONDANSETRON 4 MG/1
TABLET, FILM COATED ORAL
Qty: 30 TABLET | Refills: 0 | OUTPATIENT
Start: 2024-10-16

## 2024-10-16 RX ORDER — ATORVASTATIN CALCIUM 10 MG/1
10 TABLET, FILM COATED ORAL DAILY
Qty: 90 TABLET | Refills: 1 | Status: SHIPPED | OUTPATIENT
Start: 2024-10-16

## 2024-10-16 NOTE — TELEPHONE ENCOUNTER
Comments:     Last Office Visit (last PCP visit):   8/23/2024    Next Visit Date:  Future Appointments   Date Time Provider Department Center   10/17/2024  8:30 AM Mitesh Ahn MD [facility]   10/17/2024 11:00 AM Mathieu Silvestre MD [facility] [place] [facility] DE[facility]   10/28/2024 10:00 AM Candice Rodriguez RD [facility]   10/28/2024 10:30 AM Michel Stuart PSYD [facility]   10/28/2024 11:00 AM Gabi Pack MD [facility]   11/20/2024  8:00 AM [place] MAMMO ROOM 1 [facility] WOMENS [facility] Fac RAD   11/20/2024  9:00 AM [facility] M[facility]Z ULTRA [facility] Fac RAD   11/25/2024 10:30 AM Mathieu Silvestre MD [facility] [place] [facility] DE[facility]         Rx requested:  Requested Prescriptions     Pending Prescriptions Disp Refills    metFORMIN (GLUCOPHAGE-XR) 500 MG extended release tablet [Pharmacy Med Name: metFORMIN HCl  MG Oral Tablet Extended Release 24 Hour] 90 tablet 0     Sig: Take 1 tablet by mouth once daily with breakfast    atorvastatin (LIPITOR) 10 MG tablet [Pharmacy Med Name: Atorvastatin Calcium 10 MG Oral Tablet] 90 tablet 0     Sig: Take 1 tablet by mouth once daily

## 2024-10-17 ENCOUNTER — OFFICE VISIT (OUTPATIENT)
Dept: FAMILY MEDICINE CLINIC | Age: 54
End: 2024-10-17

## 2024-10-17 ENCOUNTER — HOSPITAL ENCOUNTER (OUTPATIENT)
Age: 54
Setting detail: SPECIMEN
Discharge: HOME | End: 2024-10-17
Payer: COMMERCIAL

## 2024-10-17 ENCOUNTER — OFFICE VISIT (OUTPATIENT)
Dept: BARIATRICS/WEIGHT MGMT | Age: 54
End: 2024-10-17
Payer: COMMERCIAL

## 2024-10-17 VITALS
HEIGHT: 59 IN | HEART RATE: 86 BPM | BODY MASS INDEX: 54.84 KG/M2 | SYSTOLIC BLOOD PRESSURE: 122 MMHG | DIASTOLIC BLOOD PRESSURE: 78 MMHG | OXYGEN SATURATION: 98 % | WEIGHT: 272 LBS

## 2024-10-17 VITALS
BODY MASS INDEX: 54.84 KG/M2 | SYSTOLIC BLOOD PRESSURE: 110 MMHG | DIASTOLIC BLOOD PRESSURE: 70 MMHG | HEIGHT: 59 IN | HEART RATE: 72 BPM | WEIGHT: 272 LBS | TEMPERATURE: 97.2 F | OXYGEN SATURATION: 97 %

## 2024-10-17 DIAGNOSIS — F32.9 MAJOR DEPRESSIVE DISORDER, REMISSION STATUS UNSPECIFIED, UNSPECIFIED WHETHER RECURRENT: ICD-10-CM

## 2024-10-17 DIAGNOSIS — R35.0 URINARY FREQUENCY: Primary | ICD-10-CM

## 2024-10-17 DIAGNOSIS — E66.01 MORBID OBESITY: Primary | ICD-10-CM

## 2024-10-17 DIAGNOSIS — R39.15 URINARY URGENCY: ICD-10-CM

## 2024-10-17 DIAGNOSIS — R35.0 URINARY FREQUENCY: ICD-10-CM

## 2024-10-17 DIAGNOSIS — E11.65 TYPE 2 DIABETES MELLITUS WITH HYPERGLYCEMIA, WITHOUT LONG-TERM CURRENT USE OF INSULIN (HCC): ICD-10-CM

## 2024-10-17 DIAGNOSIS — Z23 NEED FOR VACCINATION: ICD-10-CM

## 2024-10-17 DIAGNOSIS — L98.499 ULCER OF ABDOMEN WALL, UNSPECIFIED ULCER STAGE (HCC): ICD-10-CM

## 2024-10-17 LAB
BILIRUBIN, POC: ABNORMAL
BLOOD URINE, POC: ABNORMAL
CLARITY, POC: ABNORMAL
COLOR, POC: ABNORMAL
GLUCOSE URINE, POC: 3 MG/DL
KETONES, POC: ABNORMAL MG/DL
LEUKOCYTE EST, POC: ABNORMAL
NITRITE, POC: ABNORMAL
PH, POC: 5.5
PROTEIN, POC: -0.15 MG/DL
SPECIFIC GRAVITY, POC: 1.02
UROBILINOGEN, POC: 3.5 MG/DL

## 2024-10-17 PROCEDURE — 3078F DIAST BP <80 MM HG: CPT | Performed by: SURGERY

## 2024-10-17 PROCEDURE — 99215 OFFICE O/P EST HI 40 MIN: CPT | Performed by: SURGERY

## 2024-10-17 PROCEDURE — 87186 SC STD MICRODIL/AGAR DIL: CPT

## 2024-10-17 PROCEDURE — 3074F SYST BP LT 130 MM HG: CPT | Performed by: SURGERY

## 2024-10-17 PROCEDURE — 87086 URINE CULTURE/COLONY COUNT: CPT

## 2024-10-17 PROCEDURE — 87077 CULTURE AEROBIC IDENTIFY: CPT

## 2024-10-17 RX ORDER — DULOXETIN HYDROCHLORIDE 60 MG/1
60 CAPSULE, DELAYED RELEASE ORAL DAILY
Qty: 30 CAPSULE | Refills: 2 | Status: SHIPPED | OUTPATIENT
Start: 2024-10-17

## 2024-10-17 RX ORDER — METFORMIN HCL 500 MG
500 TABLET, EXTENDED RELEASE 24 HR ORAL
Qty: 90 TABLET | Refills: 1 | Status: SHIPPED | OUTPATIENT
Start: 2024-10-17

## 2024-10-17 ASSESSMENT — ENCOUNTER SYMPTOMS
CHEST TIGHTNESS: 0
NAUSEA: 0
SHORTNESS OF BREATH: 0
DIARRHEA: 0
VOMITING: 0
ABDOMINAL PAIN: 0

## 2024-10-17 NOTE — PROGRESS NOTES
Lakshmi Rebolledo (: 1970) is a 54 y.o. female, Established patient, who presents today for:    Chief Complaint   Patient presents with    Urinary Frequency         Assessment & Plan     1. Urinary frequency  Comments:  Recently completed full course of antibiotic for UTI based on culture sensitivities. Will repeat testing and treat based on results.  Orders:  -     POCT Urinalysis No Micro (Auto)  -     Urinalysis, Micro; Future  -     Culture, Urine; Future  2. Urinary urgency  Comments:  See above.  If urine culture negative I discussed with patient need for urogynecology referral.  Orders:  -     POCT Urinalysis No Micro (Auto)  -     Urinalysis, Micro; Future  -     Culture, Urine; Future  3. Ulcer of abdomen wall, unspecified ulcer stage ([facility])  Assessment & Plan:  Nonhealing wound noted over left abdomen.  I stressed with patient the importance of establishing care with wound clinic for further management in the setting of uncontrolled diabetes.  Patient is high risk for complications.  She was instructed to go to the emergency room for any worsening pain over the area with any streaking erythema or any new onset fever/chills/sweats.  Patient voiced understanding and agreed with plan.  Office staff will help her schedule ASAP visit with wound clinic.    Orders:  -     [facility]  4. Type 2 diabetes mellitus with hyperglycemia, without long-term current use of insulin ([facility])  -     metFORMIN (GLUCOPHAGE-XR) 500 MG extended release tablet; Take 1 tablet by mouth daily (with breakfast), Disp-90 tablet, R-1Normal  -     [facility]  5. Major depressive disorder, remission status unspecified, unspecified whether recurrent  Assessment & Plan:  Worsening mood reported since most recent office visit.  No reported SI/HI or self harming behaviors.  Patient agrees to increase dose of duloxetine to 60 mg daily.  She was again urged to call psychiatry and schedule a visit

## 2024-10-17 NOTE — PROGRESS NOTES
history and physical within 30 days of surgery.  We will also refer to Sleep Medicine to determine if there is sleep apnea and to begin treatment if present.    More than 40 minutes were spent in face-to-face interaction with patient majority of which were spent in care coordination of the above.  Lakshmi Rebolledo   has expressed interest in laparoscopic or robotic Joelle en Y gastric bypass       Mitesh Ahn MD, FASMBS, FACS, MBSAQIP Verified Surgeon  LC-USN-RET, Diplomate of The American Board of Surgery

## 2024-10-17 NOTE — ASSESSMENT & PLAN NOTE
Nonhealing wound noted over left abdomen.  I stressed with patient the importance of establishing care with wound clinic for further management in the setting of uncontrolled diabetes.  Patient is high risk for complications.  She was instructed to go to the emergency room for any worsening pain over the area with any streaking erythema or any new onset fever/chills/sweats.  Patient voiced understanding and agreed with plan.  Office staff will help her schedule ASAP visit with wound clinic.    
Worsening mood reported since most recent office visit.  No reported SI/HI or self harming behaviors.  Patient agrees to increase dose of duloxetine to 60 mg daily.  She was again urged to call psychiatry and schedule a visit with the specialist for further counseling/therapy and medication adjustments.    
DISPLAY PLAN FREE TEXT

## 2024-10-17 NOTE — PATIENT INSTRUCTIONS
Stop nicotine exposure. Smoking and second hand smoke will cause an ulcer after surgery that can bleed and/or perforate, potentially resulting in your death,

## 2024-10-20 LAB
BACTERIA UR CULT: ABNORMAL
BACTERIA UR CULT: ABNORMAL
ORGANISM: ABNORMAL

## 2024-10-22 DIAGNOSIS — N39.0 URINARY TRACT INFECTION WITHOUT HEMATURIA, SITE UNSPECIFIED: Primary | ICD-10-CM

## 2024-10-22 RX ORDER — NITROFURANTOIN 25; 75 MG/1; MG/1
100 CAPSULE ORAL 2 TIMES DAILY
Qty: 14 CAPSULE | Refills: 0 | Status: SHIPPED | OUTPATIENT
Start: 2024-10-22 | End: 2024-10-29

## 2024-10-23 ENCOUNTER — HOSPITAL ENCOUNTER (OUTPATIENT)
Dept: WOUND CARE | Age: 54
Discharge: HOME | End: 2024-10-23
Payer: COMMERCIAL

## 2024-10-23 VITALS
HEART RATE: 79 BPM | SYSTOLIC BLOOD PRESSURE: 148 MMHG | RESPIRATION RATE: 18 BRPM | TEMPERATURE: 96.4 F | DIASTOLIC BLOOD PRESSURE: 84 MMHG

## 2024-10-23 DIAGNOSIS — L98.499 ULCER OF ABDOMEN WALL, UNSPECIFIED ULCER STAGE (HCC): Primary | ICD-10-CM

## 2024-10-23 PROCEDURE — 17250 CHEM CAUT OF GRANLTJ TISSUE: CPT

## 2024-10-23 PROCEDURE — 99213 OFFICE O/P EST LOW 20 MIN: CPT

## 2024-10-23 RX ORDER — BETAMETHASONE DIPROPIONATE 0.5 MG/G
CREAM TOPICAL ONCE
OUTPATIENT
Start: 2024-10-23 | End: 2024-10-23

## 2024-10-23 RX ORDER — NEOMYCIN/BACITRACIN/POLYMYXINB 3.5-400-5K
OINTMENT (GRAM) TOPICAL ONCE
OUTPATIENT
Start: 2024-10-23 | End: 2024-10-23

## 2024-10-23 RX ORDER — LIDOCAINE HYDROCHLORIDE 20 MG/ML
JELLY TOPICAL ONCE
OUTPATIENT
Start: 2024-10-23 | End: 2024-10-23

## 2024-10-23 RX ORDER — SODIUM CHLOR/HYPOCHLOROUS ACID 0.033 %
SOLUTION, IRRIGATION IRRIGATION ONCE
OUTPATIENT
Start: 2024-10-23 | End: 2024-10-23

## 2024-10-23 RX ORDER — TRIAMCINOLONE ACETONIDE 1 MG/G
OINTMENT TOPICAL ONCE
OUTPATIENT
Start: 2024-10-23 | End: 2024-10-23

## 2024-10-23 RX ORDER — LIDOCAINE 50 MG/G
OINTMENT TOPICAL ONCE
OUTPATIENT
Start: 2024-10-23 | End: 2024-10-23

## 2024-10-23 RX ORDER — CLOBETASOL PROPIONATE 0.5 MG/G
OINTMENT TOPICAL ONCE
OUTPATIENT
Start: 2024-10-23 | End: 2024-10-23

## 2024-10-23 RX ORDER — SILVER SULFADIAZINE 10 MG/G
CREAM TOPICAL ONCE
OUTPATIENT
Start: 2024-10-23 | End: 2024-10-23

## 2024-10-23 RX ORDER — LIDOCAINE HYDROCHLORIDE 40 MG/ML
SOLUTION TOPICAL ONCE
OUTPATIENT
Start: 2024-10-23 | End: 2024-10-23

## 2024-10-23 RX ORDER — LIDOCAINE 40 MG/G
CREAM TOPICAL ONCE
OUTPATIENT
Start: 2024-10-23 | End: 2024-10-23

## 2024-10-23 RX ORDER — BACITRACIN ZINC AND POLYMYXIN B SULFATE 500; 1000 [USP'U]/G; [USP'U]/G
OINTMENT TOPICAL ONCE
OUTPATIENT
Start: 2024-10-23 | End: 2024-10-23

## 2024-10-23 RX ORDER — GENTAMICIN SULFATE 1 MG/G
OINTMENT TOPICAL ONCE
OUTPATIENT
Start: 2024-10-23 | End: 2024-10-23

## 2024-10-23 RX ORDER — MUPIROCIN 20 MG/G
OINTMENT TOPICAL ONCE
OUTPATIENT
Start: 2024-10-23 | End: 2024-10-23

## 2024-10-23 RX ORDER — GINSENG 100 MG
CAPSULE ORAL ONCE
OUTPATIENT
Start: 2024-10-23 | End: 2024-10-23

## 2024-10-23 NOTE — DISCHARGE INSTRUCTIONS
[facility]   Physician Orders and Discharge Instructions  [facility]  [address] [place]  [address]  Telephone: [phone]      [phone]      NAME:  Lakshmi Rebolledo          YOB: 1970  MEDICAL RECORD NUMBER:  [id number]    Your  is:  Kiara    Home Care/Facility: none    Wound Location: Abdomen    Dressing orders:.1.Cleanse wound(s) with normal saline.  2. Apply dry HYDROFERA BLUE READY FOAM or equivalent to wound bed.  NOTE: If your HYDROFRERA BLUE is not soft and pliable, moisten with saline until it is sponge like and then ring out excess saline and apply to wound bed.  3. Cover with 4x4's and wrap with gauze (santiago or kerlix)  4. Change  Every other day or Monday, Wednesday, and Friday    Compression: None    Offloading Device:    Other Instructions: Finish taking your antibiotics.    Keep all dressings clean, dry and intact.  Keep pressure off the wound(s) at all times.     Follow up visit   1 Week October 30, 2024 @ 2:15    Please give 24 hour notice if unable to keep appointment. [phone]    If you experience any of the following, please call the Wound Care Service at  [phone] or go to the nearest emergency room.   *Increase in pain *Temperature over 101 *Increase in drainage from your wound or a foul odor  *Uncontrolled swelling *Need for compression bandage changes due to slippage, breakthrough drainage       PLEASE NOTE: IF YOU ARE UNABLE TO OBTAIN WOUND SUPPLIES, CONTINUE TO USE THE SUPPLIES YOU HAVE AVAILABLE UNTIL YOU ARE ABLE TO REACH US. IT IS MOST IMPORTANT TO KEEP THE WOUND COVERED AT ALL TIMES

## 2024-10-23 NOTE — PROGRESS NOTES
[facility]   Progress Note and Procedure Note      Lakshmi Rebolledo  MEDICAL RECORD NUMBER:  [id number]  AGE: 54 y.o.   GENDER: female  : 1970  EPISODE DATE:  10/23/2024    Subjective:     Chief Complaint   Patient presents with    Wound Check         HISTORY of PRESENT ILLNESS HPI     Lakshmi Rebolledo is a 54 y.o. female who presents today for wound/ulcer evaluation.   History of Wound Context: Patient presents for initial evaluation of wound to left abdominal pannus.  Wound began approximately 2 weeks ago with a boil.  Boil drained and then wound has closed significantly however there is a small opening with tissue protruding from wound per patient.  She has noted no spreading redness.  No increased swelling, no odor, no warmth.  No fever or chills.  Occasionally it drains a small amount of brownish discharge- but most of drainage (white-pink) ended 2 weeks ago per patient.  She in on Macrobid for UTI currently.  Wound/Ulcer Pain Timing/Severity: none  Quality of pain: N/A  Severity:  0 / 10   Modifying Factors: None  Associated Signs/Symptoms: drainage    Ulcer Identification:  Ulcer Type:  Open boil  Contributing Factors: diabetes, decreased mobility, and obesity    Wound: N/A        PAST MEDICAL HISTORY        Diagnosis Date    Abnormal Pap smear of cervix     Cerebral artery occlusion with cerebral infarction ([facility])     CHF (congestive heart failure) ([facility]) ?    Chlamydia     Closed wedge compression fracture of L3 vertebra with routine healing 2024    COPD (chronic obstructive pulmonary disease) ([facility])     COPD (chronic obstructive pulmonary disease) ([facility]) 2022    Depression     Drug effect     plastic  tape    Esophagitis determined by endoscopy 07/10/2024    On EGD 07/10/2024 - noted at  junction    Heart disease 2014    History of abnormal cervical Pap smear 2024    History of blood transfusion     post delivery 32 yrs ago    History of colon polyps

## 2024-10-24 ENCOUNTER — HOSPITAL ENCOUNTER (OUTPATIENT)
Age: 54
Setting detail: OUTPATIENT SURGERY
Discharge: HOME | End: 2024-10-24
Attending: STUDENT IN AN ORGANIZED HEALTH CARE EDUCATION/TRAINING PROGRAM | Admitting: STUDENT IN AN ORGANIZED HEALTH CARE EDUCATION/TRAINING PROGRAM
Payer: COMMERCIAL

## 2024-10-24 ENCOUNTER — APPOINTMENT (OUTPATIENT)
Dept: GENERAL RADIOLOGY | Age: 54
End: 2024-10-24
Attending: STUDENT IN AN ORGANIZED HEALTH CARE EDUCATION/TRAINING PROGRAM
Payer: COMMERCIAL

## 2024-10-24 VITALS
WEIGHT: 272 LBS | HEIGHT: 59 IN | TEMPERATURE: 97.2 F | OXYGEN SATURATION: 93 % | SYSTOLIC BLOOD PRESSURE: 154 MMHG | DIASTOLIC BLOOD PRESSURE: 76 MMHG | BODY MASS INDEX: 54.84 KG/M2 | RESPIRATION RATE: 16 BRPM | HEART RATE: 79 BPM

## 2024-10-24 DIAGNOSIS — M47.816 LUMBAR SPONDYLOSIS: Primary | ICD-10-CM

## 2024-10-24 LAB
GLUCOSE BLD-MCNC: 171 MG/DL (ref 70–99)
PERFORMED ON: ABNORMAL

## 2024-10-24 PROCEDURE — 6370000000 HC RX 637 (ALT 250 FOR IP): Performed by: STUDENT IN AN ORGANIZED HEALTH CARE EDUCATION/TRAINING PROGRAM

## 2024-10-24 PROCEDURE — 2500000003 HC RX 250 WO HCPCS: Performed by: STUDENT IN AN ORGANIZED HEALTH CARE EDUCATION/TRAINING PROGRAM

## 2024-10-24 PROCEDURE — 3600000002 HC SURGERY LEVEL 2 BASE: Performed by: STUDENT IN AN ORGANIZED HEALTH CARE EDUCATION/TRAINING PROGRAM

## 2024-10-24 PROCEDURE — 64493 INJ PARAVERT F JNT L/S 1 LEV: CPT | Performed by: STUDENT IN AN ORGANIZED HEALTH CARE EDUCATION/TRAINING PROGRAM

## 2024-10-24 PROCEDURE — 7100000010 HC PHASE II RECOVERY - FIRST 15 MIN: Performed by: STUDENT IN AN ORGANIZED HEALTH CARE EDUCATION/TRAINING PROGRAM

## 2024-10-24 PROCEDURE — 64494 INJ PARAVERT F JNT L/S 2 LEV: CPT | Performed by: STUDENT IN AN ORGANIZED HEALTH CARE EDUCATION/TRAINING PROGRAM

## 2024-10-24 PROCEDURE — 2709999900 HC NON-CHARGEABLE SUPPLY: Performed by: STUDENT IN AN ORGANIZED HEALTH CARE EDUCATION/TRAINING PROGRAM

## 2024-10-24 RX ORDER — LIDOCAINE HYDROCHLORIDE 10 MG/ML
INJECTION, SOLUTION EPIDURAL; INFILTRATION; INTRACAUDAL; PERINEURAL PRN
Status: DISCONTINUED | OUTPATIENT
Start: 2024-10-24 | End: 2024-10-24 | Stop reason: ALTCHOICE

## 2024-10-24 RX ORDER — BUPIVACAINE HYDROCHLORIDE 2.5 MG/ML
10 INJECTION, SOLUTION EPIDURAL; INFILTRATION; INTRACAUDAL
Status: DISCONTINUED | OUTPATIENT
Start: 2024-10-24 | End: 2024-10-24 | Stop reason: HOSPADM

## 2024-10-24 RX ORDER — METHYLPREDNISOLONE ACETATE 80 MG/ML
80 INJECTION, SUSPENSION INTRA-ARTICULAR; INTRALESIONAL; INTRAMUSCULAR; SOFT TISSUE
Status: DISCONTINUED | OUTPATIENT
Start: 2024-10-24 | End: 2024-10-24 | Stop reason: HOSPADM

## 2024-10-24 RX ORDER — LORAZEPAM 1 MG/1
1 TABLET ORAL
Status: COMPLETED | OUTPATIENT
Start: 2024-10-24 | End: 2024-10-24

## 2024-10-24 RX ORDER — TRAMADOL HYDROCHLORIDE 50 MG/1
50 TABLET ORAL EVERY 6 HOURS PRN
COMMUNITY
Start: 2024-10-17

## 2024-10-24 RX ORDER — DEXAMETHASONE SODIUM PHOSPHATE 10 MG/ML
10 INJECTION, SOLUTION INTRAMUSCULAR; INTRAVENOUS
Status: DISCONTINUED | OUTPATIENT
Start: 2024-10-24 | End: 2024-10-24 | Stop reason: HOSPADM

## 2024-10-24 RX ORDER — LIDOCAINE HYDROCHLORIDE 10 MG/ML
15 INJECTION, SOLUTION EPIDURAL; INFILTRATION; INTRACAUDAL; PERINEURAL
Status: DISCONTINUED | OUTPATIENT
Start: 2024-10-24 | End: 2024-10-24 | Stop reason: HOSPADM

## 2024-10-24 RX ORDER — LIDOCAINE HYDROCHLORIDE 20 MG/ML
10 INJECTION, SOLUTION EPIDURAL; INFILTRATION; INTRACAUDAL; PERINEURAL
Status: DISCONTINUED | OUTPATIENT
Start: 2024-10-24 | End: 2024-10-24 | Stop reason: HOSPADM

## 2024-10-24 RX ORDER — SODIUM CHLORIDE 9 MG/ML
20 INJECTION, SOLUTION INTRAMUSCULAR; INTRAVENOUS; SUBCUTANEOUS
Status: DISCONTINUED | OUTPATIENT
Start: 2024-10-24 | End: 2024-10-24 | Stop reason: HOSPADM

## 2024-10-24 RX ORDER — LIDOCAINE HYDROCHLORIDE 20 MG/ML
INJECTION, SOLUTION EPIDURAL; INFILTRATION; INTRACAUDAL; PERINEURAL PRN
Status: DISCONTINUED | OUTPATIENT
Start: 2024-10-24 | End: 2024-10-24 | Stop reason: ALTCHOICE

## 2024-10-24 RX ADMIN — LORAZEPAM 1 MG: 1 TABLET ORAL at 10:25

## 2024-10-24 ASSESSMENT — PAIN DESCRIPTION - DESCRIPTORS: DESCRIPTORS: DULL

## 2024-10-24 ASSESSMENT — PAIN - FUNCTIONAL ASSESSMENT
PAIN_FUNCTIONAL_ASSESSMENT: 0-10
PAIN_FUNCTIONAL_ASSESSMENT: 0-10

## 2024-10-24 NOTE — PROGRESS NOTES
Pt received from procedure room via wheelchair. Report received from OR nurse. Pt transferred and ambulating without difficulty.  Neuro intact.  Pt tolerated procedure well. . Reviewed written discharge instructions with patient. Copy of written discharge instructions given to patient.

## 2024-10-24 NOTE — PROCEDURES
PROCEDURE NOTE  Date: 10/24/2024   Name: Lakshmi Rebolledo  YOB: 1970                          [facility]  Neurosurgery and Pain Management Center  [address] [place] [address], [address]  [place], [place], [address]  P: [phone]  F: [phone]          DIAGNOSTIC LUMBAR/SACRAL MEDIAL BRANCH BLOCK      Patient Name: Lakshmi Rebolledo : 1970  Date:10/24/24  Physician: Ta Guzman DO       Lakshmi Rebolledo is here today for interventional pain management.      Discussed the risks including but not limited to bleeding, infection, worsened pain, damage to surrounding structures, side effects, toxicity, allergic reactions to medications used, need for surgery, pneumothorax, abdominal and visceral injury, as well as catastrophic injury such as vision loss, paralysis, spinal cord or plexus injury, stroke, bowel or bladder incontinence, chest tube insertion, ventilator dependence, and death. Discussed the risks, benefits, and alternatives to the procedure including no procedure at all. Discussed that we cannot undo any permanent neurologic or orthopaedic damage or change the course of any underlying disease. After thorough discussion, patient expressed understanding and willingness to proceed. Written consent was obtained and is in the chart. Verbal consent to proceed was obtained.    Standard ASI guidelines were followed and sterile technique used.  Area was cleaned with Chloraprep.  Informed consent was obtained.  Fluoroscopic guidance was used for this procedure. Junction of superior articular process and transverse process was identified. For L5 dorsal primary ramus groove of sacral ala and SAP of S1 was identified. Appropriate length spinal needle (22g 5 in)was used and advanced to correct anatomic location. Negative aspiration was achieved.  At each site approximately 1/2cc of 2% preservative free Lidocaine was injected without difficulty.    Patient tolerated the procedure well, no obvious

## 2024-10-28 ENCOUNTER — OFFICE VISIT (OUTPATIENT)
Dept: BARIATRICS/WEIGHT MGMT | Age: 54
End: 2024-10-28
Payer: COMMERCIAL

## 2024-10-28 ENCOUNTER — OFFICE VISIT (OUTPATIENT)
Dept: PULMONOLOGY | Age: 54
End: 2024-10-28
Payer: COMMERCIAL

## 2024-10-28 VITALS
OXYGEN SATURATION: 96 % | SYSTOLIC BLOOD PRESSURE: 132 MMHG | DIASTOLIC BLOOD PRESSURE: 76 MMHG | BODY MASS INDEX: 54.19 KG/M2 | HEART RATE: 96 BPM | HEIGHT: 60 IN | WEIGHT: 276 LBS | TEMPERATURE: 97.6 F

## 2024-10-28 VITALS — BODY MASS INDEX: 54.5 KG/M2 | WEIGHT: 277.6 LBS | HEIGHT: 60 IN

## 2024-10-28 DIAGNOSIS — E66.01 CLASS 3 SEVERE OBESITY DUE TO EXCESS CALORIES WITH SERIOUS COMORBIDITY AND BODY MASS INDEX (BMI) OF 50.0 TO 59.9 IN ADULT: ICD-10-CM

## 2024-10-28 DIAGNOSIS — Z87.891 PERSONAL HISTORY OF TOBACCO USE: ICD-10-CM

## 2024-10-28 DIAGNOSIS — E11.65 TYPE 2 DIABETES MELLITUS WITH HYPERGLYCEMIA, WITHOUT LONG-TERM CURRENT USE OF INSULIN (HCC): ICD-10-CM

## 2024-10-28 DIAGNOSIS — E66.813 CLASS 3 SEVERE OBESITY DUE TO EXCESS CALORIES WITH SERIOUS COMORBIDITY AND BODY MASS INDEX (BMI) OF 50.0 TO 59.9 IN ADULT: ICD-10-CM

## 2024-10-28 DIAGNOSIS — E66.01 CLASS 3 SEVERE OBESITY WITH BODY MASS INDEX (BMI) OF 50.0 TO 59.9 IN ADULT, UNSPECIFIED OBESITY TYPE, UNSPECIFIED WHETHER SERIOUS COMORBIDITY PRESENT: ICD-10-CM

## 2024-10-28 DIAGNOSIS — F54 PSYCHOLOGICAL FACTORS AFFECTING MORBID OBESITY: ICD-10-CM

## 2024-10-28 DIAGNOSIS — E11.65 TYPE 2 DIABETES MELLITUS WITH HYPERGLYCEMIA, WITHOUT LONG-TERM CURRENT USE OF INSULIN (HCC): Primary | ICD-10-CM

## 2024-10-28 DIAGNOSIS — F43.10 PTSD (POST-TRAUMATIC STRESS DISORDER): ICD-10-CM

## 2024-10-28 DIAGNOSIS — E66.01 MORBID OBESITY: ICD-10-CM

## 2024-10-28 DIAGNOSIS — E66.813 CLASS 3 SEVERE OBESITY WITH BODY MASS INDEX (BMI) OF 50.0 TO 59.9 IN ADULT, UNSPECIFIED OBESITY TYPE, UNSPECIFIED WHETHER SERIOUS COMORBIDITY PRESENT: ICD-10-CM

## 2024-10-28 DIAGNOSIS — Z71.89 ENCOUNTER FOR PSYCHOLOGICAL ASSESSMENT PRIOR TO BARIATRIC SURGERY: Primary | ICD-10-CM

## 2024-10-28 DIAGNOSIS — F54 PSYCHOLOGICAL FACTORS AFFECTING MORBID OBESITY: Primary | ICD-10-CM

## 2024-10-28 DIAGNOSIS — E66.01 PSYCHOLOGICAL FACTORS AFFECTING MORBID OBESITY: ICD-10-CM

## 2024-10-28 DIAGNOSIS — G47.10 HYPERSOMNIA: Primary | ICD-10-CM

## 2024-10-28 DIAGNOSIS — I51.89 DIASTOLIC DYSFUNCTION: ICD-10-CM

## 2024-10-28 DIAGNOSIS — F17.200 NICOTINE USE DISORDER: ICD-10-CM

## 2024-10-28 DIAGNOSIS — G47.33 OSA (OBSTRUCTIVE SLEEP APNEA): ICD-10-CM

## 2024-10-28 DIAGNOSIS — E66.01 PSYCHOLOGICAL FACTORS AFFECTING MORBID OBESITY: Primary | ICD-10-CM

## 2024-10-28 DIAGNOSIS — Z71.89 ENCOUNTER FOR PSYCHOLOGICAL ASSESSMENT PRIOR TO BARIATRIC SURGERY: ICD-10-CM

## 2024-10-28 DIAGNOSIS — E66.01 MORBID OBESITY WITH BMI OF 60.0-69.9, ADULT: ICD-10-CM

## 2024-10-28 DIAGNOSIS — Z71.3 DIETARY COUNSELING AND SURVEILLANCE: ICD-10-CM

## 2024-10-28 DIAGNOSIS — I27.20 PULMONARY HYPERTENSION (HCC): ICD-10-CM

## 2024-10-28 PROCEDURE — 99214 OFFICE O/P EST MOD 30 MIN: CPT | Performed by: INTERNAL MEDICINE

## 2024-10-28 PROCEDURE — 3075F SYST BP GE 130 - 139MM HG: CPT | Performed by: INTERNAL MEDICINE

## 2024-10-28 PROCEDURE — 3078F DIAST BP <80 MM HG: CPT | Performed by: INTERNAL MEDICINE

## 2024-10-28 PROCEDURE — G0296 VISIT TO DETERM LDCT ELIG: HCPCS | Performed by: INTERNAL MEDICINE

## 2024-10-28 PROCEDURE — 3044F HG A1C LEVEL LT 7.0%: CPT | Performed by: DIETITIAN, REGISTERED

## 2024-10-28 PROCEDURE — 97803 MED NUTRITION INDIV SUBSEQ: CPT | Performed by: DIETITIAN, REGISTERED

## 2024-10-28 RX ORDER — MODAFINIL 100 MG/1
100 TABLET ORAL DAILY
Qty: 30 TABLET | Refills: 2 | Status: SHIPPED | OUTPATIENT
Start: 2024-10-28 | End: 2025-01-26

## 2024-10-28 ASSESSMENT — SLEEP AND FATIGUE QUESTIONNAIRES
ESS TOTAL SCORE: 21
HOW LIKELY ARE YOU TO NOD OFF OR FALL ASLEEP WHILE LYING DOWN TO REST IN THE AFTERNOON WHEN CIRCUMSTANCES PERMIT: HIGH CHANCE OF DOZING
HOW LIKELY ARE YOU TO NOD OFF OR FALL ASLEEP WHILE WATCHING TV: HIGH CHANCE OF DOZING
HOW LIKELY ARE YOU TO NOD OFF OR FALL ASLEEP WHILE SITTING INACTIVE IN A PUBLIC PLACE: HIGH CHANCE OF DOZING
HOW LIKELY ARE YOU TO NOD OFF OR FALL ASLEEP WHILE SITTING AND TALKING TO SOMEONE: SLIGHT CHANCE OF DOZING
HOW LIKELY ARE YOU TO NOD OFF OR FALL ASLEEP WHEN YOU ARE A PASSENGER IN A CAR FOR AN HOUR WITHOUT A BREAK: HIGH CHANCE OF DOZING
HOW LIKELY ARE YOU TO NOD OFF OR FALL ASLEEP WHILE SITTING AND READING: HIGH CHANCE OF DOZING
HOW LIKELY ARE YOU TO NOD OFF OR FALL ASLEEP WHILE SITTING QUIETLY AFTER LUNCH WITHOUT ALCOHOL: HIGH CHANCE OF DOZING
HOW LIKELY ARE YOU TO NOD OFF OR FALL ASLEEP IN A CAR, WHILE STOPPED FOR A FEW MINUTES IN TRAFFIC: MODERATE CHANCE OF DOZING

## 2024-10-28 NOTE — PROGRESS NOTES
Discussed with the patient the current USPSTF guidelines released March 9, 2021 for screening for lung cancer.    For adults aged 50 to 80 years who have a 20 pack-year smoking history and currently smoke or have quit within the past 15 years the grade B recommendation is to:  Screen for lung cancer with low-dose computed tomography (LDCT) every year.  Stop screening once a person has not smoked for 15 years or has a health problem that limits life expectancy or the ability to have lung surgery.    The patient  reports that she quit smoking about 3 months ago. Her smoking use included cigarettes. She started smoking about 42 years ago. She has a 100.7 pack-year smoking history. She has never used smokeless tobacco.. Discussed with patient the risks and benefits of screening, including over-diagnosis, false positive rate, and total radiation exposure.  The patient currently exhibits no signs or symptoms suggestive of lung cancer.  Discussed with patient the importance of compliance with yearly annual lung cancer screenings and willingness to undergo diagnosis and treatment if screening scan is positive.  In addition, the patient was counseled regarding the importance of remaining smoke free and/or total smoking cessation.    Also reviewed the following if the patient has Medicare that as of February 10, 2022, Medicare only covers LDCT screening in patients aged 50-77 with at least a 20 pack-year smoking history who currently smoke or have quit in the last 15 years

## 2024-10-28 NOTE — PROGRESS NOTES
Nutrition follow up prior to surgery  Visit number:  5 out of 6 for Joelle en Y gastric bypass.    Initial Weight: 285 lbs    Wt Readings from Last 3 Encounters:   10/24/24 123.4 kg (272 lb)   10/17/24 123.4 kg (272 lb)   10/17/24 123.4 kg (272 lb)     gained ~5 lbs over 1 month.    Previous nutrition goals:   Continue current intake  Continue to increase daily walking    Nutrition goals: not met, patient states \"not being as careful,\" higher starch intake, less water, and no exercise. Reports frustrated after most recent follow up with surgeon because was told may not be able to have surgery d/t positive nicotine tests from heavy second hand smoke. Patient states  has been reducing nicotine some but refuses to smoke outside. Blood nicotine test negative 9/24//24, will complete another test today. Patient states has not received any vitamins from assistance program.    PES Statement:  Overweight/Obesity related to a complex combination of decreased energy needs, disordered eating patterns, physical inactivity, and increased psychological/life stress as evidenced by BMI greater than 30 and inability to maintain a significant amount of weight loss through conventional weight loss interventions.    Intervention:  Reviewed previous goals and set new goals.  Stressed importance of preoperative weight loss/maintenance or surgery may be postponed or cancelled.  Provided continued education regarding diet and lifestyle changes for optimal success post bariatric surgery.  Encouragement and support provided.    Education materials provided:   none    Plan/Recommendations:   Resume high protein, low carbohydrate intake  Resume daily walking  RD will look fax application to vitamin assistance program again.    Follow up: 4 weeks     Total time involved in direct patient education: 30 minutes    Candice Rodriguez RD

## 2024-10-28 NOTE — PROGRESS NOTES
Subjective:     Lakshmi Rebolledo is a 54 y.o. female who complains today of:     Chief Complaint   Patient presents with    Follow-up    Sleep Apnea       HPI  Patient presents for ALYSHA      10/28/2024  Presents for sleep apnea follow-up, compliant with CPAP , using every day, she feels more energetic, continues to have daytime sleepiness and tiredness with frequent sleep attacks.  Denies chest pain, shortness of breath is minimal mainly exertional, she is currently on Ozempic trying to lose weight, and she follows up with weight loss surgery program.  No lower extremity edema, no nasal congestion or postnasal drip, no heartburn        9/11/2024  Doing good, shortness of breath mainly exertional, no coughing, no chest pain, no lower extremity edema, no heartburn, no nasal congestion postnasal drip  She received her CPAP late August, she is using every day and getting more and more used to it.  She is currently undergoing evaluation/workup for bariatric surgery.  Trying to lose weight,    7/31/2024  Presents for evaluation of shortness of breath, has been going on for almost 2 years, she quit smoking on July 11, she smoked since age 9 up to 4 packs/day, has no chest pain, no fever, she was recently diagnosed with mild sleep apnea and significant nocturnal hypoxia, she has chronic low back pain and gained weight over the years.  She would like to undergo bariatric surgery so she can eventually have back surgery done.  No lower extremity edema, no heartburn, no nasal congestion or postnasal drip.         Allergies:  Jardiance [empagliflozin] and Tape [adhesive tape]  Past Medical History:   Diagnosis Date    Abnormal Pap smear of cervix 1990    Cerebral artery occlusion with cerebral infarction ([facility])     CHF (congestive heart failure) ([facility]) 2023?    Chlamydia 1990    Closed wedge compression fracture of L3 vertebra with routine healing 04/04/2024    COPD (chronic obstructive pulmonary disease) ([facility])     COPD (chronic  Bexarotene Pregnancy And Lactation Text: This medication is Pregnancy Category X and should not be given to women who are pregnant or may become pregnant. This medication should not be used if you are breast feeding.

## 2024-10-30 ENCOUNTER — HOSPITAL ENCOUNTER (OUTPATIENT)
Dept: WOUND CARE | Age: 54
Discharge: HOME | End: 2024-10-30
Payer: COMMERCIAL

## 2024-10-30 VITALS
HEART RATE: 94 BPM | TEMPERATURE: 97.4 F | DIASTOLIC BLOOD PRESSURE: 83 MMHG | RESPIRATION RATE: 18 BRPM | SYSTOLIC BLOOD PRESSURE: 118 MMHG

## 2024-10-30 DIAGNOSIS — L02.92 RECURRENT BOILS: ICD-10-CM

## 2024-10-30 DIAGNOSIS — L98.499 ULCER OF ABDOMEN WALL, UNSPECIFIED ULCER STAGE (HCC): Primary | ICD-10-CM

## 2024-10-30 PROCEDURE — 99213 OFFICE O/P EST LOW 20 MIN: CPT

## 2024-10-30 PROCEDURE — 99213 OFFICE O/P EST LOW 20 MIN: CPT | Performed by: FAMILY MEDICINE

## 2024-10-30 RX ORDER — GENTAMICIN SULFATE 1 MG/G
OINTMENT TOPICAL ONCE
OUTPATIENT
Start: 2024-10-30 | End: 2024-10-30

## 2024-10-30 RX ORDER — TRIAMCINOLONE ACETONIDE 1 MG/G
OINTMENT TOPICAL ONCE
OUTPATIENT
Start: 2024-10-30 | End: 2024-10-30

## 2024-10-30 RX ORDER — BETAMETHASONE DIPROPIONATE 0.5 MG/G
CREAM TOPICAL ONCE
OUTPATIENT
Start: 2024-10-30 | End: 2024-10-30

## 2024-10-30 RX ORDER — LIDOCAINE 50 MG/G
OINTMENT TOPICAL ONCE
OUTPATIENT
Start: 2024-10-30 | End: 2024-10-30

## 2024-10-30 RX ORDER — CLOBETASOL PROPIONATE 0.5 MG/G
OINTMENT TOPICAL ONCE
OUTPATIENT
Start: 2024-10-30 | End: 2024-10-30

## 2024-10-30 RX ORDER — LIDOCAINE HYDROCHLORIDE 20 MG/ML
JELLY TOPICAL ONCE
OUTPATIENT
Start: 2024-10-30 | End: 2024-10-30

## 2024-10-30 RX ORDER — BACITRACIN ZINC AND POLYMYXIN B SULFATE 500; 1000 [USP'U]/G; [USP'U]/G
OINTMENT TOPICAL ONCE
OUTPATIENT
Start: 2024-10-30 | End: 2024-10-30

## 2024-10-30 RX ORDER — LIDOCAINE 40 MG/G
CREAM TOPICAL ONCE
OUTPATIENT
Start: 2024-10-30 | End: 2024-10-30

## 2024-10-30 RX ORDER — NEOMYCIN/BACITRACIN/POLYMYXINB 3.5-400-5K
OINTMENT (GRAM) TOPICAL ONCE
OUTPATIENT
Start: 2024-10-30 | End: 2024-10-30

## 2024-10-30 RX ORDER — MUPIROCIN 20 MG/G
OINTMENT TOPICAL ONCE
OUTPATIENT
Start: 2024-10-30 | End: 2024-10-30

## 2024-10-30 RX ORDER — SILVER SULFADIAZINE 10 MG/G
CREAM TOPICAL ONCE
OUTPATIENT
Start: 2024-10-30 | End: 2024-10-30

## 2024-10-30 RX ORDER — SODIUM CHLOR/HYPOCHLOROUS ACID 0.033 %
SOLUTION, IRRIGATION IRRIGATION ONCE
OUTPATIENT
Start: 2024-10-30 | End: 2024-10-30

## 2024-10-30 RX ORDER — LIDOCAINE HYDROCHLORIDE 40 MG/ML
SOLUTION TOPICAL ONCE
OUTPATIENT
Start: 2024-10-30 | End: 2024-10-30

## 2024-10-30 RX ORDER — GINSENG 100 MG
CAPSULE ORAL ONCE
OUTPATIENT
Start: 2024-10-30 | End: 2024-10-30

## 2024-10-30 ASSESSMENT — PAIN SCALES - GENERAL: PAINLEVEL_OUTOF10: 0

## 2024-10-30 NOTE — DISCHARGE INSTRUCTIONS
[facility] Wound Center and Hyperbaric Medicine   Physician Orders and Discharge Instructions  [facility]  [address] [place]  [address]  Telephone: [phone]      [phone]        NAME:  Lakshmi Rebolledo                                                                                       YOB: 1970  MEDICAL RECORD NUMBER:  [id number]     Your  is:  Kiara     Home Care/Facility: none     Wound Location: Abdomen     Dressing orders:.1.Cleanse wound(s) with normal saline.  2. Apply dry HYDROFERA BLUE READY FOAM or equivalent to wound bed.  NOTE: If your HYDROFRERA BLUE is not soft and pliable, moisten with saline until it is sponge like and then ring out excess saline and apply to wound bed.  3. Cover with 4x4's and wrap with gauze (santiago or kerlix)  4. Change  Every other day or Monday, Wednesday, and Friday                  Do the above wound order until the wound completely closes.     Compression: None     Offloading Device:     Other Instructions: A referral was made to Dermatology today.     Keep all dressings clean, dry and intact.  Keep pressure off the wound(s) at all times.      Follow up visit   2 weeks November 13, 2024 @  1:15                 Cancel your appt if your wound heals     Please give 24 hour notice if unable to keep appointment. [phone]     If you experience any of the following, please call the Wound Care Service at  [phone] or go to the nearest emergency room.        *Increase in pain*Temperature over 101*Increase in drainage from your wound or a foul odor  *Uncontrolled swelling*Need for compression bandage changes due to slippage, breakthrough drainage       PLEASE NOTE: IF YOU ARE UNABLE TO OBTAIN WOUND SUPPLIES, CONTINUE TO USE THE SUPPLIES YOU HAVE AVAILABLE UNTIL YOU ARE ABLE TO REACH US. IT IS MOST IMPORTANT TO KEEP THE WOUND COVERED AT ALL TIMES

## 2024-10-30 NOTE — PROGRESS NOTES
Plan:       Return to clinic in 2 weeks, sooner if wound worsens.  Advised to keep wound covered and clean.  Recommended tetanus vaccine.  Advised to go to the emergency room with signs symptoms of infection such as fever, chills, redness around wound, purulence, odor, warmth or swelling.  Recommended patient see dermatology regarding recurrent boils.    Treatment Note please see attached Discharge Instructions    Written patient dismissal instructions given to patient and signed by patient or POA.         Discharge Instructions              [facility] and Hyperbaric Medicine   Physician Orders and Discharge Instructions  [id number] [place]  [address]  Telephone: [phone]      [phone]        NAME:  Lakshmi Rebolledo                                                                                       YOB: 1970  MEDICAL RECORD NUMBER:  [id number]     Your  is:  Kiara     Home Care/Facility: none     Wound Location: Abdomen     Dressing orders:.1.Cleanse wound(s) with normal saline.  2. Apply dry HYDROFERA BLUE READY FOAM or equivalent to wound bed.  NOTE: If your HYDROFRERA BLUE is not soft and pliable, moisten with saline until it is sponge like and then ring out excess saline and apply to wound bed.  3. Cover with 4x4's and wrap with gauze (santiago or kerlix)  4. Change  Every other day or Monday, Wednesday, and Friday                  Do the above wound order until the wound completely closes.     Compression: None     Offloading Device:     Other Instructions:      Keep all dressings clean, dry and intact.  Keep pressure off the wound(s) at all times.      Follow up visit   2 weeks November 13, 2024 @      Please give 24 hour notice if unable to keep appointment. [phone]     If you experience any of the following, please call the Wound Care Service at  [phone] or go to the nearest emergency

## 2024-11-01 ENCOUNTER — OFFICE VISIT (OUTPATIENT)
Dept: BARIATRICS/WEIGHT MGMT | Age: 54
End: 2024-11-01

## 2024-11-01 VITALS — WEIGHT: 276.4 LBS | BODY MASS INDEX: 54.26 KG/M2 | HEIGHT: 60 IN

## 2024-11-01 DIAGNOSIS — K20.90 ESOPHAGITIS DETERMINED BY ENDOSCOPY: ICD-10-CM

## 2024-11-01 DIAGNOSIS — M25.562 CHRONIC PAIN OF BOTH KNEES: ICD-10-CM

## 2024-11-01 DIAGNOSIS — F54 PSYCHOLOGICAL FACTORS AFFECTING MORBID OBESITY: Primary | ICD-10-CM

## 2024-11-01 DIAGNOSIS — E66.01 CLASS 3 SEVERE OBESITY DUE TO EXCESS CALORIES WITH SERIOUS COMORBIDITY AND BODY MASS INDEX (BMI) OF 50.0 TO 59.9 IN ADULT: ICD-10-CM

## 2024-11-01 DIAGNOSIS — M54.50 CHRONIC MIDLINE LOW BACK PAIN WITHOUT SCIATICA: ICD-10-CM

## 2024-11-01 DIAGNOSIS — G47.33 OSA (OBSTRUCTIVE SLEEP APNEA): ICD-10-CM

## 2024-11-01 DIAGNOSIS — G89.29 CHRONIC PAIN OF BOTH KNEES: ICD-10-CM

## 2024-11-01 DIAGNOSIS — F43.10 PTSD (POST-TRAUMATIC STRESS DISORDER): ICD-10-CM

## 2024-11-01 DIAGNOSIS — G89.29 CHRONIC MIDLINE LOW BACK PAIN WITHOUT SCIATICA: ICD-10-CM

## 2024-11-01 DIAGNOSIS — E66.813 CLASS 3 SEVERE OBESITY DUE TO EXCESS CALORIES WITH SERIOUS COMORBIDITY AND BODY MASS INDEX (BMI) OF 50.0 TO 59.9 IN ADULT: ICD-10-CM

## 2024-11-01 DIAGNOSIS — M47.816 LUMBAR SPONDYLOSIS: ICD-10-CM

## 2024-11-01 DIAGNOSIS — I10 PRIMARY HYPERTENSION: ICD-10-CM

## 2024-11-01 DIAGNOSIS — F39 UNSPECIFIED MOOD (AFFECTIVE) DISORDER (HCC): ICD-10-CM

## 2024-11-01 DIAGNOSIS — M25.561 CHRONIC PAIN OF BOTH KNEES: ICD-10-CM

## 2024-11-01 DIAGNOSIS — E66.01 PSYCHOLOGICAL FACTORS AFFECTING MORBID OBESITY: Primary | ICD-10-CM

## 2024-11-01 DIAGNOSIS — E11.65 TYPE 2 DIABETES MELLITUS WITH HYPERGLYCEMIA, WITHOUT LONG-TERM CURRENT USE OF INSULIN (HCC): ICD-10-CM

## 2024-11-01 ASSESSMENT — PATIENT HEALTH QUESTIONNAIRE - PHQ9
SUM OF ALL RESPONSES TO PHQ QUESTIONS 1-9: 12
7. TROUBLE CONCENTRATING ON THINGS, SUCH AS READING THE NEWSPAPER OR WATCHING TELEVISION: NOT AT ALL
4. FEELING TIRED OR HAVING LITTLE ENERGY: NEARLY EVERY DAY
SUM OF ALL RESPONSES TO PHQ9 QUESTIONS 1 & 2: 4
9. THOUGHTS THAT YOU WOULD BE BETTER OFF DEAD, OR OF HURTING YOURSELF: NOT AT ALL
8. MOVING OR SPEAKING SO SLOWLY THAT OTHER PEOPLE COULD HAVE NOTICED. OR THE OPPOSITE, BEING SO FIGETY OR RESTLESS THAT YOU HAVE BEEN MOVING AROUND A LOT MORE THAN USUAL: NOT AT ALL
10. IF YOU CHECKED OFF ANY PROBLEMS, HOW DIFFICULT HAVE THESE PROBLEMS MADE IT FOR YOU TO DO YOUR WORK, TAKE CARE OF THINGS AT HOME, OR GET ALONG WITH OTHER PEOPLE: SOMEWHAT DIFFICULT
SUM OF ALL RESPONSES TO PHQ QUESTIONS 1-9: 12
2. FEELING DOWN, DEPRESSED OR HOPELESS: MORE THAN HALF THE DAYS
SUM OF ALL RESPONSES TO PHQ QUESTIONS 1-9: 12
6. FEELING BAD ABOUT YOURSELF - OR THAT YOU ARE A FAILURE OR HAVE LET YOURSELF OR YOUR FAMILY DOWN: SEVERAL DAYS
5. POOR APPETITE OR OVEREATING: SEVERAL DAYS
3. TROUBLE FALLING OR STAYING ASLEEP: NEARLY EVERY DAY
1. LITTLE INTEREST OR PLEASURE IN DOING THINGS: MORE THAN HALF THE DAYS
SUM OF ALL RESPONSES TO PHQ QUESTIONS 1-9: 12

## 2024-11-01 ASSESSMENT — ANXIETY QUESTIONNAIRES
5. BEING SO RESTLESS THAT IT IS HARD TO SIT STILL: NOT AT ALL
2. NOT BEING ABLE TO STOP OR CONTROL WORRYING: SEVERAL DAYS
6. BECOMING EASILY ANNOYED OR IRRITABLE: SEVERAL DAYS
4. TROUBLE RELAXING: NOT AT ALL
3. WORRYING TOO MUCH ABOUT DIFFERENT THINGS: NOT AT ALL
GAD7 TOTAL SCORE: 2
IF YOU CHECKED OFF ANY PROBLEMS ON THIS QUESTIONNAIRE, HOW DIFFICULT HAVE THESE PROBLEMS MADE IT FOR YOU TO DO YOUR WORK, TAKE CARE OF THINGS AT HOME, OR GET ALONG WITH OTHER PEOPLE: NOT DIFFICULT AT ALL
7. FEELING AFRAID AS IF SOMETHING AWFUL MIGHT HAPPEN: NOT AT ALL
1. FEELING NERVOUS, ANXIOUS, OR ON EDGE: NOT AT ALL

## 2024-11-01 NOTE — PROGRESS NOTES
BARIATRIC PRE-SURGICAL BEHAVIORAL HEALTH FOLLOW UP  Michel Stuart Psy.D., \[facility]\""  Licensed Clinical Psychologist ([place] [id number])        Name: Lakshmi Rebolledo  Date of Birth 1970  Visit Date: 11/1/2024   Time spent with Patient: 30 minutes.    Patient provided informed consent for behavioral health intervention. Discussed with patient the limits of confidentiality (i.e., abuse reporting, suicide intervention, review by treatment team, review by insurance company, etc.) and purpose of treatment. Patient indicated understanding.      JEAN MARIE Martins presents for follow up of preoperative counseling, education, and behavioral support to assist with making behavior and lifestyle changes necessary for proposed bariatric surgery.      Previous Recommendations: Lakshmi must: demonstrate abstinence from all nicotine use for a minimum or 3 months prior to surgery, as evidenced by laboratory testing; establish regular and ongoing treatment with an outpatient mental health provider in the community; demonstrate general stability of mood with reasonable symptom control; continued absence of binge episodes; continued absence of compensatory behaviors (abuse of laxatives, self-induced vomiting, etc.).     Lakshmi reports:     Reports frustration following follow up with Dr. Ahn; was told she may not be able to have surgery due to positive nicotine tests from heavy second hand smoke. States  has been reducing nicotine use somewhat, but refuses to smoke outside.    Got frustrated and \"gave up\" -was eating lots of bread, drinking soda, etc.     Mood: \"A little better, but frustrated and tired\"  Sleep: \"better, but still tired during the day\" -good CPAP compliance; started on Provigil  Medication: continues Cymbalta (for pain) and Topamax (for weight loss); also prescribed semaglutide by PCP    Nicotine: states she has not used any form of nicotine since 7/3/24, but states  smokes heavily in the home;

## 2024-11-02 LAB
COTININE SERPL-MCNC: 6 NG/ML
NICOTINE SERPL-MCNC: <5 NG/ML

## 2024-11-04 ENCOUNTER — TELEPHONE (OUTPATIENT)
Dept: PULMONOLOGY | Age: 54
End: 2024-11-04

## 2024-11-04 ENCOUNTER — TELEPHONE (OUTPATIENT)
Dept: WOUND CARE | Age: 54
End: 2024-11-04

## 2024-11-07 ENCOUNTER — PREP FOR PROCEDURE (OUTPATIENT)
Age: 54
End: 2024-11-07

## 2024-11-13 DIAGNOSIS — M25.562 CHRONIC PAIN OF BOTH KNEES: ICD-10-CM

## 2024-11-13 DIAGNOSIS — F32.9 MAJOR DEPRESSIVE DISORDER, REMISSION STATUS UNSPECIFIED, UNSPECIFIED WHETHER RECURRENT: ICD-10-CM

## 2024-11-13 DIAGNOSIS — M25.561 CHRONIC PAIN OF BOTH KNEES: ICD-10-CM

## 2024-11-13 DIAGNOSIS — E11.65 TYPE 2 DIABETES MELLITUS WITH HYPERGLYCEMIA, WITHOUT LONG-TERM CURRENT USE OF INSULIN (HCC): ICD-10-CM

## 2024-11-13 DIAGNOSIS — L30.4 INTERTRIGO: ICD-10-CM

## 2024-11-13 DIAGNOSIS — G89.29 CHRONIC PAIN OF BOTH KNEES: ICD-10-CM

## 2024-11-13 DIAGNOSIS — M47.816 LUMBAR SPONDYLOSIS: ICD-10-CM

## 2024-11-13 DIAGNOSIS — E66.01 MORBID OBESITY: ICD-10-CM

## 2024-11-13 DIAGNOSIS — I10 PRIMARY HYPERTENSION: ICD-10-CM

## 2024-11-13 DIAGNOSIS — G47.10 HYPERSOMNIA: ICD-10-CM

## 2024-11-13 RX ORDER — MODAFINIL 100 MG/1
100 TABLET ORAL DAILY
Qty: 30 TABLET | Refills: 2 | Status: CANCELLED | OUTPATIENT
Start: 2024-11-13 | End: 2025-02-11

## 2024-11-13 RX ORDER — LOSARTAN POTASSIUM 25 MG/1
25 TABLET ORAL DAILY
Qty: 90 TABLET | Refills: 1 | Status: SHIPPED | OUTPATIENT
Start: 2024-11-13

## 2024-11-13 RX ORDER — TIZANIDINE 2 MG/1
2 TABLET ORAL 3 TIMES DAILY PRN
Qty: 90 TABLET | Refills: 1 | Status: SHIPPED | OUTPATIENT
Start: 2024-11-13

## 2024-11-13 RX ORDER — NYSTATIN 100000 [USP'U]/G
POWDER TOPICAL
Qty: 60 G | Refills: 1 | Status: SHIPPED | OUTPATIENT
Start: 2024-11-13

## 2024-11-13 RX ORDER — GABAPENTIN 300 MG/1
300 CAPSULE ORAL 3 TIMES DAILY
Qty: 90 CAPSULE | Refills: 1 | Status: SHIPPED | OUTPATIENT
Start: 2024-11-13 | End: 2025-01-12

## 2024-11-13 RX ORDER — TOPIRAMATE 50 MG/1
50 TABLET, FILM COATED ORAL 2 TIMES DAILY
Qty: 60 TABLET | Refills: 0 | Status: SHIPPED | OUTPATIENT
Start: 2024-11-13

## 2024-11-13 RX ORDER — DULOXETIN HYDROCHLORIDE 60 MG/1
60 CAPSULE, DELAYED RELEASE ORAL DAILY
Qty: 90 CAPSULE | Refills: 1 | Status: SHIPPED | OUTPATIENT
Start: 2024-11-13

## 2024-11-13 RX ORDER — MELOXICAM 15 MG/1
15 TABLET ORAL DAILY
Qty: 30 TABLET | Refills: 3 | Status: SHIPPED | OUTPATIENT
Start: 2024-11-13

## 2024-11-13 RX ORDER — SEMAGLUTIDE 1.34 MG/ML
1 INJECTION, SOLUTION SUBCUTANEOUS
Qty: 9 ML | Refills: 1 | Status: SHIPPED | OUTPATIENT
Start: 2024-11-13

## 2024-11-13 NOTE — TELEPHONE ENCOUNTER
Rx requested:  Requested Prescriptions     Pending Prescriptions Disp Refills    modafinil (PROVIGIL) 100 MG tablet 30 tablet 2     Sig: Take 1 tablet by mouth daily for 90 days. Max Daily Amount: 100 mg       Last Office Visit:   10/28/2024      Next Visit Date:  Future Appointments   Date Time Provider Department Center   11/20/2024  8:00 AM RAF[facility] MAMMO ROOM 1 [facility] WOMENS [facility] Fac RAD   11/20/2024  9:00 AM [facility] M[facility] ULTRA [facility] Fac RAD   11/25/2024  8:30 AM Candice Rodriguez RD [facility]   11/25/2024  9:00 AM Michel Stuart PSYD [facility]   11/25/2024 10:30 AM Mathieu Silvestre MD [facility]   1/6/2025  8:45 AM Ta Guzman DO MLOX [facility]   1/23/2025 10:30 AM Gabi Pack MD [place] [facility]

## 2024-11-13 NOTE — TELEPHONE ENCOUNTER
Requested Prescriptions     Pending Prescriptions Disp Refills    tiZANidine (ZANAFLEX) 2 MG tablet 90 tablet 1     Sig: Take 1 tablet by mouth 3 times daily as needed (spasms)    meloxicam (MOBIC) 15 MG tablet 30 tablet 3     Sig: Take 1 tablet by mouth daily    gabapentin (NEURONTIN) 300 MG capsule 90 capsule 1     Sig: Take 1 capsule by mouth 3 times daily for 60 days.       Patient last seen on:  10/24/24 PROCEDURE    Date of last refill:  9/10 +1 refill (Gabapentin), 5/1 +3 refills (meloxicam), 5/1 +1 refill (tizanidine)    Reason for request:  patient request    Request date for pharmacy pick-up:  11/13/24    Next office visit date: 1/6/2025 (PROCEDURE 12/3/24)    Jardiance [empagliflozin] and Tape [adhesive tape]

## 2024-11-13 NOTE — TELEPHONE ENCOUNTER
Dr. Ahn,     LOV: 10/17/24  Next OV: not scheduled  Would you like an appointment scheduled prior to refilling this medication?    Ayah CABALLERO

## 2024-11-13 NOTE — TELEPHONE ENCOUNTER
Comments:     Last Office Visit (last PCP visit):   10/17/2024    Next Visit Date:  Future Appointments   Date Time Provider Department Center   11/20/2024  8:00 AM [facility] MAMMO ROOM 1 [facility] Fac RAD   11/20/2024  9:00 AM [facility] MLOZ ULTRA M[facility]Z Fac RAD   11/25/2024  8:30 AM Candice Rodriguez RD [facility]X [facility]   11/25/2024  9:00 AM Michel Stuart PSYD [facility]   11/25/2024 10:30 AM Mathieu Silvestre MD MLOX [facility] DEP   1/6/2025  8:45 AM Ta Guzman DO M[facility]X [facility]   1/23/2025 10:30 AM [facility] [place] [facility]       **If hasn't been seen in over a year OR hasn't followed up according to last diabetes/ADHD visit, make appointment for patient before sending refill to provider.    Rx requested:  Requested Prescriptions     Pending Prescriptions Disp Refills    Semaglutide, 1 MG/DOSE, (OZEMPIC, 1 MG/DOSE,) 4 MG/3ML SOPN sc injection 9 mL 1     Sig: Inject 1 mg into the skin every 7 days    nystatin (MYCOSTATIN) 790881 UNIT/GM powder 60 g 1     Sig: Apply 3 times daily.    losartan (COZAAR) 25 MG tablet 90 tablet 1     Sig: Take 1 tablet by mouth daily    DULoxetine (CYMBALTA) 60 MG extended release capsule 30 capsule 2     Sig: Take 1 capsule by mouth daily                   
Yes

## 2024-11-15 DIAGNOSIS — G47.10 HYPERSOMNIA: ICD-10-CM

## 2024-11-15 NOTE — TELEPHONE ENCOUNTER
PT DOES NOT WANT TO USE [facility] FOR THIS RX. PLEASE SEND TO [facility]       Rx requested:  Requested Prescriptions     Pending Prescriptions Disp Refills    modafinil (PROVIGIL) 100 MG tablet 30 tablet 2     Sig: Take 1 tablet by mouth daily for 90 days. Max Daily Amount: 100 mg       Last Office Visit:   10/28/2024      Next Visit Date:  Future Appointments   Date Time Provider Department Center   11/20/2024  8:00 AM [place] MAMMO ROOM 1 [facility] Fac RAD   11/20/2024  9:00 AM [facility] MLOZ ULTRA [facility] Fac RAD   11/25/2024  8:30 AM Candice Rodriguez RD M[facility]X [facility] [place]   11/25/2024  9:00 AM Michel Stuart PSYD [facility]X [facility] [place]   11/25/2024 10:30 AM Mathieu Silvestre MD M[facility]X [facility]   1/6/2025  8:45 AM Ta Guzman DO MLOX [facility] [place]   1/23/2025 10:30 AM Gabi Pack MD [place] [facility]

## 2024-11-20 ENCOUNTER — HOSPITAL ENCOUNTER (OUTPATIENT)
Dept: ULTRASOUND IMAGING | Age: 54
Discharge: HOME | End: 2024-11-22
Payer: COMMERCIAL

## 2024-11-20 ENCOUNTER — HOSPITAL ENCOUNTER (OUTPATIENT)
Dept: WOMENS IMAGING | Age: 54
Discharge: HOME | End: 2024-11-22
Payer: COMMERCIAL

## 2024-11-20 DIAGNOSIS — G47.10 HYPERSOMNIA: Primary | ICD-10-CM

## 2024-11-20 DIAGNOSIS — N63.10 MASS OF MULTIPLE SITES OF RIGHT BREAST: ICD-10-CM

## 2024-11-20 PROCEDURE — 76642 ULTRASOUND BREAST LIMITED: CPT

## 2024-11-20 PROCEDURE — G0279 TOMOSYNTHESIS, MAMMO: HCPCS

## 2024-11-20 RX ORDER — MODAFINIL 100 MG/1
100 TABLET ORAL DAILY
Qty: 30 TABLET | Refills: 2 | Status: SHIPPED | OUTPATIENT
Start: 2024-11-20 | End: 2025-02-18

## 2024-11-20 NOTE — TELEPHONE ENCOUNTER
Patient never even received her first script for the modafinil due to [facility] giving her a hard time. Would like for it to go to [facility] outpatient pharmacy.

## 2024-11-20 NOTE — TELEPHONE ENCOUNTER
Patient never received the first one due to [facility] giving her a hard time and would like for it to go to our pharmacy.

## 2024-11-21 NOTE — TELEPHONE ENCOUNTER
Prior authorization has been done multiple times - \"no prior authorization required\" -- Pharmacy is saying a prior authorization is still needed -- another prior authorization sent today by Lucia Adame, JACKIEA

## 2024-11-22 RX ORDER — MODAFINIL 100 MG/1
100 TABLET ORAL DAILY
Qty: 30 TABLET | Refills: 0 | OUTPATIENT
Start: 2024-11-22 | End: 2024-12-22

## 2024-11-23 NOTE — RESULT ENCOUNTER NOTE
Notify patient that follow-up diagnostic mammogram and ultrasound of the right breast show no suspicious findings.  Radiology believes area in the right breast is benign in appearance.  Radiology recommends follow-up diagnostic mammogram of bilateral breasts in 6 months with right breast ultrasound.

## 2024-11-25 ENCOUNTER — TELEPHONE (OUTPATIENT)
Dept: PULMONOLOGY | Age: 54
End: 2024-11-25

## 2024-11-25 ENCOUNTER — OFFICE VISIT (OUTPATIENT)
Dept: BARIATRICS/WEIGHT MGMT | Age: 54
End: 2024-11-25
Payer: COMMERCIAL

## 2024-11-25 ENCOUNTER — OFFICE VISIT (OUTPATIENT)
Dept: FAMILY MEDICINE CLINIC | Age: 54
End: 2024-11-25

## 2024-11-25 ENCOUNTER — HOSPITAL ENCOUNTER (OUTPATIENT)
Dept: LAB | Age: 54
Discharge: HOME | End: 2024-11-25
Payer: COMMERCIAL

## 2024-11-25 VITALS
OXYGEN SATURATION: 97 % | HEIGHT: 60 IN | SYSTOLIC BLOOD PRESSURE: 124 MMHG | BODY MASS INDEX: 54.03 KG/M2 | HEART RATE: 73 BPM | TEMPERATURE: 97.8 F | DIASTOLIC BLOOD PRESSURE: 68 MMHG | WEIGHT: 275.2 LBS

## 2024-11-25 VITALS — HEIGHT: 60 IN | WEIGHT: 274.8 LBS | BODY MASS INDEX: 53.95 KG/M2

## 2024-11-25 DIAGNOSIS — I10 PRIMARY HYPERTENSION: ICD-10-CM

## 2024-11-25 DIAGNOSIS — F17.200 NICOTINE USE DISORDER: ICD-10-CM

## 2024-11-25 DIAGNOSIS — R80.9 TYPE 2 DIABETES MELLITUS WITH DIABETIC MICROALBUMINURIA, WITHOUT LONG-TERM CURRENT USE OF INSULIN (HCC): ICD-10-CM

## 2024-11-25 DIAGNOSIS — Z87.891 HISTORY OF TOBACCO USE: ICD-10-CM

## 2024-11-25 DIAGNOSIS — D72.829 LEUKOCYTOSIS, UNSPECIFIED TYPE: ICD-10-CM

## 2024-11-25 DIAGNOSIS — E11.65 TYPE 2 DIABETES MELLITUS WITH HYPERGLYCEMIA, WITHOUT LONG-TERM CURRENT USE OF INSULIN (HCC): Primary | ICD-10-CM

## 2024-11-25 DIAGNOSIS — Z23 NEED FOR VACCINATION: ICD-10-CM

## 2024-11-25 DIAGNOSIS — Z01.818 PREOPERATIVE TESTING: ICD-10-CM

## 2024-11-25 DIAGNOSIS — E11.65 TYPE 2 DIABETES MELLITUS WITH HYPERGLYCEMIA, WITHOUT LONG-TERM CURRENT USE OF INSULIN (HCC): ICD-10-CM

## 2024-11-25 DIAGNOSIS — R82.90 ABNORMAL URINE ODOR: ICD-10-CM

## 2024-11-25 DIAGNOSIS — E66.813 CLASS 3 SEVERE OBESITY DUE TO EXCESS CALORIES WITH SERIOUS COMORBIDITY AND BODY MASS INDEX (BMI) OF 50.0 TO 59.9 IN ADULT: ICD-10-CM

## 2024-11-25 DIAGNOSIS — E11.29 TYPE 2 DIABETES MELLITUS WITH DIABETIC MICROALBUMINURIA, WITHOUT LONG-TERM CURRENT USE OF INSULIN (HCC): Primary | ICD-10-CM

## 2024-11-25 DIAGNOSIS — R10.2 SUPRAPUBIC PRESSURE: ICD-10-CM

## 2024-11-25 DIAGNOSIS — R80.9 TYPE 2 DIABETES MELLITUS WITH DIABETIC MICROALBUMINURIA, WITHOUT LONG-TERM CURRENT USE OF INSULIN (HCC): Primary | ICD-10-CM

## 2024-11-25 DIAGNOSIS — R79.89 ABNORMAL LFTS (LIVER FUNCTION TESTS): ICD-10-CM

## 2024-11-25 DIAGNOSIS — D75.1 POLYCYTHEMIA: ICD-10-CM

## 2024-11-25 DIAGNOSIS — E66.01 CLASS 3 SEVERE OBESITY DUE TO EXCESS CALORIES WITH SERIOUS COMORBIDITY AND BODY MASS INDEX (BMI) OF 50.0 TO 59.9 IN ADULT: ICD-10-CM

## 2024-11-25 DIAGNOSIS — E66.01 MORBID OBESITY: ICD-10-CM

## 2024-11-25 DIAGNOSIS — F32.A DEPRESSION, UNSPECIFIED DEPRESSION TYPE: ICD-10-CM

## 2024-11-25 DIAGNOSIS — E11.29 TYPE 2 DIABETES MELLITUS WITH DIABETIC MICROALBUMINURIA, WITHOUT LONG-TERM CURRENT USE OF INSULIN (HCC): ICD-10-CM

## 2024-11-25 DIAGNOSIS — R35.0 URINARY FREQUENCY: ICD-10-CM

## 2024-11-25 DIAGNOSIS — Z71.3 DIETARY COUNSELING AND SURVEILLANCE: ICD-10-CM

## 2024-11-25 DIAGNOSIS — G47.33 OSA (OBSTRUCTIVE SLEEP APNEA): ICD-10-CM

## 2024-11-25 LAB
ALBUMIN SERPL-MCNC: 3.8 G/DL (ref 3.5–4.6)
ALP SERPL-CCNC: 96 U/L (ref 40–130)
ALT SERPL-CCNC: 31 U/L (ref 0–33)
ANION GAP SERPL CALCULATED.3IONS-SCNC: 12 MEQ/L (ref 9–15)
AST SERPL-CCNC: 18 U/L (ref 0–35)
BACTERIA URNS QL MICRO: ABNORMAL /HPF
BASOPHILS # BLD: 0.1 K/UL (ref 0–0.2)
BASOPHILS NFR BLD: 0.6 %
BILIRUB SERPL-MCNC: 0.3 MG/DL (ref 0.2–0.7)
BILIRUB UR QL STRIP: NEGATIVE
BUN SERPL-MCNC: 14 MG/DL (ref 6–20)
CALCIUM SERPL-MCNC: 9.3 MG/DL (ref 8.5–9.9)
CHLORIDE SERPL-SCNC: 108 MEQ/L (ref 95–107)
CHOLEST SERPL-MCNC: 152 MG/DL (ref 0–199)
CLARITY UR: CLEAR
CO2 SERPL-SCNC: 23 MEQ/L (ref 20–31)
COLOR UR: YELLOW
CREAT SERPL-MCNC: 0.8 MG/DL (ref 0.5–0.9)
EOSINOPHIL # BLD: 0.4 K/UL (ref 0–0.7)
EOSINOPHIL NFR BLD: 2.5 %
EPI CELLS #/AREA URNS AUTO: ABNORMAL /HPF (ref 0–5)
ERYTHROCYTE [DISTWIDTH] IN BLOOD BY AUTOMATED COUNT: 12.8 % (ref 11.5–14.5)
GLOBULIN SER CALC-MCNC: 3.2 G/DL (ref 2.3–3.5)
GLUCOSE SERPL-MCNC: 104 MG/DL (ref 70–99)
GLUCOSE UR STRIP-MCNC: >=1000 MG/DL
HCT VFR BLD AUTO: 47.9 % (ref 37–47)
HDLC SERPL-MCNC: 48 MG/DL (ref 40–59)
HGB BLD-MCNC: 15.5 G/DL (ref 12–16)
HGB UR QL STRIP: ABNORMAL
HYALINE CASTS #/AREA URNS AUTO: ABNORMAL /HPF (ref 0–5)
KETONES UR STRIP-MCNC: NEGATIVE MG/DL
LDLC SERPL CALC-MCNC: 85 MG/DL (ref 0–129)
LEUKOCYTE ESTERASE UR QL STRIP: NEGATIVE
LYMPHOCYTES # BLD: 2.6 K/UL (ref 1–4.8)
LYMPHOCYTES NFR BLD: 17.9 %
MCH RBC QN AUTO: 31.4 PG (ref 27–31.3)
MCHC RBC AUTO-ENTMCNC: 32.4 % (ref 33–37)
MCV RBC AUTO: 97 FL (ref 79.4–94.8)
MONOCYTES # BLD: 1 K/UL (ref 0.2–0.8)
MONOCYTES NFR BLD: 7.2 %
NEUTROPHILS # BLD: 10.3 K/UL (ref 1.4–6.5)
NEUTS SEG NFR BLD: 71.1 %
NITRITE UR QL STRIP: POSITIVE
PH UR STRIP: 5.5 [PH] (ref 5–9)
PLATELET # BLD AUTO: 262 K/UL (ref 130–400)
POTASSIUM SERPL-SCNC: 4 MEQ/L (ref 3.4–4.9)
PROT SERPL-MCNC: 7 G/DL (ref 6.3–8)
PROT UR STRIP-MCNC: NEGATIVE MG/DL
RBC # BLD AUTO: 4.94 M/UL (ref 4.2–5.4)
RBC #/AREA URNS AUTO: ABNORMAL /HPF (ref 0–5)
SODIUM SERPL-SCNC: 143 MEQ/L (ref 135–144)
SP GR UR STRIP: 1.03 (ref 1–1.03)
TRIGL SERPL-MCNC: 94 MG/DL (ref 0–150)
TSH REFLEX: 1.38 UIU/ML (ref 0.44–3.86)
UROBILINOGEN UR STRIP-ACNC: 1 E.U./DL
WBC # BLD AUTO: 14.4 K/UL (ref 4.8–10.8)
WBC #/AREA URNS AUTO: ABNORMAL /HPF (ref 0–5)

## 2024-11-25 PROCEDURE — 82306 VITAMIN D 25 HYDROXY: CPT

## 2024-11-25 PROCEDURE — 81001 URINALYSIS AUTO W/SCOPE: CPT

## 2024-11-25 PROCEDURE — 36415 COLL VENOUS BLD VENIPUNCTURE: CPT

## 2024-11-25 PROCEDURE — 82746 ASSAY OF FOLIC ACID SERUM: CPT

## 2024-11-25 PROCEDURE — 83540 ASSAY OF IRON: CPT

## 2024-11-25 PROCEDURE — 80061 LIPID PANEL: CPT

## 2024-11-25 PROCEDURE — 83036 HEMOGLOBIN GLYCOSYLATED A1C: CPT

## 2024-11-25 PROCEDURE — 82607 VITAMIN B-12: CPT

## 2024-11-25 PROCEDURE — 85025 COMPLETE CBC W/AUTO DIFF WBC: CPT

## 2024-11-25 PROCEDURE — 87077 CULTURE AEROBIC IDENTIFY: CPT

## 2024-11-25 PROCEDURE — 80053 COMPREHEN METABOLIC PANEL: CPT

## 2024-11-25 PROCEDURE — 3044F HG A1C LEVEL LT 7.0%: CPT | Performed by: DIETITIAN, REGISTERED

## 2024-11-25 PROCEDURE — 87086 URINE CULTURE/COLONY COUNT: CPT

## 2024-11-25 PROCEDURE — 97803 MED NUTRITION INDIV SUBSEQ: CPT | Performed by: DIETITIAN, REGISTERED

## 2024-11-25 PROCEDURE — 87186 SC STD MICRODIL/AGAR DIL: CPT

## 2024-11-25 PROCEDURE — 83550 IRON BINDING TEST: CPT

## 2024-11-25 PROCEDURE — 84443 ASSAY THYROID STIM HORMONE: CPT

## 2024-11-25 PROCEDURE — 80307 DRUG TEST PRSMV CHEM ANLYZR: CPT

## 2024-11-25 ASSESSMENT — ENCOUNTER SYMPTOMS
CHEST TIGHTNESS: 0
DIARRHEA: 0
CONSTIPATION: 0
COUGH: 0
BLOOD IN STOOL: 0
WHEEZING: 0
SHORTNESS OF BREATH: 0
ANAL BLEEDING: 0
NAUSEA: 0
VOMITING: 0
ABDOMINAL PAIN: 0

## 2024-11-25 NOTE — ASSESSMENT & PLAN NOTE
I reviewed with patient the importance of tight glucose, blood pressure, and cholesterol control to help with preserving renal function long-term.  We will continue to follow testing over time to monitor.

## 2024-11-25 NOTE — ASSESSMENT & PLAN NOTE
Patient reporting nightly use of CPAP machine at prescribed settings.  They were advised to work toward a goal of getting 7-8 hours of sleep per night. Sleep medicine specialist has recommended provigil for daytime tiredness. We will continue to follow over time

## 2024-11-25 NOTE — PROGRESS NOTES
Nutrition follow up prior to surgery  Visit number:  6 out of 6 for Joelle en Y gastric bypass.    Initial Weight: 285 lbs    Wt Readings from Last 3 Encounters:   11/01/24 125.4 kg (276 lb 6.4 oz)   10/28/24 125.2 kg (276 lb)   10/28/24 125.9 kg (277 lb 9.6 oz)     lost 3 lbs over 1 month, down 10 lbs from initial.    Previous nutrition goals:   Resume high protein, low carbohydrate intake  Resume daily walking  RD will look fax application to vitamin assistance program again.    Nutrition goals: met, patient reports back on track with diet, eating 5 small meals, drinking > 80 oz water, avoiding caffeine and carbonation. Taking adult MVI, still working on getting approved for vitamin assistance program. Patient also looking into joining [employer]. Patient states  now only smokes outside.    Patient has completed required nutrition appointments and has met nutrition goals.  Reviewed importance of preoperative weight loss to promote a safer and easier surgery.  Patient is cleared from nutrition for bariatric surgery at this time.  Will follow up preoperatively as needed.      PES Statement:  Overweight/Obesity related to a complex combination of decreased energy needs, disordered eating patterns, physical inactivity, and increased psychological/life stress as evidenced by BMI greater than 30 and inability to maintain a significant amount of weight loss through conventional weight loss interventions.    Intervention:  Reviewed previous goals and set new goals.  Stressed importance of preoperative weight loss/maintenance or surgery may be postponed or cancelled.  Provided continued education regarding diet and lifestyle changes for optimal success post bariatric surgery.  Encouragement and support provided.    Education materials provided:   none    Plan/Recommendations:   Continue weight loss efforts  Prepare for 4 week pre op diet    Follow up: 1 week post op    Total time involved in direct patient

## 2024-11-25 NOTE — ASSESSMENT & PLAN NOTE
Stable with use of medication.  No reported SI/HI or self harming behaviors.  Patient instructed to continue with current dose of duloxetine.

## 2024-11-25 NOTE — ASSESSMENT & PLAN NOTE
Patient has remained tobacco free since 07/03/2024. She was congratulated and encouraged to abstain from all tobacco use.

## 2024-11-25 NOTE — PROGRESS NOTES
visual disturbance.   Respiratory:  Negative for cough, chest tightness, shortness of breath and wheezing.    Cardiovascular:  Negative for chest pain, palpitations and leg swelling.        No orthopnea, No PND   Gastrointestinal:  Negative for abdominal pain, anal bleeding, blood in stool, constipation, diarrhea, nausea and vomiting.        No heartburn, No melena   Endocrine: Negative for cold intolerance, heat intolerance, polydipsia and polyuria.   Genitourinary:  Positive for frequency. Negative for dysuria, flank pain, hematuria, pelvic pain and urgency.   Musculoskeletal:  Negative for myalgias.   Skin:  Negative for rash.   Neurological:  Negative for dizziness, syncope, weakness, light-headedness, numbness and headaches.   Psychiatric/Behavioral:  Positive for sleep disturbance. Negative for dysphoric mood. The patient is not nervous/anxious.               Objective     Vitals:  /68   Pulse 73   Temp 97.8 °F (36.6 °C) (Temporal)   Ht 1.524 m (5')   Wt 124.8 kg (275 lb 3.2 oz)   LMP 01/27/2015   SpO2 97%   BMI 53.75 kg/m²     Physical Exam  Vitals reviewed.   Constitutional:       General: She is not in acute distress.     Appearance: She is obese. She is not ill-appearing or diaphoretic.   Eyes:      General: No scleral icterus.  Neck:      Thyroid: No thyroid mass, thyromegaly or thyroid tenderness.      Vascular: No carotid bruit.   Cardiovascular:      Rate and Rhythm: Normal rate and regular rhythm.      Heart sounds: Normal heart sounds. No murmur heard.  Pulmonary:      Effort: Pulmonary effort is normal. No respiratory distress.      Breath sounds: Normal breath sounds. No wheezing, rhonchi or rales.   Abdominal:      General: Bowel sounds are normal. There is no distension.      Palpations: Abdomen is soft.      Tenderness: There is no abdominal tenderness. There is no right CVA tenderness, left CVA tenderness, guarding or rebound.   Musculoskeletal:      Cervical back: Neck supple.

## 2024-11-25 NOTE — TELEPHONE ENCOUNTER
Pts insurance denied the modafinil. She is asking what you would like to give her instead.        Please advise

## 2024-11-25 NOTE — ASSESSMENT & PLAN NOTE
Most recent A1c at goal control.  Patient instructed to continue with current doses of metformin and semaglutide.  Patient encouraged to continue making best efforts to eat a lower carbohydrate and lower saturated fat diet.

## 2024-11-26 ENCOUNTER — NURSE ONLY (OUTPATIENT)
Dept: BARIATRICS/WEIGHT MGMT | Age: 54
End: 2024-11-26

## 2024-11-26 ENCOUNTER — TELEPHONE (OUTPATIENT)
Dept: FAMILY MEDICINE CLINIC | Age: 54
End: 2024-11-26

## 2024-11-26 ENCOUNTER — PREP FOR PROCEDURE (OUTPATIENT)
Dept: BARIATRICS/WEIGHT MGMT | Age: 54
End: 2024-11-26

## 2024-11-26 DIAGNOSIS — E11.29 TYPE 2 DIABETES MELLITUS WITH MICROALBUMINURIA, WITHOUT LONG-TERM CURRENT USE OF INSULIN (HCC): ICD-10-CM

## 2024-11-26 DIAGNOSIS — E55.9 VITAMIN D DEFICIENCY: Primary | ICD-10-CM

## 2024-11-26 DIAGNOSIS — R80.9 TYPE 2 DIABETES MELLITUS WITH MICROALBUMINURIA, WITHOUT LONG-TERM CURRENT USE OF INSULIN (HCC): ICD-10-CM

## 2024-11-26 DIAGNOSIS — E66.01 MORBID (SEVERE) OBESITY DUE TO EXCESS CALORIES: Primary | ICD-10-CM

## 2024-11-26 DIAGNOSIS — E66.01 MORBID (SEVERE) OBESITY DUE TO EXCESS CALORIES: ICD-10-CM

## 2024-11-26 PROBLEM — K21.9 GERD (GASTROESOPHAGEAL REFLUX DISEASE): Status: ACTIVE | Noted: 2024-11-26

## 2024-11-26 LAB
ESTIMATED AVERAGE GLUCOSE: 148 MG/DL
FOLATE: 7.2 NG/ML (ref 4.8–24.2)
HBA1C MFR BLD: 6.8 % (ref 4–6)
IRON % SATURATION: 47 % (ref 20–55)
IRON: 102 UG/DL (ref 37–145)
TOTAL IRON BINDING CAPACITY: 218 UG/DL (ref 250–450)
UNSATURATED IRON BINDING CAPACITY: 116 UG/DL (ref 112–347)
VITAMIN B-12: 603 PG/ML (ref 232–1245)
VITAMIN D 25-HYDROXY: 11 NG/ML (ref 30–100)

## 2024-11-26 RX ORDER — ERGOCALCIFEROL 1.25 MG/1
50000 CAPSULE, LIQUID FILLED ORAL WEEKLY
Qty: 6 CAPSULE | Refills: 0 | Status: SHIPPED | OUTPATIENT
Start: 2024-11-26 | End: 2025-01-07

## 2024-11-26 RX ORDER — ACETAMINOPHEN 160 MG
2000 TABLET,DISINTEGRATING ORAL DAILY
Qty: 90 CAPSULE | Refills: 1 | Status: SHIPPED | OUTPATIENT
Start: 2024-11-26

## 2024-11-26 NOTE — TELEPHONE ENCOUNTER
Dr Ahn has been in surgery all day and was yesterday as well, so he has not seen these results yet, the office stated that we could handle it and Dr Ahn would be okay with it.

## 2024-11-26 NOTE — RESULT ENCOUNTER NOTE
Please notify patient that recent labwork shows the followin.  Kidney function testing is much improved and now within normal range.  2.  Liver function testing is normal  3.  Cholesterol testing is at goal control for known diabetes  4.  Initial urine testing shows signs of white blood cells, blood, and potential byproducts of bacteria.  I am awaiting formal urine culture results which should return in the next 24 hours to confirm any UTI and ensure that we select the correct antibiotic for management with her history of recent antibiotic use.    Please advise patient that any other lab results not specifically mentioned in message above are either normal, stable compared to previous results, not clinically significant, or would not change the current treatment plan.

## 2024-11-26 NOTE — TELEPHONE ENCOUNTER
----- Message from Dr. Mathieu Silvestre MD sent at 11/26/2024 11:03 AM EST -----  Labs were obtained by bariatrics office. Please find out if they are managing low vitamin D or if they are asking us to manage.     -Dr. Silvestre  ----- Message -----  From: Sharee Cordova Incoming Lab Results From Soft  Sent: 11/26/2024   1:31 AM EST  To: Mathieu Silvestre MD

## 2024-11-26 NOTE — TELEPHONE ENCOUNTER
Please notify patient that her recent vitamin D level is low.  I have sent to the pharmacy TWO different vitamin D supplements.  One of them is a daily 2000 IU vitamin D3 supplement that she should continue until otherwise instructed. The other is a 91006 IU supplement that is ONCE A WEEK for the next 6 weeks only. She should return to the lab in 6 weeks once completed with the high dose supplement for repeat testing to reassess.

## 2024-11-26 NOTE — RESULT ENCOUNTER NOTE
Please notify patient that recent A1c is at goal control for diabetes and blood counts are stable with normal hemoglobin level, but continued elevated white blood cell count.  Patient should keep regular follow-up visits with hematology office as instructed by the specialist.

## 2024-11-26 NOTE — PROGRESS NOTES
Pradaxa, Xarelto or any other blood thinner).     DO NOT take any aspirin or anti-inflammatory drugs (ex. Ibuprofen, Advil, Motrin, Aleve, naproxen) for 7 days prior to your procedure.       Discontinue 1 day before surgery:  Metformin/Glucophage (Potential lactic acidosis), possibly some oral glycemic control medications.   Insulin -- Regular (consult with PCP/Endo if BS > 160)  Insulin -- NPH = 1/2 dose for the 2 days before, none AM of surgery (consult PCP/Endo if BS > 160)  Lantus/Levemir = 1/2 dose for 2 days before, none AM of surgery (consult PCP/Endo if BS > 160)  Diuretics (Lasix, furosemide, HCTZ)  Antihypertensive Combinations  ACE Inhibitors (Lisinopril, Lotensin, Vasotec)       Start Day Before Surgery: Bariatric Clear Liquid diet and Bowel Prep (see page 2 for instructions).      NO food or drink after midnight the night before surgery.     The morning of procedure HOLD all medications except for beta blockers (I.E., metoprolol, sotol, carvedilol) and seizure medications. You may take these medications with a little sip of water, just enough to get the pills down.     The aprepitant (Emend)125 mg is to be taken the morning of surgery approximately 1 hour prior to surgery with very small sip of water, just enough to get pill down.    Arriving at the hospital: Morning of surgery enter at the All-Patient Entrance (Door B) and check-in at Surgery Welcome Desk.    Please bring your Photo ID, Insurance Cards, list of all current medications, and your CPAP.    Please do not wear any jewelry such as watches, neckless, bracelets, or body piercing. You will be asked to remove eyeglasses, hearing aids, partials, or dentures prior to surgery.      Skin Prep: Night Before & Morning of Surgery   The night prior and morning of surgery, you will be instructed to shower with an antibacterial soap followed by Kellie-Hex (Chlorhexidine) provided by [facility]. Shower the night before first with antibacterial soap, rinse, and

## 2024-11-27 ENCOUNTER — TELEPHONE (OUTPATIENT)
Dept: FAMILY MEDICINE CLINIC | Age: 54
End: 2024-11-27

## 2024-11-27 LAB
AMPHETAMINES SERPL QL SCN: NEGATIVE NG/ML
ANNOTATION COMMENT IMP: NORMAL
BACTERIA UR CULT: ABNORMAL
BACTERIA UR CULT: ABNORMAL
BARBITURATES SERPL QL SCN: NEGATIVE NG/ML
BENZODIAZ SERPL QL SCN: NEGATIVE NG/ML
BUPRENORPHINE: NEGATIVE NG/ML
CANNABINOIDS SERPL QL SCN: NEGATIVE NG/ML
COCAINE SERPL QL SCN: NEGATIVE NG/ML
METHADONE SERPL QL SCN: NEGATIVE NG/ML
METHAMPHET SERPL QL: NEGATIVE NG/ML
OPIATES SERPL QL SCN: NEGATIVE NG/ML
ORGANISM: ABNORMAL
OXYCODONE SERPL QL: NEGATIVE NG/ML
PCP SERPL QL SCN: NEGATIVE NG/ML

## 2024-11-27 NOTE — TELEPHONE ENCOUNTER
Patient should take the daily vitamin D3 supplement on a daily basis and take the weekly higher dose supplement once a week. She should start both of then now.     I am awaiting her final culture and sensitivity results to prescribe antibiotic for her UTI. We are on second round of antibiotic so I want to make sure we are using correct antibiotic.

## 2024-11-27 NOTE — TELEPHONE ENCOUNTER
Pt has two prescriptions for vitamin d , pt asking can she take both at the same time or take the Vitamin D 1.25mg for 6 days tfirst then take Vitamin D 50mg .    Please advise.

## 2024-11-29 DIAGNOSIS — B96.20 E-COLI UTI: Primary | ICD-10-CM

## 2024-11-29 DIAGNOSIS — N39.0 E-COLI UTI: Primary | ICD-10-CM

## 2024-11-29 RX ORDER — NITROFURANTOIN 25; 75 MG/1; MG/1
100 CAPSULE ORAL 2 TIMES DAILY
Qty: 14 CAPSULE | Refills: 0 | Status: SHIPPED | OUTPATIENT
Start: 2024-11-29 | End: 2024-12-06

## 2024-11-29 NOTE — RESULT ENCOUNTER NOTE
E.coli UTI sens to macrobid. RX sent to pharmacy. [employer] message sent to patient. Please confirm with her that she has started antibiotic for management.

## 2024-12-02 ENCOUNTER — OFFICE VISIT (OUTPATIENT)
Dept: BARIATRICS/WEIGHT MGMT | Age: 54
End: 2024-12-02
Payer: COMMERCIAL

## 2024-12-02 VITALS — HEIGHT: 60 IN | WEIGHT: 272.6 LBS | BODY MASS INDEX: 53.52 KG/M2

## 2024-12-02 DIAGNOSIS — M25.561 CHRONIC PAIN OF BOTH KNEES: ICD-10-CM

## 2024-12-02 DIAGNOSIS — K20.90 ESOPHAGITIS DETERMINED BY ENDOSCOPY: ICD-10-CM

## 2024-12-02 DIAGNOSIS — M54.50 CHRONIC MIDLINE LOW BACK PAIN WITHOUT SCIATICA: ICD-10-CM

## 2024-12-02 DIAGNOSIS — G89.29 CHRONIC MIDLINE LOW BACK PAIN WITHOUT SCIATICA: ICD-10-CM

## 2024-12-02 DIAGNOSIS — E66.813 CLASS 3 SEVERE OBESITY DUE TO EXCESS CALORIES WITH SERIOUS COMORBIDITY AND BODY MASS INDEX (BMI) OF 50.0 TO 59.9 IN ADULT: ICD-10-CM

## 2024-12-02 DIAGNOSIS — E11.65 TYPE 2 DIABETES MELLITUS WITH HYPERGLYCEMIA, WITHOUT LONG-TERM CURRENT USE OF INSULIN (HCC): ICD-10-CM

## 2024-12-02 DIAGNOSIS — G47.33 OSA (OBSTRUCTIVE SLEEP APNEA): ICD-10-CM

## 2024-12-02 DIAGNOSIS — G89.29 CHRONIC PAIN OF BOTH KNEES: ICD-10-CM

## 2024-12-02 DIAGNOSIS — E66.01 PSYCHOLOGICAL FACTORS AFFECTING MORBID OBESITY: Primary | ICD-10-CM

## 2024-12-02 DIAGNOSIS — E66.01 CLASS 3 SEVERE OBESITY DUE TO EXCESS CALORIES WITH SERIOUS COMORBIDITY AND BODY MASS INDEX (BMI) OF 50.0 TO 59.9 IN ADULT: ICD-10-CM

## 2024-12-02 DIAGNOSIS — F54 PSYCHOLOGICAL FACTORS AFFECTING MORBID OBESITY: Primary | ICD-10-CM

## 2024-12-02 DIAGNOSIS — I10 PRIMARY HYPERTENSION: ICD-10-CM

## 2024-12-02 DIAGNOSIS — F39 UNSPECIFIED MOOD (AFFECTIVE) DISORDER (HCC): ICD-10-CM

## 2024-12-02 DIAGNOSIS — F43.10 PTSD (POST-TRAUMATIC STRESS DISORDER): ICD-10-CM

## 2024-12-02 DIAGNOSIS — M25.562 CHRONIC PAIN OF BOTH KNEES: ICD-10-CM

## 2024-12-02 DIAGNOSIS — M47.816 LUMBAR SPONDYLOSIS: ICD-10-CM

## 2024-12-02 PROCEDURE — 90832 PSYTX W PT 30 MINUTES: CPT | Performed by: PSYCHOLOGIST

## 2024-12-02 ASSESSMENT — ANXIETY QUESTIONNAIRES
6. BECOMING EASILY ANNOYED OR IRRITABLE: SEVERAL DAYS
IF YOU CHECKED OFF ANY PROBLEMS ON THIS QUESTIONNAIRE, HOW DIFFICULT HAVE THESE PROBLEMS MADE IT FOR YOU TO DO YOUR WORK, TAKE CARE OF THINGS AT HOME, OR GET ALONG WITH OTHER PEOPLE: NOT DIFFICULT AT ALL
2. NOT BEING ABLE TO STOP OR CONTROL WORRYING: SEVERAL DAYS
7. FEELING AFRAID AS IF SOMETHING AWFUL MIGHT HAPPEN: NOT AT ALL
1. FEELING NERVOUS, ANXIOUS, OR ON EDGE: SEVERAL DAYS
5. BEING SO RESTLESS THAT IT IS HARD TO SIT STILL: NOT AT ALL
GAD7 TOTAL SCORE: 3
4. TROUBLE RELAXING: NOT AT ALL
3. WORRYING TOO MUCH ABOUT DIFFERENT THINGS: NOT AT ALL

## 2024-12-02 ASSESSMENT — PATIENT HEALTH QUESTIONNAIRE - PHQ9
SUM OF ALL RESPONSES TO PHQ QUESTIONS 1-9: 8
1. LITTLE INTEREST OR PLEASURE IN DOING THINGS: SEVERAL DAYS
9. THOUGHTS THAT YOU WOULD BE BETTER OFF DEAD, OR OF HURTING YOURSELF: NOT AT ALL
3. TROUBLE FALLING OR STAYING ASLEEP: MORE THAN HALF THE DAYS
5. POOR APPETITE OR OVEREATING: NOT AT ALL
10. IF YOU CHECKED OFF ANY PROBLEMS, HOW DIFFICULT HAVE THESE PROBLEMS MADE IT FOR YOU TO DO YOUR WORK, TAKE CARE OF THINGS AT HOME, OR GET ALONG WITH OTHER PEOPLE: SOMEWHAT DIFFICULT
2. FEELING DOWN, DEPRESSED OR HOPELESS: SEVERAL DAYS
SUM OF ALL RESPONSES TO PHQ9 QUESTIONS 1 & 2: 2
8. MOVING OR SPEAKING SO SLOWLY THAT OTHER PEOPLE COULD HAVE NOTICED. OR THE OPPOSITE, BEING SO FIGETY OR RESTLESS THAT YOU HAVE BEEN MOVING AROUND A LOT MORE THAN USUAL: NOT AT ALL
SUM OF ALL RESPONSES TO PHQ QUESTIONS 1-9: 8
SUM OF ALL RESPONSES TO PHQ QUESTIONS 1-9: 8
7. TROUBLE CONCENTRATING ON THINGS, SUCH AS READING THE NEWSPAPER OR WATCHING TELEVISION: NOT AT ALL
6. FEELING BAD ABOUT YOURSELF - OR THAT YOU ARE A FAILURE OR HAVE LET YOURSELF OR YOUR FAMILY DOWN: MORE THAN HALF THE DAYS
SUM OF ALL RESPONSES TO PHQ QUESTIONS 1-9: 8
4. FEELING TIRED OR HAVING LITTLE ENERGY: MORE THAN HALF THE DAYS

## 2024-12-02 NOTE — PROGRESS NOTES
so it is important to take daily vitamins and supplements as recommended by your surgeon and the bariatric dietitian.        6. You may not smoke or use other nicotine products (including replacement therapy, such as patches or gum) after bariatric surgery. Not only is smoking the number one cause of preventable death in the [place], but it can delay healing. Smoking also increases your risk for blood clots, pneumonia, ulcers, wound infections, and other medical problems. Patients who choose to start smoking again may be in the hospital frequently with complications, such as marginal ulcers.       7. It is important that you avoid all alcohol use for at least 6 months after surgery. Bariatric surgery can change the way the body absorbs and metabolizes alcohol, which may result in rapid intoxication and toxicity and substantially increased risk for liver damage; risk of developing an alcohol use disorder increases significantly. In addition, alcohol has little nutritional value and prevents the absorption of nutrients, such as thiamin, and this can cause deficiency after surgery. Furthermore, alcohol can cause your blood sugar levels to decline to dangerously low levels in the early postoperative periods, can lead to dehydration and electrolyte imbalance, and can impair judgement, potentially affecting food choices.       8. We advise all women of childbearing years (<50) to use an effective birth control option, such as an intrauterine device (IUD), for the first 2 years following bariatric surgery. Note that birth control pills may not be as effective due to malabsorption. During this time, the rapid weight loss and inability to consume normal food quantities impact a patient's nutritional status. The physical demands of a pregnancy in the first two years after surgery can compromise fetal health and may result in birth defects.       9. The following book recommendations may be helpful for you: \"50 Ways to

## 2024-12-02 NOTE — PATIENT INSTRUCTIONS
Bariatric surgery is a powerful tool to help you lose weight, but it's not a magic bullet. It's just one part of your weight loss journey and requires your active participation to be successful. The surgery helps you eat less, but you'll need to make permanent changes to your diet, exercise habits, and overall lifestyle to achieve and maintain your weight loss goals. These changes are crucial for long-term success. This is a lifelong process; the surgery is just the beginning. You'll need to commit to new eating habits, regular exercise, and ongoing medical follow-ups for the rest of your life. It is also important to have realistic expectations. While you will likely lose significant weight, you may not reach your 'ideal' body image. Focus on health improvements rather than just appearance.       Surgery doesn't automatically fix emotional or psychological issues related to eating. Many patients have great success, but it's because they put in the hard work after surgery. They changed their diets, started exercising regularly, and addressed their emotional relationship with food. Lasting results take time; this isn't a quick fix, but a gradual process of changing your relationship with food and your body. Building a strong support system is crucial. This journey is easier when you have friends, family, and professionals helping you stay accountable and motivated. There will be challenges along the way, like plateaus in weight loss or the temptation to return to old habits; it's normal, and we're here to support you through these obstacles.    Outcomes may be further improved if the following recommendations are adhered to:        1. Following surgery, you may benefit from additional supportive programming to help cope with the dietary restrictions required of you. Patients who belong to a support group are most likely to succeed in their weight loss regimen and maintain their weight loss long term; therefore, a

## 2024-12-03 ENCOUNTER — APPOINTMENT (OUTPATIENT)
Dept: GENERAL RADIOLOGY | Age: 54
End: 2024-12-03
Attending: STUDENT IN AN ORGANIZED HEALTH CARE EDUCATION/TRAINING PROGRAM
Payer: COMMERCIAL

## 2024-12-03 ENCOUNTER — HOSPITAL ENCOUNTER (OUTPATIENT)
Age: 54
Setting detail: OUTPATIENT SURGERY
Discharge: HOME | End: 2024-12-03
Attending: STUDENT IN AN ORGANIZED HEALTH CARE EDUCATION/TRAINING PROGRAM | Admitting: STUDENT IN AN ORGANIZED HEALTH CARE EDUCATION/TRAINING PROGRAM
Payer: COMMERCIAL

## 2024-12-03 VITALS
HEART RATE: 73 BPM | SYSTOLIC BLOOD PRESSURE: 130 MMHG | BODY MASS INDEX: 54.84 KG/M2 | DIASTOLIC BLOOD PRESSURE: 76 MMHG | HEIGHT: 59 IN | RESPIRATION RATE: 14 BRPM | TEMPERATURE: 98.6 F | OXYGEN SATURATION: 99 % | WEIGHT: 272 LBS

## 2024-12-03 LAB
GLUCOSE BLD-MCNC: 124 MG/DL (ref 70–99)
PERFORMED ON: ABNORMAL

## 2024-12-03 PROCEDURE — 6360000002 HC RX W HCPCS: Performed by: STUDENT IN AN ORGANIZED HEALTH CARE EDUCATION/TRAINING PROGRAM

## 2024-12-03 PROCEDURE — 7100000010 HC PHASE II RECOVERY - FIRST 15 MIN: Performed by: STUDENT IN AN ORGANIZED HEALTH CARE EDUCATION/TRAINING PROGRAM

## 2024-12-03 PROCEDURE — 3600000002 HC SURGERY LEVEL 2 BASE: Performed by: STUDENT IN AN ORGANIZED HEALTH CARE EDUCATION/TRAINING PROGRAM

## 2024-12-03 PROCEDURE — 2709999900 HC NON-CHARGEABLE SUPPLY: Performed by: STUDENT IN AN ORGANIZED HEALTH CARE EDUCATION/TRAINING PROGRAM

## 2024-12-03 PROCEDURE — 64635 DESTROY LUMB/SAC FACET JNT: CPT | Performed by: STUDENT IN AN ORGANIZED HEALTH CARE EDUCATION/TRAINING PROGRAM

## 2024-12-03 PROCEDURE — 6370000000 HC RX 637 (ALT 250 FOR IP): Performed by: STUDENT IN AN ORGANIZED HEALTH CARE EDUCATION/TRAINING PROGRAM

## 2024-12-03 PROCEDURE — 3600000012 HC SURGERY LEVEL 2 ADDTL 15MIN: Performed by: STUDENT IN AN ORGANIZED HEALTH CARE EDUCATION/TRAINING PROGRAM

## 2024-12-03 PROCEDURE — 64636 DESTROY L/S FACET JNT ADDL: CPT | Performed by: STUDENT IN AN ORGANIZED HEALTH CARE EDUCATION/TRAINING PROGRAM

## 2024-12-03 RX ORDER — LIDOCAINE HYDROCHLORIDE 20 MG/ML
INJECTION, SOLUTION EPIDURAL; INFILTRATION; INTRACAUDAL; PERINEURAL PRN
Status: DISCONTINUED | OUTPATIENT
Start: 2024-12-03 | End: 2024-12-03 | Stop reason: ALTCHOICE

## 2024-12-03 RX ORDER — LIDOCAINE HYDROCHLORIDE 10 MG/ML
INJECTION, SOLUTION EPIDURAL; INFILTRATION; INTRACAUDAL; PERINEURAL PRN
Status: DISCONTINUED | OUTPATIENT
Start: 2024-12-03 | End: 2024-12-03 | Stop reason: ALTCHOICE

## 2024-12-03 RX ORDER — SODIUM CHLORIDE 9 MG/ML
20 INJECTION, SOLUTION INTRAMUSCULAR; INTRAVENOUS; SUBCUTANEOUS
Status: DISCONTINUED | OUTPATIENT
Start: 2024-12-03 | End: 2024-12-03 | Stop reason: HOSPADM

## 2024-12-03 RX ORDER — METHYLPREDNISOLONE ACETATE 80 MG/ML
80 INJECTION, SUSPENSION INTRA-ARTICULAR; INTRALESIONAL; INTRAMUSCULAR; SOFT TISSUE
Status: DISCONTINUED | OUTPATIENT
Start: 2024-12-03 | End: 2024-12-03 | Stop reason: HOSPADM

## 2024-12-03 RX ORDER — LORAZEPAM 1 MG/1
1 TABLET ORAL
Status: COMPLETED | OUTPATIENT
Start: 2024-12-03 | End: 2024-12-03

## 2024-12-03 RX ORDER — BUPIVACAINE HYDROCHLORIDE 2.5 MG/ML
10 INJECTION, SOLUTION EPIDURAL; INFILTRATION; INTRACAUDAL
Status: DISCONTINUED | OUTPATIENT
Start: 2024-12-03 | End: 2024-12-03 | Stop reason: HOSPADM

## 2024-12-03 RX ORDER — DEXAMETHASONE SODIUM PHOSPHATE 10 MG/ML
INJECTION INTRAMUSCULAR; INTRAVENOUS PRN
Status: DISCONTINUED | OUTPATIENT
Start: 2024-12-03 | End: 2024-12-03 | Stop reason: ALTCHOICE

## 2024-12-03 RX ORDER — DEXAMETHASONE SODIUM PHOSPHATE 10 MG/ML
10 INJECTION, SOLUTION INTRAMUSCULAR; INTRAVENOUS
Status: DISCONTINUED | OUTPATIENT
Start: 2024-12-03 | End: 2024-12-03 | Stop reason: HOSPADM

## 2024-12-03 RX ORDER — LIDOCAINE HYDROCHLORIDE 20 MG/ML
10 INJECTION, SOLUTION EPIDURAL; INFILTRATION; INTRACAUDAL; PERINEURAL
Status: DISCONTINUED | OUTPATIENT
Start: 2024-12-03 | End: 2024-12-03 | Stop reason: HOSPADM

## 2024-12-03 RX ORDER — LIDOCAINE HYDROCHLORIDE 10 MG/ML
15 INJECTION, SOLUTION EPIDURAL; INFILTRATION; INTRACAUDAL; PERINEURAL
Status: DISCONTINUED | OUTPATIENT
Start: 2024-12-03 | End: 2024-12-03 | Stop reason: HOSPADM

## 2024-12-03 RX ADMIN — LORAZEPAM 1 MG: 1 TABLET ORAL at 08:59

## 2024-12-03 ASSESSMENT — PAIN DESCRIPTION - DESCRIPTORS: DESCRIPTORS: SORE;ACHING

## 2024-12-03 ASSESSMENT — PAIN - FUNCTIONAL ASSESSMENT: PAIN_FUNCTIONAL_ASSESSMENT: 0-10

## 2024-12-03 ASSESSMENT — PAIN SCALES - GENERAL: PAINLEVEL_OUTOF10: 5

## 2024-12-03 NOTE — PROGRESS NOTES
Pt received from procedure room via wheelchair. Report received from OR nurse. Pt transferred and ambulating without difficulty.  Neuro intact.  Pt tolerated procedure well. No bandaids. Reviewed written discharge instructions with patient. Copy of written discharge instructions given to patient.   Pain 5/10  Neuro Assessment wnl, no numbness or tingling.  Discharge via ambulation with caregiver.

## 2024-12-03 NOTE — PROCEDURES
PROCEDURE NOTE  Date: 12/3/2024   Name: Lakshmi Rebolledo  YOB: 1970                          [facility]  [facility]  [address] [place] [address], [address]  [place], [place], [address]  P: [phone]  F: [phone]    Radiofrequency Ablation        Provider: Ta Guzman DO        Patient Name: Lakshmi Rebolledo : 1970        Date: 24    Lakshmi Rebolledo is here today for interventional pain management.    Preoperatively, the patient presents with facet joint mediated pain, as per history and exam. Patient has failed NSAIDs, PT, and conservative treatment. Patient successfully had two diagnsotic medial branch blocks at the target of today's procedure. Patient has significant psychological and functional impairment due to this condition.    Discussed the risks including but not limited to bleeding, infection, worsened pain, damage to surrounding structures, side effects, toxicity, allergic reactions to medications used, need for surgery, abdominal and visceral injury, as well as catastrophic injury such as vision loss, paralysis, spinal cord or plexus injury, stroke, bowel or bladder incontinence, chest tube insertion, ventilator dependence, and death. Discussed the risks, benefits, and alternatives to the procedure including no procedure at all. Discussed that we cannot undo any permanent neurologic or orthopaedic damage or change the course of any underlying disease. After thorough discussion, patient expressed understanding and willingness to proceed. Written consent was obtained and is in the chart. Verbal consent to proceed was obtained.        PROCEDURE:  Bilateral L4-L5 and L5-S1 medial branch ablation by radiofrequency.      Description of Procedure:  The procedure and potential complications were explained to the patient and a voluntary informed, signed consent was obtained.  The patient was placed prone on the x-ray table and the mid back was prepped

## 2024-12-03 NOTE — DISCHARGE INSTRUCTIONS
directed by your provider.  Unless otherwise instructed by your provider, applying ice on or around your surgical site can be a great way to help minimize pain and swelling after surgery.  Apply ice to the affected area a minimum of 4 times daily for no longer than 15-20 minutes at a time. It is very important to protect your skin by NOT applying ice or ice pack directly to your skin. Always have a towel or pillow case between your skin and the ice. Frozen vegetable packs like peas or corn make great inexpensive alternatives for use as an icepack.    FOLLOW UP CARE - Call your Physician if any of the following occur:  Increased swelling, redness, warmth, hardness around operative area,  Blood soaked dressings (small amounts of oozing may be normal),  Numb, tingling, or cold fingers or toes (for surgeries on extremities)  Fever over 101° F,  Increased drainage, puss, and/or odor from surgical site,  Pain not relieved by medications ordered  Unable to urinate    FLUIDS AND DIET:  An upset stomach or feeling sick (nausea) can commonly occur after surgery and/or pain medication use. To help minimize nausea:  Do not eat a heavy meal soon after your surgery,    Start with water or other clear liquids,  Advance to mild or bland items like Jell-O, dry toast, crackers, etc.,  Avoid caffeine,  Do not drink alcohol for at least 24 hours after surgery,  Your physician may prescribe anti-nausea medication if your nausea continues,  If you are free from nausea for 24hrs, you can advance to your normal diet as tolerated.

## 2024-12-06 ENCOUNTER — TELEPHONE (OUTPATIENT)
Dept: BARIATRICS/WEIGHT MGMT | Age: 54
End: 2024-12-06

## 2024-12-06 DIAGNOSIS — E66.01 MORBID OBESITY WITH BMI OF 50.0-59.9, ADULT: Primary | ICD-10-CM

## 2024-12-06 DIAGNOSIS — E66.01 MORBID OBESITY: Primary | ICD-10-CM

## 2024-12-06 DIAGNOSIS — E66.01 MORBID OBESITY: ICD-10-CM

## 2024-12-06 RX ORDER — PHENTERMINE HYDROCHLORIDE 37.5 MG/1
37.5 TABLET ORAL
Qty: 15 TABLET | Refills: 0 | Status: SHIPPED | OUTPATIENT
Start: 2024-12-06 | End: 2024-12-06

## 2024-12-06 RX ORDER — PHENTERMINE HYDROCHLORIDE 37.5 MG/1
37.5 TABLET ORAL
Qty: 15 TABLET | Refills: 0 | Status: SHIPPED | OUTPATIENT
Start: 2024-12-06 | End: 2025-01-05

## 2024-12-06 RX ORDER — PHENTERMINE HYDROCHLORIDE 37.5 MG/1
37.5 TABLET ORAL
Qty: 15 TABLET | Refills: 0 | OUTPATIENT
Start: 2024-12-06 | End: 2024-12-06 | Stop reason: SDUPTHER

## 2024-12-06 NOTE — TELEPHONE ENCOUNTER
Prescription did not print. Please resign.    Rx requested:  Requested Prescriptions     Pending Prescriptions Disp Refills    phentermine (ADIPEX-P) 37.5 MG tablet 15 tablet 0     Sig: Take 1 tablet by mouth every morning (before breakfast) for 15 days. Max Daily Amount: 37.5 mg         Last Office Visit:   10/17/2024      Next Visit Date:  Future Appointments   Date Time Provider Department Center   12/20/2024  3:45 PM Mitesh Ahn MD [facility]   12/23/2024  1:00 PM ERAN SEVILLA RM 2 RN [facility] [place]   1/6/2025  8:45 AM Ta Guzman DO [facility]   1/10/2025  8:30 AM Candice Rodriguez RD [facility]   1/10/2025  9:15 AM Mitesh Ahn MD [facility]   1/23/2025 10:30 AM Gabi Pack MD [facility]   1/31/2025  8:30 AM Michel Stuart PSYD [facility]   1/31/2025  9:30 AM Candice Rodriguez RD [facility]   1/31/2025 10:15 AM Mitesh Ahn MD [facility]   3/25/2025  9:00 AM Chava Michaud MD [facility]

## 2024-12-06 NOTE — TELEPHONE ENCOUNTER
Patient has started on the liver shrinking diet and is on Topamax twice daily and is finding her appetite increases more at night and is starting to struggle.    Please advise.

## 2024-12-06 NOTE — PROGRESS NOTES
We discussed that phentermine  Phentermine may have unintended side effects. Its side effects can vary from person to person. The most common are dry mouth and trouble sleeping. Serious side effects, such as heart valve disease and high blood pressure in the lung have occurred.      Stone Ahn MD, FACS, [facility]

## 2024-12-20 ENCOUNTER — OFFICE VISIT (OUTPATIENT)
Dept: BARIATRICS/WEIGHT MGMT | Age: 54
End: 2024-12-20
Payer: COMMERCIAL

## 2024-12-20 VITALS
WEIGHT: 260.4 LBS | SYSTOLIC BLOOD PRESSURE: 110 MMHG | OXYGEN SATURATION: 100 % | HEART RATE: 95 BPM | DIASTOLIC BLOOD PRESSURE: 70 MMHG | BODY MASS INDEX: 52.5 KG/M2 | HEIGHT: 59 IN

## 2024-12-20 DIAGNOSIS — E66.01 MORBID OBESITY: Primary | ICD-10-CM

## 2024-12-20 PROCEDURE — 99215 OFFICE O/P EST HI 40 MIN: CPT | Performed by: SURGERY

## 2024-12-20 PROCEDURE — 3078F DIAST BP <80 MM HG: CPT | Performed by: SURGERY

## 2024-12-20 PROCEDURE — 3074F SYST BP LT 130 MM HG: CPT | Performed by: SURGERY

## 2024-12-20 RX ORDER — BUPROPION HYDROCHLORIDE 150 MG/1
150 TABLET ORAL EVERY MORNING
COMMUNITY
Start: 2024-12-16

## 2024-12-20 RX ORDER — SODIUM CHLORIDE, SODIUM LACTATE, POTASSIUM CHLORIDE, CALCIUM CHLORIDE 600; 310; 30; 20 MG/100ML; MG/100ML; MG/100ML; MG/100ML
INJECTION, SOLUTION INTRAVENOUS CONTINUOUS
OUTPATIENT
Start: 2024-12-20

## 2024-12-20 RX ORDER — ONDANSETRON 2 MG/ML
4 INJECTION INTRAMUSCULAR; INTRAVENOUS ONCE
OUTPATIENT
Start: 2024-12-20 | End: 2024-12-20

## 2024-12-20 RX ORDER — ACETAMINOPHEN 325 MG/1
1000 TABLET ORAL ONCE
OUTPATIENT
Start: 2024-12-20 | End: 2024-12-20

## 2024-12-20 RX ORDER — APREPITANT 125 MG/1
125 CAPSULE ORAL ONCE
Qty: 1 CAPSULE | Refills: 0 | Status: SHIPPED | OUTPATIENT
Start: 2024-12-20 | End: 2024-12-20

## 2024-12-20 RX ORDER — SODIUM CHLORIDE 9 MG/ML
INJECTION, SOLUTION INTRAVENOUS PRN
OUTPATIENT
Start: 2024-12-20

## 2024-12-20 RX ORDER — HYDROXYZINE HYDROCHLORIDE 25 MG/1
25 TABLET, FILM COATED ORAL 3 TIMES DAILY PRN
COMMUNITY
Start: 2024-12-16

## 2024-12-20 RX ORDER — HEPARIN SODIUM 5000 [USP'U]/ML
5000 INJECTION, SOLUTION INTRAVENOUS; SUBCUTANEOUS ONCE
OUTPATIENT
Start: 2024-12-20 | End: 2024-12-20

## 2024-12-20 RX ORDER — SODIUM CHLORIDE 0.9 % (FLUSH) 0.9 %
5-40 SYRINGE (ML) INJECTION PRN
OUTPATIENT
Start: 2024-12-20

## 2024-12-20 RX ORDER — POLYETHYLENE GLYCOL 3350, SODIUM CHLORIDE, SODIUM BICARBONATE, POTASSIUM CHLORIDE 420; 11.2; 5.72; 1.48 G/4L; G/4L; G/4L; G/4L
4000 POWDER, FOR SOLUTION ORAL ONCE
Qty: 4000 ML | Refills: 0 | Status: SHIPPED | OUTPATIENT
Start: 2024-12-20 | End: 2024-12-20

## 2024-12-20 RX ORDER — SODIUM CHLORIDE 0.9 % (FLUSH) 0.9 %
5-40 SYRINGE (ML) INJECTION EVERY 12 HOURS SCHEDULED
OUTPATIENT
Start: 2024-12-20

## 2024-12-20 NOTE — PROGRESS NOTES
chronic vitamin and mineral supplementation after surgery to avoid deficiencies in vitamins B1 and/or B12 (which could result in neurologic disorders) and/or iron or calcium deficiencies.    We discussed that nicotine is contraindicated after bariatric surgery due to the risk of marginal ulceration which can result in perforation, stricture, and/or bleeding.       All patients of child bearing age and/or ability are advised to avoid pregnancy for at least 2 years after Bariatric surgery.  Patients with PCOS are advised that oral contraceptives are often not effective in the first 2 years after Bariatric surgery.      We discussed that NSAIDs are contraindicated with gastric bypass anatomy due to risk of marginal ulcer formation and perforation.  This does not include baby aspirin (81 mg a day).      More than 40 minutes were spent in face-to-face interaction with patient majority of which were spent in care coordination of the above.  Lakshmi Rebolledo   has expressed interest in laparoscopic or robotic Joelle en Y gastric bypass       Mitesh Ahn MD, FASMBS, FACS, MBSAQIP Verified Surgeon  LCDR-USN-RET, Diplomate of The American Board of Surgery

## 2024-12-30 DIAGNOSIS — E55.9 VITAMIN D DEFICIENCY: ICD-10-CM

## 2024-12-30 RX ORDER — ERGOCALCIFEROL 1.25 MG/1
50000 CAPSULE, LIQUID FILLED ORAL WEEKLY
Qty: 6 CAPSULE | Refills: 0 | OUTPATIENT
Start: 2024-12-30 | End: 2025-02-10

## 2024-12-30 NOTE — TELEPHONE ENCOUNTER
Comments:     Last Office Visit (last PCP visit):   11/25/2024    Next Visit Date:  Future Appointments   Date Time Provider Department Center   1/6/2025  8:45 AM Ta Guzman DO [facility]   1/8/2025  1:00 PM ERAN SEVILLA RM 2 RN [facility] [place]   1/23/2025  8:30 AM Candice Rodriguez RD [facility]   1/23/2025  9:15 AM Mitesh Ahn MD [facility]   1/23/2025 10:30 AM Gabi Pack MD [facility]   2/19/2025  8:30 AM Michel Stuart PSYD [facility]   2/19/2025  9:30 AM Mitesh Ahn MD [facility]   2/19/2025 10:00 AM Candice Rodriguez RD [facility]   3/25/2025  9:00 AM Chava Michaud MD [facility]       **If hasn't been seen in over a year OR hasn't followed up according to last diabetes/ADHD visit, make appointment for patient before sending refill to provider.    Rx requested:  Requested Prescriptions     Pending Prescriptions Disp Refills    vitamin D (ERGOCALCIFEROL) 1.25 MG (80164 UT) CAPS capsule [Pharmacy Med Name: Vitamin D (Ergocalciferol) 1.25 MG (07607 UT) Oral Capsule] 6 capsule 0     Sig: Take 1 capsule by mouth once a week

## 2025-01-03 ENCOUNTER — TELEPHONE (OUTPATIENT)
Dept: DIABETES SERVICES | Age: 55
End: 2025-01-03

## 2025-01-03 NOTE — PROGRESS NOTES
https://www.diabeteseducator.org/docs/default-source/practice/educator-tools/non-insulin-agents-cost-saving-resource-7-29-19.pdf?sfvrsn=2  [] Insulin cost saving resource  https://www.diabeteseducator.org/docs/default-source/practice/educator-tools/insulin-cost-saving-resources-3-4-19.pdf?sfvrsn=4  []  GetInsulin.org  []  The Affordable insulin Project http://affordableinsulinproject.org/    []  Diabetes Websites - Use as a resource for additional information  [] www.diabetes.org  [] [employer]  [] WWW.diabeteseducator.org     Association of Diabetes Care & Education Specialists  [] www.niddk.nih.gov/health-information/diabetes/overview  [employer] of Health  [x] www.medicalTerra[employer].org  - offers emergency medical information service, including an ID bracelet/pendant (have to pay)    []   Journals/Magazines  [] Diabetes Forecast 1-852.773.8950; www.diabetesforecast.org  [] Diabetes Self-Management 1-704.927.4036; www.diabetesselTin [employer] Industries."[employer]  [] Diabetes Health 588-038-5180; www.diabeteshealth.com    []   Other  [] Health Program offered at place of employment   [] Insurance Company's Chronic Disease Management Program  [] Follow up - Diabetes Education or Nutrition Therapy Annually (call in 1 year to set up apt)  [] Join the Living with Type 2 Diabetes Program (FREE)   www.diabetes.org/diabetes/type-2/living-with-type-2-diabetes-program  or call 1-800-DIABETES  [] Stop smoking - www.cancer.org/healthy/stay-away-from-tobacco       Post Education Referrals:        [] [place] Tobacco Quit information sheet and 1800 QUIT NOW , [phone]- [phone]      [] Dental care     [] Podiatrist     [] Ophthalmologist     [] Other    Suzette Henson RN

## 2025-01-10 DIAGNOSIS — E66.01 MORBID OBESITY: ICD-10-CM

## 2025-01-10 RX ORDER — PHENTERMINE HYDROCHLORIDE 37.5 MG/1
37.5 TABLET ORAL
Qty: 30 TABLET | Refills: 0 | Status: CANCELLED | OUTPATIENT
Start: 2025-01-10 | End: 2025-02-09

## 2025-01-10 RX ORDER — TOPIRAMATE 50 MG/1
50 TABLET, FILM COATED ORAL 2 TIMES DAILY
Qty: 60 TABLET | Refills: 1 | Status: CANCELLED | OUTPATIENT
Start: 2025-01-10

## 2025-01-15 DIAGNOSIS — E55.9 VITAMIN D DEFICIENCY: ICD-10-CM

## 2025-01-15 RX ORDER — ACETAMINOPHEN 160 MG
2000 TABLET,DISINTEGRATING ORAL DAILY
Qty: 90 CAPSULE | Refills: 1 | Status: SHIPPED | OUTPATIENT
Start: 2025-01-15

## 2025-01-15 NOTE — TELEPHONE ENCOUNTER
Comments:     Last Office Visit (last PCP visit):   11/25/2024    Next Visit Date:  Future Appointments   Date Time Provider Department Center   1/23/2025 10:30 AM Gabi Pack MD [facility]   3/25/2025  9:00 AM Chava Michaud MD [facility]       **If hasn't been seen in over a year OR hasn't followed up according to last diabetes/ADHD visit, make appointment for patient before sending refill to provider.    Rx requested:  Requested Prescriptions     Pending Prescriptions Disp Refills    vitamin D (VITAMIN D3) 50 MCG (2000 UT) CAPS capsule 90 capsule 1     Sig: Take 1 capsule by mouth daily

## 2025-01-24 ENCOUNTER — TELEPHONE (OUTPATIENT)
Dept: BARIATRICS/WEIGHT MGMT | Age: 55
End: 2025-01-24

## 2025-01-24 ENCOUNTER — OFFICE VISIT (OUTPATIENT)
Dept: FAMILY MEDICINE CLINIC | Age: 55
End: 2025-01-24

## 2025-01-24 VITALS
SYSTOLIC BLOOD PRESSURE: 138 MMHG | OXYGEN SATURATION: 96 % | HEIGHT: 58 IN | DIASTOLIC BLOOD PRESSURE: 70 MMHG | TEMPERATURE: 98.8 F | WEIGHT: 263 LBS | BODY MASS INDEX: 55.2 KG/M2 | HEART RATE: 79 BPM

## 2025-01-24 DIAGNOSIS — Z85.42: ICD-10-CM

## 2025-01-24 DIAGNOSIS — F32.1 CURRENT MODERATE EPISODE OF MAJOR DEPRESSIVE DISORDER WITHOUT PRIOR EPISODE (HCC): ICD-10-CM

## 2025-01-24 DIAGNOSIS — Z13.0 SCREENING, ANEMIA, DEFICIENCY, IRON: ICD-10-CM

## 2025-01-24 DIAGNOSIS — R80.9 TYPE 2 DIABETES MELLITUS WITH MICROALBUMINURIA, WITHOUT LONG-TERM CURRENT USE OF INSULIN (HCC): Primary | ICD-10-CM

## 2025-01-24 DIAGNOSIS — E11.29 TYPE 2 DIABETES MELLITUS WITH MICROALBUMINURIA, WITHOUT LONG-TERM CURRENT USE OF INSULIN (HCC): Primary | ICD-10-CM

## 2025-01-24 DIAGNOSIS — R79.89 ABNORMAL LFTS (LIVER FUNCTION TESTS): ICD-10-CM

## 2025-01-24 DIAGNOSIS — E11.59 HYPERTENSION ASSOCIATED WITH DIABETES (HCC): ICD-10-CM

## 2025-01-24 DIAGNOSIS — N39.0 RECURRENT UTI: ICD-10-CM

## 2025-01-24 DIAGNOSIS — M54.50 CHRONIC MIDLINE LOW BACK PAIN WITHOUT SCIATICA: ICD-10-CM

## 2025-01-24 DIAGNOSIS — F32.9 MAJOR DEPRESSIVE DISORDER, REMISSION STATUS UNSPECIFIED, UNSPECIFIED WHETHER RECURRENT: ICD-10-CM

## 2025-01-24 DIAGNOSIS — Z76.89 ENCOUNTER TO ESTABLISH CARE: ICD-10-CM

## 2025-01-24 DIAGNOSIS — E66.01 OBESITY, MORBID, BMI 50 OR HIGHER: ICD-10-CM

## 2025-01-24 DIAGNOSIS — G89.29 CHRONIC MIDLINE LOW BACK PAIN WITHOUT SCIATICA: ICD-10-CM

## 2025-01-24 DIAGNOSIS — I10 PRIMARY HYPERTENSION: ICD-10-CM

## 2025-01-24 DIAGNOSIS — I15.2 HYPERTENSION ASSOCIATED WITH DIABETES (HCC): ICD-10-CM

## 2025-01-24 PROBLEM — G47.10 HYPERSOMNIA: Status: RESOLVED | Noted: 2024-06-14 | Resolved: 2025-01-24

## 2025-01-24 PROBLEM — M25.562 CHRONIC PAIN OF BOTH KNEES: Status: RESOLVED | Noted: 2024-03-28 | Resolved: 2025-01-24

## 2025-01-24 PROBLEM — E11.65 TYPE 2 DIABETES MELLITUS WITH HYPERGLYCEMIA, WITHOUT LONG-TERM CURRENT USE OF INSULIN (HCC): Status: RESOLVED | Noted: 2025-01-24 | Resolved: 2025-01-24

## 2025-01-24 PROBLEM — Z87.19 HISTORY OF RECTAL POLYPS: Chronic | Status: RESOLVED | Noted: 2024-07-10 | Resolved: 2025-01-24

## 2025-01-24 PROBLEM — R93.89 THICKENED ENDOMETRIUM: Status: RESOLVED | Noted: 2024-07-15 | Resolved: 2025-01-24

## 2025-01-24 PROBLEM — D75.89 MACROCYTOSIS: Status: RESOLVED | Noted: 2024-03-29 | Resolved: 2025-01-24

## 2025-01-24 PROBLEM — N95.0 POSTMENOPAUSAL BLEEDING: Status: RESOLVED | Noted: 2024-03-28 | Resolved: 2025-01-24

## 2025-01-24 PROBLEM — N95.0 PMB (POSTMENOPAUSAL BLEEDING): Status: RESOLVED | Noted: 2024-05-17 | Resolved: 2025-01-24

## 2025-01-24 PROBLEM — R06.09 DYSPNEA ON EXERTION: Status: RESOLVED | Noted: 2024-03-28 | Resolved: 2025-01-24

## 2025-01-24 PROBLEM — F43.10 POST-TRAUMATIC STRESS DISORDER, UNSPECIFIED: Status: RESOLVED | Noted: 2025-01-24 | Resolved: 2025-01-24

## 2025-01-24 PROBLEM — D72.829 LEUKOCYTOSIS: Status: RESOLVED | Noted: 2024-03-29 | Resolved: 2025-01-24

## 2025-01-24 PROBLEM — Z87.42 HISTORY OF ABNORMAL CERVICAL PAP SMEAR: Status: RESOLVED | Noted: 2024-03-28 | Resolved: 2025-01-24

## 2025-01-24 PROBLEM — M25.561 CHRONIC PAIN OF BOTH KNEES: Status: RESOLVED | Noted: 2024-03-28 | Resolved: 2025-01-24

## 2025-01-24 PROBLEM — N63.10 MASS OF MULTIPLE SITES OF RIGHT BREAST: Status: RESOLVED | Noted: 2024-05-20 | Resolved: 2025-01-24

## 2025-01-24 PROBLEM — F43.10 POST-TRAUMATIC STRESS DISORDER, UNSPECIFIED: Status: ACTIVE | Noted: 2025-01-24

## 2025-01-24 PROBLEM — K20.90 ESOPHAGITIS DETERMINED BY ENDOSCOPY: Status: RESOLVED | Noted: 2024-07-10 | Resolved: 2025-01-24

## 2025-01-24 PROBLEM — J98.4 RESTRICTIVE LUNG DISEASE: Status: RESOLVED | Noted: 2024-05-20 | Resolved: 2025-01-24

## 2025-01-24 PROBLEM — E11.65 TYPE 2 DIABETES MELLITUS WITH HYPERGLYCEMIA, WITHOUT LONG-TERM CURRENT USE OF INSULIN (HCC): Status: ACTIVE | Noted: 2025-01-24

## 2025-01-24 RX ORDER — METFORMIN HYDROCHLORIDE 500 MG/1
500 TABLET, EXTENDED RELEASE ORAL
Qty: 90 TABLET | Refills: 1 | Status: SHIPPED | OUTPATIENT
Start: 2025-01-24

## 2025-01-24 RX ORDER — ATORVASTATIN CALCIUM 10 MG/1
10 TABLET, FILM COATED ORAL DAILY
Qty: 90 TABLET | Refills: 1 | Status: SHIPPED | OUTPATIENT
Start: 2025-01-24

## 2025-01-24 RX ORDER — BUPROPION HYDROCHLORIDE 150 MG/1
150 TABLET ORAL EVERY MORNING
Qty: 90 TABLET | Refills: 1 | Status: SHIPPED | OUTPATIENT
Start: 2025-01-24

## 2025-01-24 RX ORDER — DULOXETIN HYDROCHLORIDE 60 MG/1
60 CAPSULE, DELAYED RELEASE ORAL DAILY
Qty: 90 CAPSULE | Refills: 1 | Status: SHIPPED | OUTPATIENT
Start: 2025-01-24

## 2025-01-24 RX ORDER — LOSARTAN POTASSIUM 25 MG/1
25 TABLET ORAL DAILY
Qty: 90 TABLET | Refills: 1 | Status: SHIPPED | OUTPATIENT
Start: 2025-01-24

## 2025-01-24 SDOH — ECONOMIC STABILITY: FOOD INSECURITY: WITHIN THE PAST 12 MONTHS, THE FOOD YOU BOUGHT JUST DIDN'T LAST AND YOU DIDN'T HAVE MONEY TO GET MORE.: NEVER TRUE

## 2025-01-24 SDOH — HEALTH STABILITY: PHYSICAL HEALTH: ON AVERAGE, HOW MANY MINUTES DO YOU ENGAGE IN EXERCISE AT THIS LEVEL?: 10 MIN

## 2025-01-24 SDOH — ECONOMIC STABILITY: FOOD INSECURITY: WITHIN THE PAST 12 MONTHS, YOU WORRIED THAT YOUR FOOD WOULD RUN OUT BEFORE YOU GOT MONEY TO BUY MORE.: NEVER TRUE

## 2025-01-24 SDOH — HEALTH STABILITY: PHYSICAL HEALTH: ON AVERAGE, HOW MANY DAYS PER WEEK DO YOU ENGAGE IN MODERATE TO STRENUOUS EXERCISE (LIKE A BRISK WALK)?: 0 DAYS

## 2025-01-24 ASSESSMENT — PATIENT HEALTH QUESTIONNAIRE - PHQ9
1. LITTLE INTEREST OR PLEASURE IN DOING THINGS: NEARLY EVERY DAY
6. FEELING BAD ABOUT YOURSELF - OR THAT YOU ARE A FAILURE OR HAVE LET YOURSELF OR YOUR FAMILY DOWN: SEVERAL DAYS
SUM OF ALL RESPONSES TO PHQ9 QUESTIONS 1 & 2: 6
10. IF YOU CHECKED OFF ANY PROBLEMS, HOW DIFFICULT HAVE THESE PROBLEMS MADE IT FOR YOU TO DO YOUR WORK, TAKE CARE OF THINGS AT HOME, OR GET ALONG WITH OTHER PEOPLE: SOMEWHAT DIFFICULT
7. TROUBLE CONCENTRATING ON THINGS, SUCH AS READING THE NEWSPAPER OR WATCHING TELEVISION: SEVERAL DAYS
SUM OF ALL RESPONSES TO PHQ QUESTIONS 1-9: 14
4. FEELING TIRED OR HAVING LITTLE ENERGY: NEARLY EVERY DAY
SUM OF ALL RESPONSES TO PHQ QUESTIONS 1-9: 14
5. POOR APPETITE OR OVEREATING: SEVERAL DAYS
9. THOUGHTS THAT YOU WOULD BE BETTER OFF DEAD, OR OF HURTING YOURSELF: NOT AT ALL
2. FEELING DOWN, DEPRESSED OR HOPELESS: NEARLY EVERY DAY
SUM OF ALL RESPONSES TO PHQ QUESTIONS 1-9: 14
SUM OF ALL RESPONSES TO PHQ QUESTIONS 1-9: 14
3. TROUBLE FALLING OR STAYING ASLEEP: MORE THAN HALF THE DAYS

## 2025-01-24 ASSESSMENT — COLUMBIA-SUICIDE SEVERITY RATING SCALE - C-SSRS
1. WITHIN THE PAST MONTH, HAVE YOU WISHED YOU WERE DEAD OR WISHED YOU COULD GO TO SLEEP AND NOT WAKE UP?: NO
2. HAVE YOU ACTUALLY HAD ANY THOUGHTS OF KILLING YOURSELF?: NO
6. HAVE YOU EVER DONE ANYTHING, STARTED TO DO ANYTHING, OR PREPARED TO DO ANYTHING TO END YOUR LIFE?: NO

## 2025-01-24 ASSESSMENT — ENCOUNTER SYMPTOMS
BLOOD IN STOOL: 0
CONSTIPATION: 0
DIARRHEA: 0
COUGH: 0
CHEST TIGHTNESS: 0
VOICE CHANGE: 0
NAUSEA: 0
ABDOMINAL PAIN: 0
SHORTNESS OF BREATH: 0
WHEEZING: 0
SINUS PAIN: 0

## 2025-01-24 NOTE — TELEPHONE ENCOUNTER
Insurance Verification for Weight Loss Surgery     Mitesh RAY # [id number]   Tax ID # [id number]     Date: 01/24/2025 Time: 1427  Insurance Company: [facility] Plan (Exchange) Phone: [phone]  Insurance ID: [id number]  Representative that I spoke with: Alhaji NATHAN   Call Reference #: [id number]     Bariatric Benefit CPT Code: Sleeve 65447 Bypass [address]655 Dx: E66.01     These codes are not covered under this patients plan.

## 2025-01-24 NOTE — PROGRESS NOTES
[facility] PRIMARY CARE  [address]  [address]  [place] [address]  Dept: [phone]  Dept Fax: [phone]  Loc: [phone]     1/24/2025    Visit type: Office visit    The patient (or guardian, if applicable) and other individuals in attendance with the patient were advised that Artificial Intelligence will be utilized during this visit to record, process the conversation to generate a clinical note, and support improvement of the AI technology. The patient (or guardian, if applicable) and other individuals in attendance at the appointment consented to the use of AI, including the recording.      Reason for Visit: Establish Care (Pt establishing care today. Previous PCP Dr. Silvestre)       ASSESSMENT/PLAN     Assessment & Plan  1. Depression.  She reports feeling excessively tired and disinterested in activities since running out of her medication. A prescription for bupropion has been sent to her pharmacy. She is advised to continue her appointments with the [facility].    2. Diabetes Mellitus.  Her blood sugars are between 160 and 180. She is currently taking Metformin and Jardiance 10 mg. She is advised to cut down carbohydrates by 90% and avoid sugar intake. Regular cardio exercise is recommended. A prescription for Jardiance 10 mg has been sent to her pharmacy. She is also advised to resume Ozempic, which will assist with weight loss and diabetes management. A partial blood work order has been placed to recheck her sugar levels.    3. Hypertension.  Her blood pressure is well-controlled. She is advised to continue her current medication regimen and reduce salt intake.    4. Elevated Liver Functions.  Her liver functions are slightly elevated, likely due to fatty liver from obesity and diabetes. She is advised to cut down on alcohol, follow a healthy diet, and engage in regular exercise.    5. Uterine Cancer.  She has a history of precancerous cells and an IUD

## 2025-01-28 NOTE — TELEPHONE ENCOUNTER
Medication request cancelled as patient is no longer scheduled for surgery and no longer continuing with bariatric program due to known bariatric coverage with insurance.

## 2025-02-24 ENCOUNTER — HOSPITAL ENCOUNTER (OUTPATIENT)
Dept: LAB | Age: 55
Discharge: HOME | End: 2025-02-24
Payer: COMMERCIAL

## 2025-02-24 DIAGNOSIS — R80.9 TYPE 2 DIABETES MELLITUS WITH MICROALBUMINURIA, WITHOUT LONG-TERM CURRENT USE OF INSULIN (HCC): ICD-10-CM

## 2025-02-24 DIAGNOSIS — Z13.0 SCREENING, ANEMIA, DEFICIENCY, IRON: ICD-10-CM

## 2025-02-24 DIAGNOSIS — E11.29 TYPE 2 DIABETES MELLITUS WITH MICROALBUMINURIA, WITHOUT LONG-TERM CURRENT USE OF INSULIN (HCC): ICD-10-CM

## 2025-02-24 DIAGNOSIS — N39.0 RECURRENT UTI: ICD-10-CM

## 2025-02-24 LAB
BACTERIA URNS QL MICRO: ABNORMAL /HPF
BASOPHILS # BLD: 0.1 K/UL (ref 0–0.2)
BASOPHILS NFR BLD: 0.7 %
BILIRUB UR QL STRIP: NEGATIVE
CLARITY UR: ABNORMAL
COLOR UR: YELLOW
CREAT UR-MCNC: 70.9 MG/DL
EOSINOPHIL # BLD: 0.4 K/UL (ref 0–0.7)
EOSINOPHIL NFR BLD: 2.8 %
EPI CELLS #/AREA URNS AUTO: ABNORMAL /HPF (ref 0–5)
ERYTHROCYTE [DISTWIDTH] IN BLOOD BY AUTOMATED COUNT: 13.1 % (ref 11.5–14.5)
GLUCOSE UR STRIP-MCNC: >=1000 MG/DL
HCT VFR BLD AUTO: 51.8 % (ref 37–47)
HGB BLD-MCNC: 16 G/DL (ref 12–16)
HGB UR QL STRIP: ABNORMAL
HYALINE CASTS #/AREA URNS AUTO: ABNORMAL /HPF (ref 0–5)
KETONES UR STRIP-MCNC: NEGATIVE MG/DL
LEUKOCYTE ESTERASE UR QL STRIP: NEGATIVE
LYMPHOCYTES # BLD: 2.4 K/UL (ref 1–4.8)
LYMPHOCYTES NFR BLD: 17.9 %
MCH RBC QN AUTO: 31.4 PG (ref 27–31.3)
MCHC RBC AUTO-ENTMCNC: 30.9 % (ref 33–37)
MCV RBC AUTO: 101.8 FL (ref 79.4–94.8)
MICROALBUMIN UR-MCNC: 1.8 MG/DL
MICROALBUMIN/CREAT UR-RTO: 25.4 MG/G (ref 0–30)
MONOCYTES # BLD: 0.9 K/UL (ref 0.2–0.8)
MONOCYTES NFR BLD: 6.5 %
NEUTROPHILS # BLD: 9.5 K/UL (ref 1.4–6.5)
NEUTS SEG NFR BLD: 71.4 %
NITRITE UR QL STRIP: POSITIVE
PH UR STRIP: 6 [PH] (ref 5–9)
PLATELET # BLD AUTO: 260 K/UL (ref 130–400)
PROT UR STRIP-MCNC: NEGATIVE MG/DL
RBC # BLD AUTO: 5.09 M/UL (ref 4.2–5.4)
RBC #/AREA URNS AUTO: ABNORMAL /HPF (ref 0–5)
SP GR UR STRIP: 1.02 (ref 1–1.03)
UROBILINOGEN UR STRIP-ACNC: 0.2 E.U./DL
WBC # BLD AUTO: 13.3 K/UL (ref 4.8–10.8)
WBC #/AREA URNS AUTO: ABNORMAL /HPF (ref 0–5)

## 2025-02-24 PROCEDURE — 36415 COLL VENOUS BLD VENIPUNCTURE: CPT

## 2025-02-24 PROCEDURE — 85025 COMPLETE CBC W/AUTO DIFF WBC: CPT

## 2025-02-24 PROCEDURE — 87077 CULTURE AEROBIC IDENTIFY: CPT

## 2025-02-24 PROCEDURE — 87086 URINE CULTURE/COLONY COUNT: CPT

## 2025-02-24 PROCEDURE — 82570 ASSAY OF URINE CREATININE: CPT

## 2025-02-24 PROCEDURE — 82043 UR ALBUMIN QUANTITATIVE: CPT

## 2025-02-24 PROCEDURE — 87186 SC STD MICRODIL/AGAR DIL: CPT

## 2025-02-24 PROCEDURE — 81001 URINALYSIS AUTO W/SCOPE: CPT

## 2025-02-24 PROCEDURE — 83036 HEMOGLOBIN GLYCOSYLATED A1C: CPT

## 2025-02-25 LAB
ESTIMATED AVERAGE GLUCOSE: 128 MG/DL
HBA1C MFR BLD: 6.1 % (ref 4–6)

## 2025-02-26 DIAGNOSIS — N39.0 RECURRENT UTI: Primary | ICD-10-CM

## 2025-02-26 LAB
BACTERIA UR CULT: ABNORMAL
BACTERIA UR CULT: ABNORMAL
ORGANISM: ABNORMAL

## 2025-02-26 RX ORDER — NITROFURANTOIN 25; 75 MG/1; MG/1
100 CAPSULE ORAL 2 TIMES DAILY
Qty: 20 CAPSULE | Refills: 0 | Status: SHIPPED | OUTPATIENT
Start: 2025-02-26 | End: 2025-03-08

## 2025-04-10 ENCOUNTER — TELEPHONE (OUTPATIENT)
Dept: FAMILY MEDICINE CLINIC | Age: 55
End: 2025-04-10

## 2025-04-10 ENCOUNTER — TELEPHONE (OUTPATIENT)
Age: 55
End: 2025-04-10

## 2025-04-10 NOTE — TELEPHONE ENCOUNTER
PATIENT IS CALLING TO REQUEST A LETTER THAT SHOWS SHE IS DISABLED   THIS LETTER WOULD BE FOR [employer].  PATIENT DOES NOT HAVE INSURANCE RIGHT NOW AND [employer] WILL NOT APPROVE HER MEDICAID UNTIL THEY HAVE THIS.  DO YOU NEED TO SEE PATIENT FOR THIS   PLEASE ADVISE

## 2025-04-10 NOTE — TELEPHONE ENCOUNTER
Pt calling in requesting a letter from PCP stating she is disabled and can't work and needs to turn it in for [employer]. Please advise.    Ph: [phone]

## 2025-04-21 ENCOUNTER — OFFICE VISIT (OUTPATIENT)
Dept: FAMILY MEDICINE CLINIC | Age: 55
End: 2025-04-21

## 2025-04-21 VITALS
WEIGHT: 269 LBS | HEIGHT: 59 IN | SYSTOLIC BLOOD PRESSURE: 130 MMHG | TEMPERATURE: 97.7 F | BODY MASS INDEX: 54.23 KG/M2 | DIASTOLIC BLOOD PRESSURE: 74 MMHG | HEART RATE: 81 BPM

## 2025-04-21 DIAGNOSIS — I15.2 HYPERTENSION ASSOCIATED WITH DIABETES (HCC): ICD-10-CM

## 2025-04-21 DIAGNOSIS — F32.9 MAJOR DEPRESSIVE DISORDER, REMISSION STATUS UNSPECIFIED, UNSPECIFIED WHETHER RECURRENT: ICD-10-CM

## 2025-04-21 DIAGNOSIS — E11.29 TYPE 2 DIABETES MELLITUS WITH MICROALBUMINURIA, WITHOUT LONG-TERM CURRENT USE OF INSULIN (HCC): Primary | ICD-10-CM

## 2025-04-21 DIAGNOSIS — R80.9 TYPE 2 DIABETES MELLITUS WITH MICROALBUMINURIA, WITHOUT LONG-TERM CURRENT USE OF INSULIN (HCC): Primary | ICD-10-CM

## 2025-04-21 DIAGNOSIS — E66.01 MORBID (SEVERE) OBESITY DUE TO EXCESS CALORIES (HCC): ICD-10-CM

## 2025-04-21 DIAGNOSIS — L30.4 INTERTRIGO: ICD-10-CM

## 2025-04-21 DIAGNOSIS — E11.59 HYPERTENSION ASSOCIATED WITH DIABETES (HCC): ICD-10-CM

## 2025-04-21 PROBLEM — R79.89 ABNORMAL LFTS (LIVER FUNCTION TESTS): Status: RESOLVED | Noted: 2024-03-28 | Resolved: 2025-04-21

## 2025-04-21 PROBLEM — N39.0 RECURRENT UTI: Status: RESOLVED | Noted: 2025-01-24 | Resolved: 2025-04-21

## 2025-04-21 PROBLEM — S32.030D CLOSED WEDGE COMPRESSION FRACTURE OF L3 VERTEBRA WITH ROUTINE HEALING: Status: RESOLVED | Noted: 2024-04-04 | Resolved: 2025-04-21

## 2025-04-21 PROBLEM — Z76.89 ENCOUNTER TO ESTABLISH CARE: Status: RESOLVED | Noted: 2025-01-24 | Resolved: 2025-04-21

## 2025-04-21 PROBLEM — D75.1 POLYCYTHEMIA: Status: RESOLVED | Noted: 2024-08-19 | Resolved: 2025-04-21

## 2025-04-21 PROCEDURE — 3078F DIAST BP <80 MM HG: CPT | Performed by: INTERNAL MEDICINE

## 2025-04-21 PROCEDURE — 3044F HG A1C LEVEL LT 7.0%: CPT | Performed by: INTERNAL MEDICINE

## 2025-04-21 PROCEDURE — 99214 OFFICE O/P EST MOD 30 MIN: CPT | Performed by: INTERNAL MEDICINE

## 2025-04-21 PROCEDURE — 3075F SYST BP GE 130 - 139MM HG: CPT | Performed by: INTERNAL MEDICINE

## 2025-04-21 RX ORDER — DULOXETIN HYDROCHLORIDE 60 MG/1
60 CAPSULE, DELAYED RELEASE ORAL DAILY
Qty: 90 CAPSULE | Refills: 1 | Status: SHIPPED | OUTPATIENT
Start: 2025-04-21

## 2025-04-21 RX ORDER — HYDROXYZINE HYDROCHLORIDE 25 MG/1
25 TABLET, FILM COATED ORAL 3 TIMES DAILY PRN
COMMUNITY
Start: 2025-02-27 | End: 2025-04-21

## 2025-04-21 RX ORDER — BUPROPION HYDROCHLORIDE 300 MG/1
300 TABLET ORAL EVERY MORNING
COMMUNITY
Start: 2025-02-27

## 2025-04-21 RX ORDER — NYSTATIN 100000 [USP'U]/G
POWDER TOPICAL
Qty: 60 G | Refills: 1 | Status: SHIPPED | OUTPATIENT
Start: 2025-04-21

## 2025-04-21 ASSESSMENT — ENCOUNTER SYMPTOMS
SINUS PAIN: 0
BLOOD IN STOOL: 0
SHORTNESS OF BREATH: 0
ABDOMINAL PAIN: 0
WHEEZING: 0
CHEST TIGHTNESS: 0
COUGH: 0
VOICE CHANGE: 0
DIARRHEA: 0
NAUSEA: 0
CONSTIPATION: 0

## 2025-04-21 NOTE — PROGRESS NOTES
[facility]  [address]   [address]  [facility] [address]  Dept: [phone]  Dept Fax: [phone]  Loc: [phone]     4/21/2025    Visit type: Follow up    The patient (or guardian, if applicable) and other individuals in attendance with the patient were advised that Artificial Intelligence will be utilized during this visit to record, process the conversation to generate a clinical note, and support improvement of the AI technology. The patient (or guardian, if applicable) and other individuals in attendance at the appointment consented to the use of AI, including the recording.      Reason for Visit: Follow-up (Discuss increasing Ozempic ) and Forms (Needs referred to occ therapy for FCE )       ASSESSMENT/PLAN     Assessment & Plan  1. Weight management.  - Insurance did not cover weight loss surgery; attempting to obtain Medicare/Medicaid.  - Weight loss from 315 lbs to 265 lbs noted with Ozempic.  - Semaglutide prescribed from [facility] pharmacy at $175/month; dosage to start low and increase monthly.  - Follow-up in 1 month for weight management.    2. Diabetes mellitus.  - Restarted Ozempic per Dr. Auguste' recommendation.  - Prescription for Ozempic sent to compound pharmacy.  - Blood sugar levels to be monitored; report significant changes.  - Follow-up in 1 month for diabetes management.    3. Chronic pain.  - Cymbalta prescribed for pain management, mood stabilization, and potential weight loss benefits.  - Gabapentin will not be refilled due to lack of chronic nerve pain diagnosis.  - Patient reports stinging pain in back and knee pain.  - Evaluation of pain management effectiveness with Cymbalta.  She was seen by pain management in the past strongly advised to follow-up with pain management    4. Advanced directives.  - Discussion about enrolling in Social Security to facilitate physical examination for weight loss surgery.  - Emphasis on

## 2025-05-13 ENCOUNTER — OFFICE VISIT (OUTPATIENT)
Age: 55
End: 2025-05-13
Payer: MEDICAID

## 2025-05-13 VITALS — WEIGHT: 270.4 LBS | BODY MASS INDEX: 53.09 KG/M2 | HEIGHT: 60 IN

## 2025-05-13 DIAGNOSIS — E11.65 TYPE 2 DIABETES MELLITUS WITH HYPERGLYCEMIA, WITHOUT LONG-TERM CURRENT USE OF INSULIN (HCC): ICD-10-CM

## 2025-05-13 DIAGNOSIS — F43.10 PTSD (POST-TRAUMATIC STRESS DISORDER): ICD-10-CM

## 2025-05-13 DIAGNOSIS — M25.562 CHRONIC PAIN OF BOTH KNEES: ICD-10-CM

## 2025-05-13 DIAGNOSIS — Z71.3 DIETARY COUNSELING AND SURVEILLANCE: ICD-10-CM

## 2025-05-13 DIAGNOSIS — E66.01 PSYCHOLOGICAL FACTORS AFFECTING MORBID OBESITY (HCC): ICD-10-CM

## 2025-05-13 DIAGNOSIS — Z01.818 PREOPERATIVE TESTING: Primary | ICD-10-CM

## 2025-05-13 DIAGNOSIS — M47.816 LUMBAR SPONDYLOSIS: ICD-10-CM

## 2025-05-13 DIAGNOSIS — I10 PRIMARY HYPERTENSION: ICD-10-CM

## 2025-05-13 DIAGNOSIS — G89.29 CHRONIC PAIN OF BOTH KNEES: ICD-10-CM

## 2025-05-13 DIAGNOSIS — M25.561 CHRONIC PAIN OF BOTH KNEES: ICD-10-CM

## 2025-05-13 DIAGNOSIS — K20.90 ESOPHAGITIS DETERMINED BY ENDOSCOPY: ICD-10-CM

## 2025-05-13 DIAGNOSIS — F39 UNSPECIFIED MOOD (AFFECTIVE) DISORDER: ICD-10-CM

## 2025-05-13 DIAGNOSIS — Z87.891 HISTORY OF NICOTINE USE: ICD-10-CM

## 2025-05-13 DIAGNOSIS — E11.65 TYPE 2 DIABETES MELLITUS WITH HYPERGLYCEMIA, WITHOUT LONG-TERM CURRENT USE OF INSULIN (HCC): Primary | ICD-10-CM

## 2025-05-13 DIAGNOSIS — F54 PSYCHOLOGICAL FACTORS AFFECTING MORBID OBESITY (HCC): ICD-10-CM

## 2025-05-13 DIAGNOSIS — E66.813 CLASS 3 SEVERE OBESITY DUE TO EXCESS CALORIES WITH SERIOUS COMORBIDITY AND BODY MASS INDEX (BMI) OF 50.0 TO 59.9 IN ADULT (HCC): ICD-10-CM

## 2025-05-13 DIAGNOSIS — E66.01 PSYCHOLOGICAL FACTORS AFFECTING MORBID OBESITY (HCC): Primary | ICD-10-CM

## 2025-05-13 DIAGNOSIS — G47.33 OSA (OBSTRUCTIVE SLEEP APNEA): ICD-10-CM

## 2025-05-13 DIAGNOSIS — Z01.818 PREOPERATIVE TESTING: ICD-10-CM

## 2025-05-13 DIAGNOSIS — E66.01 MORBID OBESITY (HCC): ICD-10-CM

## 2025-05-13 DIAGNOSIS — F54 PSYCHOLOGICAL FACTORS AFFECTING MORBID OBESITY (HCC): Primary | ICD-10-CM

## 2025-05-13 DIAGNOSIS — E55.9 VITAMIN D DEFICIENCY: ICD-10-CM

## 2025-05-13 LAB — TSH REFLEX: 0.92 UIU/ML (ref 0.44–3.86)

## 2025-05-13 PROCEDURE — 90791 PSYCH DIAGNOSTIC EVALUATION: CPT | Performed by: PSYCHOLOGIST

## 2025-05-13 PROCEDURE — PBSHW PBB SHADOW CHARGE: Performed by: DIETITIAN, REGISTERED

## 2025-05-13 PROCEDURE — 3044F HG A1C LEVEL LT 7.0%: CPT | Performed by: DIETITIAN, REGISTERED

## 2025-05-13 PROCEDURE — 97803 MED NUTRITION INDIV SUBSEQ: CPT | Performed by: DIETITIAN, REGISTERED

## 2025-05-13 ASSESSMENT — ANXIETY QUESTIONNAIRES
6. BECOMING EASILY ANNOYED OR IRRITABLE: MORE THAN HALF THE DAYS
IF YOU CHECKED OFF ANY PROBLEMS ON THIS QUESTIONNAIRE, HOW DIFFICULT HAVE THESE PROBLEMS MADE IT FOR YOU TO DO YOUR WORK, TAKE CARE OF THINGS AT HOME, OR GET ALONG WITH OTHER PEOPLE: SOMEWHAT DIFFICULT
3. WORRYING TOO MUCH ABOUT DIFFERENT THINGS: NOT AT ALL
5. BEING SO RESTLESS THAT IT IS HARD TO SIT STILL: SEVERAL DAYS
7. FEELING AFRAID AS IF SOMETHING AWFUL MIGHT HAPPEN: NOT AT ALL
1. FEELING NERVOUS, ANXIOUS, OR ON EDGE: SEVERAL DAYS
GAD7 TOTAL SCORE: 5
4. TROUBLE RELAXING: SEVERAL DAYS
2. NOT BEING ABLE TO STOP OR CONTROL WORRYING: NOT AT ALL

## 2025-05-13 ASSESSMENT — PATIENT HEALTH QUESTIONNAIRE - PHQ9
8. MOVING OR SPEAKING SO SLOWLY THAT OTHER PEOPLE COULD HAVE NOTICED. OR THE OPPOSITE, BEING SO FIGETY OR RESTLESS THAT YOU HAVE BEEN MOVING AROUND A LOT MORE THAN USUAL: NOT AT ALL
SUM OF ALL RESPONSES TO PHQ QUESTIONS 1-9: 6
2. FEELING DOWN, DEPRESSED OR HOPELESS: SEVERAL DAYS
SUM OF ALL RESPONSES TO PHQ QUESTIONS 1-9: 6
SUM OF ALL RESPONSES TO PHQ QUESTIONS 1-9: 6
5. POOR APPETITE OR OVEREATING: NOT AT ALL
SUM OF ALL RESPONSES TO PHQ QUESTIONS 1-9: 6
3. TROUBLE FALLING OR STAYING ASLEEP: SEVERAL DAYS
7. TROUBLE CONCENTRATING ON THINGS, SUCH AS READING THE NEWSPAPER OR WATCHING TELEVISION: NOT AT ALL
4. FEELING TIRED OR HAVING LITTLE ENERGY: MORE THAN HALF THE DAYS
9. THOUGHTS THAT YOU WOULD BE BETTER OFF DEAD, OR OF HURTING YOURSELF: NOT AT ALL
1. LITTLE INTEREST OR PLEASURE IN DOING THINGS: SEVERAL DAYS
10. IF YOU CHECKED OFF ANY PROBLEMS, HOW DIFFICULT HAVE THESE PROBLEMS MADE IT FOR YOU TO DO YOUR WORK, TAKE CARE OF THINGS AT HOME, OR GET ALONG WITH OTHER PEOPLE: SOMEWHAT DIFFICULT
6. FEELING BAD ABOUT YOURSELF - OR THAT YOU ARE A FAILURE OR HAVE LET YOURSELF OR YOUR FAMILY DOWN: SEVERAL DAYS

## 2025-05-13 NOTE — PATIENT INSTRUCTIONS
group support model is recommended, if available.         2. Follow all discharge instructions closely and attend follow-up appointments: Regular follow-up appointments with the weight management office are important for monitoring your progress, addressing any concerns or challenges, and provide ongoing guidance and support. These appointments can also help you stay motivated and on track with your weight loss goals.        3. Please note that psychiatric medications may not work as well after bariatric surgery; specifically, the bioavailability of SSRI and some SNRI medications is reduced after gastric bypass, and many delayed/sustained/extended-release formulas result in problems with malabsorption following bariatric surgery, especially gastric bypass surgery. Carefully monitor your mood with your prescriber to determine any potential need for changes in dosage, types of medications prescribed, etc.      4. Regular exercise is important for maintaining weight loss and improving overall health. Start with low-impact exercises and gradually increase your intensity as your body adapts and heals. Aim for a well-rounded exercise routine that includes cardiovascular exercises (e.g., walking, swimming, cycling), strength training (using resistance bands or weights), and flexibility exercises (such as stretching or yoga). This combination helps promote weight loss, increase muscle strength, and improve flexibility.        5. Take vitamins and supplements as directed: Bariatric surgery can affect nutrient absorption, so it is important to take daily vitamins and supplements as recommended by your surgeon and the bariatric dietitian.        6. You may not smoke or use other nicotine products (including replacement therapy, such as patches or gum) after bariatric surgery. Not only is smoking the number one cause of preventable death in the [place], but it can delay healing. Smoking also increases your risk for blood

## 2025-05-13 NOTE — PROGRESS NOTES
post operative instructions; however, she believes the benefits will outweigh the potential risks, as she hopes to prevent, minimize, or reverse the effects of obesity-related comorbidity. Lakshmi was able to adequately describe the Gastric Bypass (Joelle-en-Y) procedure. She reports realistic expectations for the procedure, as her overall goals include \"getting healthier and being able to have a better and more enjoyable life.\" Lakshmi identified a personal goal weight of 135 lbs, or less -but noted her primary goal is to improve her quality of life and reduce risk of comorbidity. She understands the need for permanent lifestyle change, including dietary modifications and a regular exercise program, and expressed willingness to implement the necessary changes. She expressed a commitment to comply with treatment recommendations through this office. She identified her mother, , housemate, and several friends as a good support system in her efforts to meet her weight management goals.    Clinical Impression Summary:  Lakshmi is a 55 y.o. female referred for a pre-surgical psychological evaluation to identify psychosocial issues that may complicate outcomes and provide recommendations for how to improve these potential problems. Overall, the patient presented as cooperative and motivated to participate in the evaluation process. Based upon clinical interview, behavioral observations, medical records review, and testing data, Lakshmi's psychological suitability for bariatric surgery was predicted to be fair.     Lakshmi is a 55-year-old female with a history of outpatient mental health treatment, initially diagnosed with Major Depressive Disorder, with rule-outs of PTSD and Generalized Anxiety Disorder. She is currently participating in ongoing therapy through [facility]. Lakshmi has been prescribed bupropion (Wellbutrin XL), duloxetine (Cymbalta), and Topamax for pre-operative weight loss but reported a two-week gap in

## 2025-05-13 NOTE — PROGRESS NOTES
Nutrition follow up prior to surgery  Visit number:  7 out of 6 for Joelle en Y gastric bypass.    Initial Weight: 285 lbs    Wt Readings from Last 3 Encounters:   04/21/25 122 kg (269 lb)   01/24/25 119.3 kg (263 lb)   12/20/24 118.1 kg (260 lb 6.4 oz)     lost 4 lbs over 6 months, down 14 lbs from initial.    Previous nutrition goals:   Continue weight loss efforts  Prepare for 4 week pre op diet    Nutrition goals: patient returning to program with new insurance (previous insurance didn't cover bariatric surgery). Patient has continued to meet nutrition goals over the past 6 months. States now has food stamps and feels she is now better prepared for surgery.      Patient has completed required nutrition appointments and has met nutrition goals.  Reviewed importance of preoperative weight loss to promote a safer and easier surgery.  Patient is cleared from nutrition for bariatric surgery at this time.  Will follow up preoperatively as needed.      PES Statement:  Overweight/Obesity related to a complex combination of decreased energy needs, disordered eating patterns, physical inactivity, and increased psychological/life stress as evidenced by BMI greater than 30 and inability to maintain a significant amount of weight loss through conventional weight loss interventions.    Intervention:  Reviewed previous goals and set new goals.  Stressed importance of preoperative weight loss/maintenance or surgery may be postponed or cancelled.  Provided continued education regarding diet and lifestyle changes for optimal success post bariatric surgery.  Encouragement and support provided.    Education materials provided:   Updated diet progression    Plan/Recommendations:   Continue weight loss efforts  Prepare for 4 week pre op diet    Follow up: 1 week post op    Total time involved in direct patient education: 30 minutes    Candice Rodriguez RD

## 2025-05-14 ENCOUNTER — OFFICE VISIT (OUTPATIENT)
Age: 55
End: 2025-05-14
Payer: MEDICAID

## 2025-05-14 ENCOUNTER — RESULTS FOLLOW-UP (OUTPATIENT)
Dept: FAMILY MEDICINE CLINIC | Age: 55
End: 2025-05-14

## 2025-05-14 VITALS
WEIGHT: 270.4 LBS | BODY MASS INDEX: 53.09 KG/M2 | HEIGHT: 60 IN | DIASTOLIC BLOOD PRESSURE: 70 MMHG | HEART RATE: 86 BPM | SYSTOLIC BLOOD PRESSURE: 118 MMHG | OXYGEN SATURATION: 96 %

## 2025-05-14 VITALS — BODY MASS INDEX: 53.79 KG/M2 | WEIGHT: 274 LBS | HEIGHT: 60 IN | TEMPERATURE: 97.1 F

## 2025-05-14 DIAGNOSIS — E11.69 TYPE 2 DIABETES MELLITUS WITH OBESITY (HCC): ICD-10-CM

## 2025-05-14 DIAGNOSIS — G89.29 CHRONIC PAIN OF BOTH KNEES: ICD-10-CM

## 2025-05-14 DIAGNOSIS — M47.816 SPONDYLOSIS WITHOUT MYELOPATHY OR RADICULOPATHY, LUMBAR REGION: ICD-10-CM

## 2025-05-14 DIAGNOSIS — M25.562 CHRONIC PAIN OF BOTH KNEES: ICD-10-CM

## 2025-05-14 DIAGNOSIS — E66.9 TYPE 2 DIABETES MELLITUS WITH OBESITY (HCC): ICD-10-CM

## 2025-05-14 DIAGNOSIS — M48.062 SPINAL STENOSIS OF LUMBAR REGION WITH NEUROGENIC CLAUDICATION: ICD-10-CM

## 2025-05-14 DIAGNOSIS — E66.01 MORBID OBESITY DUE TO EXCESS CALORIES (HCC): Primary | ICD-10-CM

## 2025-05-14 DIAGNOSIS — M25.561 CHRONIC PAIN OF BOTH KNEES: ICD-10-CM

## 2025-05-14 DIAGNOSIS — M47.816 LUMBAR SPONDYLOSIS: Primary | ICD-10-CM

## 2025-05-14 LAB — VITAMIN D 25-HYDROXY: 31.7 NG/ML (ref 30–100)

## 2025-05-14 PROCEDURE — 3074F SYST BP LT 130 MM HG: CPT | Performed by: SURGERY

## 2025-05-14 PROCEDURE — 3044F HG A1C LEVEL LT 7.0%: CPT | Performed by: SURGERY

## 2025-05-14 PROCEDURE — 3078F DIAST BP <80 MM HG: CPT | Performed by: SURGERY

## 2025-05-14 PROCEDURE — 99215 OFFICE O/P EST HI 40 MIN: CPT | Performed by: SURGERY

## 2025-05-14 PROCEDURE — 99214 OFFICE O/P EST MOD 30 MIN: CPT | Performed by: STUDENT IN AN ORGANIZED HEALTH CARE EDUCATION/TRAINING PROGRAM

## 2025-05-14 PROCEDURE — G2211 COMPLEX E/M VISIT ADD ON: HCPCS | Performed by: STUDENT IN AN ORGANIZED HEALTH CARE EDUCATION/TRAINING PROGRAM

## 2025-05-14 PROCEDURE — 99214 OFFICE O/P EST MOD 30 MIN: CPT | Performed by: SURGERY

## 2025-05-14 RX ORDER — HYDROXYZINE HYDROCHLORIDE 25 MG/1
TABLET, FILM COATED ORAL
COMMUNITY
Start: 2025-05-14

## 2025-05-14 RX ORDER — ATORVASTATIN CALCIUM 10 MG/1
10 TABLET, FILM COATED ORAL DAILY
Qty: 60 TABLET | Refills: 1 | Status: SHIPPED | OUTPATIENT
Start: 2025-05-14

## 2025-05-14 RX ORDER — TRAMADOL HYDROCHLORIDE 50 MG/1
TABLET ORAL
Qty: 2 TABLET | Refills: 0 | OUTPATIENT
Start: 2025-05-14

## 2025-05-14 RX ORDER — BACLOFEN 5 MG/1
5 TABLET ORAL 3 TIMES DAILY PRN
Qty: 90 TABLET | Refills: 1 | Status: SHIPPED | OUTPATIENT
Start: 2025-05-14

## 2025-05-14 RX ORDER — GABAPENTIN 600 MG/1
600 TABLET ORAL 3 TIMES DAILY
Qty: 90 TABLET | Refills: 3 | Status: SHIPPED | OUTPATIENT
Start: 2025-05-14 | End: 2025-09-11

## 2025-05-14 RX ORDER — GABAPENTIN 300 MG/1
300 CAPSULE ORAL 3 TIMES DAILY
Qty: 270 CAPSULE | Refills: 1 | Status: SHIPPED | OUTPATIENT
Start: 2025-05-14 | End: 2025-05-14

## 2025-05-14 RX ORDER — TOPIRAMATE 50 MG/1
50 TABLET, FILM COATED ORAL 2 TIMES DAILY
Qty: 60 TABLET | Refills: 3 | Status: SHIPPED | OUTPATIENT
Start: 2025-05-14

## 2025-05-14 RX ORDER — BUPROPION HYDROCHLORIDE 300 MG/1
300 TABLET ORAL EVERY MORNING
Qty: 60 TABLET | Refills: 3 | Status: SHIPPED | OUTPATIENT
Start: 2025-05-14

## 2025-05-14 ASSESSMENT — ENCOUNTER SYMPTOMS: BACK PAIN: 1

## 2025-05-14 NOTE — TELEPHONE ENCOUNTER
Requested Renewals     Name from pharmacy: traMADol HCl 50 MG Oral Tablet         Will file in chart as: traMADol (ULTRAM) 50 MG tablet    Sig: TAKE 1 TABLET BY MOUTH ONCE DAILY AS NEEDED FOR PAIN FOR UP TO 30 DAYS. MAX DAILY DOSE 50MG (1 TABLET).     important  Maximum MME cannot be calculated for this prescription. Enter discrete sig details to calculate maximum MME.       Disp: 2 tablet    Refills: 0    Start: 5/14/2025    Class: Normal    PDMP Review May Be Needed    For: Spondylosis without myelopathy or radiculopathy, lumbar region    To pharmacy: Reduce doses taken as pain becomes manageable    Last ordered: 6 months ago (10/24/2024) by Historical Provider, MD    Last refill: 1/22/2025    Rx #: [id number]    Opioid Analgesics Protocol Rjemkr1805/14/2025 11:32 AM   Protocol Details Visit with relevant provider in past 3 months or upcoming 3 months    Opioid pain agreement on file in past year    No positive pregnancy test or active pregnancy on record in past 12 months    Medication not refilled in past 28 days    No Benzodiazepines on active med list    Drug screen / Toxicology results on file in past 12 months      To be filled at: [address] [address] [address] - P [phone] - F [phone]        Requested Prescriptions     Pending Prescriptions Disp Refills    traMADol (ULTRAM) 50 MG tablet [Pharmacy Med Name: traMADol HCl 50 MG Oral Tablet] 2 tablet 0     Sig: TAKE 1 TABLET BY MOUTH ONCE DAILY AS NEEDED FOR PAIN FOR UP TO 30 DAYS. MAX DAILY DOSE 50MG (1 TABLET).       Patient last seen on:  12/03/2024    Date of last refill:  01/22/2025    Next office visit date: 5/14/2025    Tape [adhesive tape]

## 2025-05-14 NOTE — PROGRESS NOTES
HPI  Location: Back  Patient states that her pain is at a 5 at rest and a 10 with activity on the pain scale.   Patient states that she received 95% of pain relief after Bilateral L4,LR, S1 RFA on 12/03/2024.    
Thoracic back: Normal.      Lumbar back: Tenderness present. Decreased range of motion. Negative right straight leg raise test and negative left straight leg raise test.        Back:       Right knee: Bony tenderness present. Tenderness present.      Left knee: Bony tenderness present. Tenderness present.        Legs:       Comments: Negative Straight Leg Raise Test bilaterally  Positive Facet Loading/Potter's Test bilaterally  Positive SI Joint Tenderness bilaterally  Negative Lanre's/MANDI Test bilaterally  Negative Gaenslen's Test bilaterally  Negative Sacroiliac Compression Test bilaterally  Negative FADIR Test bilaterally     Neurological:      General: No focal deficit present.      Mental Status: She is alert and oriented to person, place, and time.      Cranial Nerves: Cranial nerves 2-12 are intact.      Sensory: Sensation is intact.      Motor: Motor function is intact.      Coordination: Coordination is intact.      Gait: Gait is intact.   Psychiatric:         Mood and Affect: Mood normal.         Behavior: Behavior normal.         Thought Content: Thought content normal.         Judgment: Judgment normal.         Allergies   Allergen Reactions    Tape [Adhesive Tape] Other (See Comments)     Plastic tape gives her Skin blisters       Current Outpatient Medications:     topiramate (TOPAMAX) 50 MG tablet, Take 1 tablet by mouth 2 times daily, Disp: 60 tablet, Rfl: 3    empagliflozin (JARDIANCE) 10 MG tablet, Take 1 tablet by mouth daily, Disp: 90 tablet, Rfl: 1    buPROPion (WELLBUTRIN XL) 300 MG extended release tablet, Take 1 tablet by mouth every morning, Disp: 60 tablet, Rfl: 3    atorvastatin (LIPITOR) 10 MG tablet, Take 1 tablet by mouth daily, Disp: 60 tablet, Rfl: 1    gabapentin (NEURONTIN) 600 MG tablet, Take 1 tablet by mouth 3 times daily for 120 days., Disp: 90 tablet, Rfl: 3    Baclofen (LIORESAL) 5 MG tablet, Take 1 tablet by mouth 3 times daily as needed (muscle aches), Disp: 90 tablet,

## 2025-05-14 NOTE — PROGRESS NOTES
metabolic rate. We discussed the need for good sleep and the treatment of sleep apnea if present.  We discussed, again, the mechanisms and risks of topiramate.  Lakshmi Rebolledo would like to resume this medication and will follow up in 1 months.    We discussed the physiologic effects of exercise on basal metabolic rate, and I encouraged a routine of different exercise modes to improve weight loss and weight management.  More than 40 minutes were spent in face-to-face interaction with patient majority of which were spent in care coordination of the above.        Mitesh Ahn MD, FASMBS, FACS, MBSAQIP Verified Surgeon  [id number]-USN-RET, Diplomate of The American Board of Surgery

## 2025-05-15 LAB
AMPHET CTO UR CFM-MCNC: NEGATIVE NG/ML
BARBITURATES CTO UR CFM-MCNC: NEGATIVE NG/ML
BENZODIAZ CTO UR CFM-MCNC: NEGATIVE NG/ML
CANNABINOIDS CTO UR CFM-MCNC: NEGATIVE NG/ML
COCAINE CTO UR CFM-MCNC: NEGATIVE NG/ML
CREAT UR-MCNC: 109 MG/DL (ref 20–400)
DRUGS OF ABUSE COMMENT: NORMAL
METHADONE CTO UR CFM-MCNC: NEGATIVE NG/ML
OPIATES CTO UR CFM-MCNC: NEGATIVE NG/ML
PCP CTO UR CFM-MCNC: NEGATIVE NG/ML
PROPOXYPH CTO UR CFM-MCNC: NEGATIVE NG/ML

## 2025-05-18 LAB
ANABASINE UR-MCNC: <5 NG/ML
COTININE UR-MCNC: 48 NG/ML
NICOTINE UR-MCNC: 79 NG/ML
TRANS-3-OH-COTININE UR-MCNC: 124 NG/ML

## 2025-05-19 DIAGNOSIS — E11.65 TYPE 2 DIABETES MELLITUS WITH HYPERGLYCEMIA, WITHOUT LONG-TERM CURRENT USE OF INSULIN (HCC): ICD-10-CM

## 2025-05-19 DIAGNOSIS — E66.813 CLASS 3 SEVERE OBESITY DUE TO EXCESS CALORIES WITH SERIOUS COMORBIDITY AND BODY MASS INDEX (BMI) OF 50.0 TO 59.9 IN ADULT (HCC): ICD-10-CM

## 2025-05-19 DIAGNOSIS — G47.33 OSA (OBSTRUCTIVE SLEEP APNEA): ICD-10-CM

## 2025-05-19 DIAGNOSIS — I10 PRIMARY HYPERTENSION: ICD-10-CM

## 2025-05-19 DIAGNOSIS — F43.10 PTSD (POST-TRAUMATIC STRESS DISORDER): ICD-10-CM

## 2025-05-19 DIAGNOSIS — Z87.891 HISTORY OF NICOTINE USE: ICD-10-CM

## 2025-05-19 DIAGNOSIS — F54 PSYCHOLOGICAL FACTORS AFFECTING MORBID OBESITY (HCC): Primary | ICD-10-CM

## 2025-05-19 DIAGNOSIS — E66.01 PSYCHOLOGICAL FACTORS AFFECTING MORBID OBESITY (HCC): Primary | ICD-10-CM

## 2025-05-19 DIAGNOSIS — F39 UNSPECIFIED MOOD (AFFECTIVE) DISORDER: ICD-10-CM

## 2025-05-19 DIAGNOSIS — K20.90 ESOPHAGITIS DETERMINED BY ENDOSCOPY: ICD-10-CM

## 2025-05-19 PROBLEM — E11.29 TYPE 2 DIABETES MELLITUS WITH MICROALBUMINURIA, WITHOUT LONG-TERM CURRENT USE OF INSULIN (HCC): Chronic | Status: ACTIVE | Noted: 2024-11-26

## 2025-05-19 PROBLEM — R80.9 TYPE 2 DIABETES MELLITUS WITH MICROALBUMINURIA, WITHOUT LONG-TERM CURRENT USE OF INSULIN (HCC): Chronic | Status: ACTIVE | Noted: 2024-11-26

## 2025-05-28 ENCOUNTER — HOSPITAL ENCOUNTER (OUTPATIENT)
Dept: LAB | Age: 55
Discharge: HOME | End: 2025-05-28
Payer: MEDICAID

## 2025-05-28 DIAGNOSIS — E11.65 TYPE 2 DIABETES MELLITUS WITH HYPERGLYCEMIA, WITHOUT LONG-TERM CURRENT USE OF INSULIN (HCC): ICD-10-CM

## 2025-05-28 DIAGNOSIS — F43.10 PTSD (POST-TRAUMATIC STRESS DISORDER): ICD-10-CM

## 2025-05-28 DIAGNOSIS — F39 UNSPECIFIED MOOD (AFFECTIVE) DISORDER: ICD-10-CM

## 2025-05-28 DIAGNOSIS — G47.33 OSA (OBSTRUCTIVE SLEEP APNEA): ICD-10-CM

## 2025-05-28 DIAGNOSIS — F54 PSYCHOLOGICAL FACTORS AFFECTING MORBID OBESITY (HCC): ICD-10-CM

## 2025-05-28 DIAGNOSIS — E66.01 PSYCHOLOGICAL FACTORS AFFECTING MORBID OBESITY (HCC): ICD-10-CM

## 2025-05-28 DIAGNOSIS — Z87.891 HISTORY OF NICOTINE USE: ICD-10-CM

## 2025-05-28 DIAGNOSIS — E66.813 CLASS 3 SEVERE OBESITY DUE TO EXCESS CALORIES WITH SERIOUS COMORBIDITY AND BODY MASS INDEX (BMI) OF 50.0 TO 59.9 IN ADULT (HCC): ICD-10-CM

## 2025-05-28 DIAGNOSIS — K20.90 ESOPHAGITIS DETERMINED BY ENDOSCOPY: ICD-10-CM

## 2025-05-28 DIAGNOSIS — I10 PRIMARY HYPERTENSION: ICD-10-CM

## 2025-05-28 PROCEDURE — G0480 DRUG TEST DEF 1-7 CLASSES: HCPCS

## 2025-05-28 PROCEDURE — 36415 COLL VENOUS BLD VENIPUNCTURE: CPT

## 2025-06-02 LAB
COTININE SERPL-MCNC: 8 NG/ML
NICOTINE SERPL-MCNC: <5 NG/ML

## 2025-06-03 DIAGNOSIS — Z01.818 PREOPERATIVE TESTING: Primary | ICD-10-CM

## 2025-06-03 DIAGNOSIS — E11.69 TYPE 2 DIABETES MELLITUS WITH OBESITY (HCC): ICD-10-CM

## 2025-06-03 DIAGNOSIS — E66.01 MORBID OBESITY DUE TO EXCESS CALORIES (HCC): ICD-10-CM

## 2025-06-03 DIAGNOSIS — E66.9 TYPE 2 DIABETES MELLITUS WITH OBESITY (HCC): ICD-10-CM

## 2025-06-03 DIAGNOSIS — I10 PRIMARY HYPERTENSION: ICD-10-CM

## 2025-06-09 ENCOUNTER — TRANSCRIBE ORDERS (OUTPATIENT)
Dept: ADMINISTRATIVE | Age: 55
End: 2025-06-09

## 2025-06-09 DIAGNOSIS — Z12.31 ENCOUNTER FOR SCREENING MAMMOGRAM FOR MALIGNANT NEOPLASM OF BREAST: Primary | ICD-10-CM

## 2025-06-12 ENCOUNTER — HOSPITAL ENCOUNTER (OUTPATIENT)
Dept: LAB | Age: 55
Discharge: HOME | End: 2025-06-12
Payer: COMMERCIAL

## 2025-06-12 DIAGNOSIS — E66.9 TYPE 2 DIABETES MELLITUS WITH OBESITY (HCC): ICD-10-CM

## 2025-06-12 DIAGNOSIS — I10 PRIMARY HYPERTENSION: ICD-10-CM

## 2025-06-12 DIAGNOSIS — E11.69 TYPE 2 DIABETES MELLITUS WITH OBESITY (HCC): ICD-10-CM

## 2025-06-12 DIAGNOSIS — Z01.818 PREOPERATIVE TESTING: ICD-10-CM

## 2025-06-12 DIAGNOSIS — E66.01 MORBID OBESITY DUE TO EXCESS CALORIES (HCC): ICD-10-CM

## 2025-06-12 LAB
CHOLEST SERPL-MCNC: 182 MG/DL (ref 0–199)
HDLC SERPL-MCNC: 45 MG/DL (ref 40–59)
LDL CHOLESTEROL: 118 MG/DL (ref 0–129)
TRIGLYCERIDE, FASTING: 94 MG/DL (ref 0–150)

## 2025-06-12 PROCEDURE — 83540 ASSAY OF IRON: CPT

## 2025-06-12 PROCEDURE — 82746 ASSAY OF FOLIC ACID SERUM: CPT

## 2025-06-12 PROCEDURE — 80061 LIPID PANEL: CPT

## 2025-06-12 PROCEDURE — 82607 VITAMIN B-12: CPT

## 2025-06-12 PROCEDURE — 36415 COLL VENOUS BLD VENIPUNCTURE: CPT

## 2025-06-12 PROCEDURE — 82330 ASSAY OF CALCIUM: CPT

## 2025-06-12 PROCEDURE — 84425 ASSAY OF VITAMIN B-1: CPT

## 2025-06-12 PROCEDURE — 83550 IRON BINDING TEST: CPT

## 2025-06-13 LAB
FOLATE: 16.5 NG/ML (ref 4.8–24.2)
IRON % SATURATION: 53 % (ref 20–55)
IRON: 122 UG/DL (ref 37–145)
TOTAL IRON BINDING CAPACITY: 232 UG/DL (ref 250–450)
UNSATURATED IRON BINDING CAPACITY: 110 UG/DL (ref 112–347)
VITAMIN B-12: 771 PG/ML (ref 232–1245)

## 2025-06-14 LAB
CA-I ADJ PH7.4 SERPL-SCNC: 1.3 MMOL/L (ref 1.09–1.3)
CA-I SERPL ISE-SCNC: 1.31 MMOL/L (ref 1.09–1.3)

## 2025-06-16 LAB — VIT B1 BLD-MCNC: 236 NMOL/L (ref 70–180)

## 2025-06-23 ENCOUNTER — OFFICE VISIT (OUTPATIENT)
Age: 55
End: 2025-06-23
Payer: COMMERCIAL

## 2025-06-23 VITALS
SYSTOLIC BLOOD PRESSURE: 110 MMHG | OXYGEN SATURATION: 98 % | BODY MASS INDEX: 52.97 KG/M2 | WEIGHT: 269.8 LBS | HEART RATE: 85 BPM | HEIGHT: 60 IN | DIASTOLIC BLOOD PRESSURE: 70 MMHG

## 2025-06-23 DIAGNOSIS — E66.01 MORBID OBESITY (HCC): Primary | ICD-10-CM

## 2025-06-23 LAB
ALBUMIN SERPL-MCNC: 4.1 G/DL (ref 3.5–4.6)
ANION GAP SERPL CALCULATED.3IONS-SCNC: 10 MEQ/L (ref 9–15)
BUN SERPL-MCNC: 14 MG/DL (ref 6–20)
CALCIUM SERPL-MCNC: 9.4 MG/DL (ref 8.5–9.9)
CHLORIDE SERPL-SCNC: 107 MEQ/L (ref 95–107)
CO2 SERPL-SCNC: 26 MEQ/L (ref 20–31)
CREAT SERPL-MCNC: 1.03 MG/DL (ref 0.5–0.9)
ERYTHROCYTE [DISTWIDTH] IN BLOOD BY AUTOMATED COUNT: 13.4 % (ref 11.5–14.5)
GLUCOSE SERPL-MCNC: 99 MG/DL (ref 70–99)
HCT VFR BLD AUTO: 48.9 % (ref 37–47)
HGB BLD-MCNC: 15.9 G/DL (ref 12–16)
MCH RBC QN AUTO: 31.8 PG (ref 27–31.3)
MCHC RBC AUTO-ENTMCNC: 32.5 % (ref 33–37)
MCV RBC AUTO: 97.8 FL (ref 79.4–94.8)
PLATELET # BLD AUTO: 294 K/UL (ref 130–400)
POTASSIUM SERPL-SCNC: 3.9 MEQ/L (ref 3.4–4.9)
RBC # BLD AUTO: 5 M/UL (ref 4.2–5.4)
SODIUM SERPL-SCNC: 143 MEQ/L (ref 135–144)
WBC # BLD AUTO: 14.8 K/UL (ref 4.8–10.8)

## 2025-06-23 PROCEDURE — 3078F DIAST BP <80 MM HG: CPT | Performed by: SURGERY

## 2025-06-23 PROCEDURE — 99214 OFFICE O/P EST MOD 30 MIN: CPT | Performed by: SURGERY

## 2025-06-23 PROCEDURE — 99215 OFFICE O/P EST HI 40 MIN: CPT | Performed by: SURGERY

## 2025-06-23 PROCEDURE — 3017F COLORECTAL CA SCREEN DOC REV: CPT | Performed by: SURGERY

## 2025-06-23 PROCEDURE — G8427 DOCREV CUR MEDS BY ELIG CLIN: HCPCS | Performed by: SURGERY

## 2025-06-23 PROCEDURE — G8417 CALC BMI ABV UP PARAM F/U: HCPCS | Performed by: SURGERY

## 2025-06-23 PROCEDURE — 3074F SYST BP LT 130 MM HG: CPT | Performed by: SURGERY

## 2025-06-23 PROCEDURE — 1036F TOBACCO NON-USER: CPT | Performed by: SURGERY

## 2025-06-23 RX ORDER — PHENTERMINE HYDROCHLORIDE 37.5 MG/1
37.5 TABLET ORAL
Qty: 30 TABLET | Refills: 0 | Status: SHIPPED | OUTPATIENT
Start: 2025-06-23 | End: 2025-07-23

## 2025-06-23 RX ORDER — ACETAMINOPHEN 325 MG/1
1000 TABLET ORAL ONCE
OUTPATIENT
Start: 2025-06-23 | End: 2025-06-23

## 2025-06-23 RX ORDER — POLYETHYLENE GLYCOL 3350, SODIUM SULFATE ANHYDROUS, SODIUM BICARBONATE, SODIUM CHLORIDE, POTASSIUM CHLORIDE 236; 22.74; 6.74; 5.86; 2.97 G/4L; G/4L; G/4L; G/4L; G/4L
4 POWDER, FOR SOLUTION ORAL ONCE
Qty: 4000 ML | Refills: 0 | Status: SHIPPED | OUTPATIENT
Start: 2025-06-23 | End: 2025-06-23

## 2025-06-23 RX ORDER — HEPARIN SODIUM 5000 [USP'U]/ML
2500 INJECTION, SOLUTION INTRAVENOUS; SUBCUTANEOUS ONCE
OUTPATIENT
Start: 2025-06-23 | End: 2025-06-23

## 2025-06-23 RX ORDER — SODIUM CHLORIDE 0.9 % (FLUSH) 0.9 %
5-40 SYRINGE (ML) INJECTION EVERY 12 HOURS SCHEDULED
OUTPATIENT
Start: 2025-06-23

## 2025-06-23 RX ORDER — SODIUM CHLORIDE 0.9 % (FLUSH) 0.9 %
5-40 SYRINGE (ML) INJECTION PRN
OUTPATIENT
Start: 2025-06-23

## 2025-06-23 RX ORDER — ONDANSETRON 2 MG/ML
4 INJECTION INTRAMUSCULAR; INTRAVENOUS ONCE
OUTPATIENT
Start: 2025-06-23 | End: 2025-06-23

## 2025-06-23 RX ORDER — APREPITANT 40 MG/1
120 CAPSULE ORAL ONCE
Qty: 3 CAPSULE | Refills: 0 | Status: SHIPPED | OUTPATIENT
Start: 2025-06-23 | End: 2025-06-23

## 2025-06-23 RX ORDER — SODIUM CHLORIDE 9 MG/ML
INJECTION, SOLUTION INTRAVENOUS PRN
OUTPATIENT
Start: 2025-06-23

## 2025-06-23 RX ORDER — SODIUM CHLORIDE, SODIUM LACTATE, POTASSIUM CHLORIDE, CALCIUM CHLORIDE 600; 310; 30; 20 MG/100ML; MG/100ML; MG/100ML; MG/100ML
INJECTION, SOLUTION INTRAVENOUS CONTINUOUS
OUTPATIENT
Start: 2025-06-23

## 2025-06-23 NOTE — PROGRESS NOTES
Pre-op History and Physical    Patient Name:  :  Hospital Day:   Lakshmi Rebolledo 1970 [unfilled]     Date of Service: 2025    Subjective:      History of Present Illness:   Lakshmi Rebolledo  is a 54 y.o. female referred by Dr. Flores for type II diabetes and morbid obesity.  Lakshmi Rebolledo reports multiple episodes of weight loss and regain after multiple diet and exercise programs. She quit smoking 12 months ago.  No alcohol.  She recently had a sleep study done.      The patient has completed the pre-op process for Bariatric surgery and is currently on the pre-op diet. Per our Psychologist, Dr. Dominguez, there appears to be no active psychiatric contraindications to patient receiving bariatric surgery at this time; there is no evidence of untreated/undertreated clinically significant general psychopathology or substance/alcohol use disorders.    Past Medical History:   Diagnosis Date    Abnormal Pap smear of cervix     Cerebral artery occlusion with cerebral infarction ([facility])     CHF (congestive heart failure) ([facility]) ?    Chlamydia     Closed wedge compression fracture of L3 vertebra with routine healing 2024    COPD (chronic obstructive pulmonary disease) ([facility])     COPD (chronic obstructive pulmonary disease) ([facility]) 2022    Depression     Drug effect     plastic  tape    Esophagitis determined by endoscopy 07/10/2024    On EGD 07/10/2024 - noted at GE junction    Heart disease     History of abnormal cervical Pap smear 2024    History of blood transfusion     post delivery 32 yrs ago    History of colon polyps 07/10/2024    History of tobacco use 2024    Hyperlipidemia     Hypertension 1990 cont    Lumbar spondylosis 2024    Mass of multiple sites of right breast 2024    Menopausal symptoms 2022    MI (myocardial infarction) ([facility])     Microalbuminuria due to type 2 diabetes mellitus ([facility]) 2024    Migraine     Morbid obesity

## 2025-06-23 NOTE — H&P (VIEW-ONLY)
Pre-op History and Physical    Patient Name:  :  Hospital Day:   Lakshmi Rebolledo 1970 [unfilled]     Date of Service: 2025    Subjective:      History of Present Illness:   Lakshmi Rebolledo  is a 54 y.o. female referred by Dr. Flores for type II diabetes and morbid obesity.  Lakshmi Rebolledo reports multiple episodes of weight loss and regain after multiple diet and exercise programs. She quit smoking 12 months ago.  No alcohol.  She recently had a sleep study done.      The patient has completed the pre-op process for Bariatric surgery and is currently on the pre-op diet. Per our Psychologist, Dr. Dominguez, there appears to be no active psychiatric contraindications to patient receiving bariatric surgery at this time; there is no evidence of untreated/undertreated clinically significant general psychopathology or substance/alcohol use disorders.    Past Medical History:   Diagnosis Date    Abnormal Pap smear of cervix     Cerebral artery occlusion with cerebral infarction ([facility])     CHF (congestive heart failure) ([facility]) ?    Chlamydia     Closed wedge compression fracture of L3 vertebra with routine healing 2024    COPD (chronic obstructive pulmonary disease) ([facility])     COPD (chronic obstructive pulmonary disease) ([facility]) 2022    Depression     Drug effect     plastic  tape    Esophagitis determined by endoscopy 07/10/2024    On EGD 07/10/2024 - noted at GE junction    Heart disease     History of abnormal cervical Pap smear 2024    History of blood transfusion     post delivery 32 yrs ago    History of colon polyps 07/10/2024    History of tobacco use 2024    Hyperlipidemia     Hypertension 1990 cont    Lumbar spondylosis 2024    Mass of multiple sites of right breast 2024    Menopausal symptoms 2022    MI (myocardial infarction) ([facility])     Microalbuminuria due to type 2 diabetes mellitus ([facility]) 2024    Migraine     Morbid obesity

## 2025-06-23 NOTE — PATIENT INSTRUCTIONS
We discussed that phentermine may have unintended side effects. Its side effects can vary from person to person. The most common are dry mouth and trouble sleeping. Serious side effects, such as heart valve disease and high blood pressure in the lung have occurred.    Take 37.5 mg phentermine per day pre-op (not day of surgery).    No aspirin, ibuprofen or other non-steroidal anti-inflammatory drugs (NSAIDs) 7 days prior to surgery.    No food or drink six hours prior to surgery.    Take your Emend (aprepitant)  the morning of surgery    Keep carbohydrates to 20 grams or less a day pre-op.    Do not inject Trulicty, Ozempic, or Wegovy for 2 weeks before surgery.    The more weight you lose before surgery, the easier and safer your operation will be, and the easier your recovery will be.    Complete the bowel prep the day before surgery.

## 2025-06-24 LAB
ESTIMATED AVERAGE GLUCOSE: 131 MG/DL
HBA1C MFR BLD: 6.2 % (ref 4–6)

## 2025-07-03 ENCOUNTER — HOSPITAL ENCOUNTER (OUTPATIENT)
Dept: MRI IMAGING | Age: 55
Discharge: HOME | End: 2025-07-05
Payer: COMMERCIAL

## 2025-07-03 DIAGNOSIS — M48.062 SPINAL STENOSIS OF LUMBAR REGION WITH NEUROGENIC CLAUDICATION: ICD-10-CM

## 2025-07-03 PROCEDURE — 72148 MRI LUMBAR SPINE W/O DYE: CPT

## 2025-07-07 ENCOUNTER — OFFICE VISIT (OUTPATIENT)
Age: 55
End: 2025-07-07
Payer: COMMERCIAL

## 2025-07-07 VITALS
HEART RATE: 96 BPM | OXYGEN SATURATION: 95 % | BODY MASS INDEX: 50.78 KG/M2 | WEIGHT: 260 LBS | SYSTOLIC BLOOD PRESSURE: 110 MMHG | DIASTOLIC BLOOD PRESSURE: 72 MMHG | RESPIRATION RATE: 16 BRPM

## 2025-07-07 VITALS — BODY MASS INDEX: 52.81 KG/M2 | HEIGHT: 60 IN | HEART RATE: 80 BPM | WEIGHT: 269 LBS | TEMPERATURE: 97.2 F

## 2025-07-07 DIAGNOSIS — G89.29 CHRONIC PAIN OF BOTH KNEES: ICD-10-CM

## 2025-07-07 DIAGNOSIS — M47.816 LUMBAR SPONDYLOSIS: ICD-10-CM

## 2025-07-07 DIAGNOSIS — M25.561 CHRONIC PAIN OF BOTH KNEES: ICD-10-CM

## 2025-07-07 DIAGNOSIS — M48.062 SPINAL STENOSIS OF LUMBAR REGION WITH NEUROGENIC CLAUDICATION: Primary | ICD-10-CM

## 2025-07-07 DIAGNOSIS — M25.562 CHRONIC PAIN OF BOTH KNEES: ICD-10-CM

## 2025-07-07 DIAGNOSIS — I10 HYPERTENSION, UNSPECIFIED TYPE: ICD-10-CM

## 2025-07-07 DIAGNOSIS — E78.5 DYSLIPIDEMIA: ICD-10-CM

## 2025-07-07 DIAGNOSIS — E11.65 TYPE 2 DIABETES MELLITUS WITH HYPERGLYCEMIA, WITHOUT LONG-TERM CURRENT USE OF INSULIN (HCC): Primary | ICD-10-CM

## 2025-07-07 PROCEDURE — 3017F COLORECTAL CA SCREEN DOC REV: CPT | Performed by: STUDENT IN AN ORGANIZED HEALTH CARE EDUCATION/TRAINING PROGRAM

## 2025-07-07 PROCEDURE — 1036F TOBACCO NON-USER: CPT | Performed by: INTERNAL MEDICINE

## 2025-07-07 PROCEDURE — G8427 DOCREV CUR MEDS BY ELIG CLIN: HCPCS | Performed by: STUDENT IN AN ORGANIZED HEALTH CARE EDUCATION/TRAINING PROGRAM

## 2025-07-07 PROCEDURE — G8427 DOCREV CUR MEDS BY ELIG CLIN: HCPCS | Performed by: INTERNAL MEDICINE

## 2025-07-07 PROCEDURE — 99214 OFFICE O/P EST MOD 30 MIN: CPT | Performed by: STUDENT IN AN ORGANIZED HEALTH CARE EDUCATION/TRAINING PROGRAM

## 2025-07-07 PROCEDURE — 3078F DIAST BP <80 MM HG: CPT | Performed by: INTERNAL MEDICINE

## 2025-07-07 PROCEDURE — 99213 OFFICE O/P EST LOW 20 MIN: CPT | Performed by: INTERNAL MEDICINE

## 2025-07-07 PROCEDURE — 3074F SYST BP LT 130 MM HG: CPT | Performed by: INTERNAL MEDICINE

## 2025-07-07 PROCEDURE — G8417 CALC BMI ABV UP PARAM F/U: HCPCS | Performed by: INTERNAL MEDICINE

## 2025-07-07 PROCEDURE — 3044F HG A1C LEVEL LT 7.0%: CPT | Performed by: INTERNAL MEDICINE

## 2025-07-07 PROCEDURE — 1036F TOBACCO NON-USER: CPT | Performed by: STUDENT IN AN ORGANIZED HEALTH CARE EDUCATION/TRAINING PROGRAM

## 2025-07-07 PROCEDURE — G8417 CALC BMI ABV UP PARAM F/U: HCPCS | Performed by: STUDENT IN AN ORGANIZED HEALTH CARE EDUCATION/TRAINING PROGRAM

## 2025-07-07 PROCEDURE — 99214 OFFICE O/P EST MOD 30 MIN: CPT | Performed by: INTERNAL MEDICINE

## 2025-07-07 PROCEDURE — 3017F COLORECTAL CA SCREEN DOC REV: CPT | Performed by: INTERNAL MEDICINE

## 2025-07-07 PROCEDURE — 2022F DILAT RTA XM EVC RTNOPTHY: CPT | Performed by: INTERNAL MEDICINE

## 2025-07-07 RX ORDER — APREPITANT 40 MG/1
CAPSULE ORAL
COMMUNITY
Start: 2025-06-23

## 2025-07-07 RX ORDER — BACLOFEN 5 MG/1
5 TABLET ORAL 3 TIMES DAILY PRN
Qty: 90 TABLET | Refills: 3 | Status: SHIPPED | OUTPATIENT
Start: 2025-07-07

## 2025-07-07 RX ORDER — GABAPENTIN 600 MG/1
600 TABLET ORAL 3 TIMES DAILY
Qty: 90 TABLET | Refills: 3 | Status: SHIPPED | OUTPATIENT
Start: 2025-07-07 | End: 2025-11-04

## 2025-07-07 ASSESSMENT — ENCOUNTER SYMPTOMS
GASTROINTESTINAL NEGATIVE: 1
NAUSEA: 0
WHEEZING: 0
CHEST TIGHTNESS: 1
STRIDOR: 0
SHORTNESS OF BREATH: 1
COUGH: 0
BACK PAIN: 1
EYES NEGATIVE: 1
BLOOD IN STOOL: 0

## 2025-07-07 NOTE — PROGRESS NOTES
HPI  Location: Back Pain   Patient states that her pain is at a 4 at rest and a 8 with activity on the pain scale.   Patient is currently prescribed gabapentin (NEURONTIN) 600 MG capsule . Patient states she receives some relief from medication.  Patient is currently prescribed Baclofen (LIORESAL) 5 MG tablet . Patient states she receives some relief from medication.  Patient is currently prescribed DULoxetine (CYMBALTA) 60 MG extended release capsule. Patient states she receives some relief from medication.  
(WELLBUTRIN XL) 300 MG extended release tablet, Take 1 tablet by mouth every morning, Disp: 60 tablet, Rfl: 3    DULoxetine (CYMBALTA) 60 MG extended release capsule, Take 1 capsule by mouth daily, Disp: 90 capsule, Rfl: 1    nystatin (MYCOSTATIN) 136413 UNIT/GM powder, Apply 3 times daily., Disp: 60 g, Rfl: 1    losartan (COZAAR) 25 MG tablet, Take 1 tablet by mouth daily, Disp: 90 tablet, Rfl: 1    metFORMIN (GLUCOPHAGE-XR) 500 MG extended release tablet, Take 1 tablet by mouth daily (with breakfast), Disp: 90 tablet, Rfl: 1    Handicap Placard MISC, by Does not apply route PERMANENT - NO EXPIRATION DATE, Disp: 1 each, Rfl: 0    traMADol (ULTRAM) 50 MG tablet, Take 1 tablet by mouth every 6 hours as needed for Pain. (Patient not taking: Reported on 2025), Disp: , Rfl:    Past Surgical History:   Procedure Laterality Date     SECTION       SECTION      COLONOSCOPY N/A 07/10/2024    rectal polyp x1, colon polyps x2, 3y repeat (DR AHN)  COLONOSCOPY BIOPSY WITH POLYPECTOMIES performed by Mitesh Ahn MD at [facility]    DILATION AND CURETTAGE OF UTERUS N/A 07/15/2024    EXAMINATION UNDER ANESTHESIA performed by Bernard Charlton DO at [facility]    [facility]      ORTHOPEDIC SURGERY Left     tendon injury to thumb    PAIN MANAGEMENT PROCEDURE N/A 2024    BILATERAL L4-L5 AND L5-S1 MEDIAL BRANCH BLOCK. [facility]    PAIN MANAGEMENT PROCEDURE Bilateral 10/24/2024    PHASE 2 L4-L4 AND L5-S1 BILATERAL LUMBAR MEDIAL BRANCH BLOCK [facility]    PAIN MANAGEMENT PROCEDURE Bilateral 12/3/2024    BILATERAL L4-L5 AND L5-S1 RFA NERVE RADIOFREQUENCY ABLATION performed by Ta Guzman DO at [facility] OR    TONSILLECTOMY AND ADENOIDECTOMY Bilateral     TUBAL LIGATION      UPPER GASTROINTESTINAL ENDOSCOPY N/A 07/10/2024    esophagitis at GE junction (DR AHN) EGD ESOPHAGOGASTRODUODENOSCOPY WITH

## 2025-07-08 ENCOUNTER — OFFICE VISIT (OUTPATIENT)
Age: 55
End: 2025-07-08
Payer: COMMERCIAL

## 2025-07-08 ENCOUNTER — PREP FOR PROCEDURE (OUTPATIENT)
Age: 55
End: 2025-07-08

## 2025-07-08 VITALS
BODY MASS INDEX: 51.87 KG/M2 | WEIGHT: 264.2 LBS | OXYGEN SATURATION: 99 % | DIASTOLIC BLOOD PRESSURE: 78 MMHG | RESPIRATION RATE: 18 BRPM | HEART RATE: 72 BPM | SYSTOLIC BLOOD PRESSURE: 122 MMHG | TEMPERATURE: 98 F | HEIGHT: 60 IN

## 2025-07-08 DIAGNOSIS — J98.4 RESTRICTIVE LUNG DISEASE: ICD-10-CM

## 2025-07-08 DIAGNOSIS — I51.89 DIASTOLIC DYSFUNCTION: ICD-10-CM

## 2025-07-08 DIAGNOSIS — I27.20 PULMONARY HYPERTENSION (HCC): ICD-10-CM

## 2025-07-08 DIAGNOSIS — G47.33 OBSTRUCTIVE SLEEP APNEA ON CPAP: ICD-10-CM

## 2025-07-08 DIAGNOSIS — G47.33 OSA (OBSTRUCTIVE SLEEP APNEA): Primary | ICD-10-CM

## 2025-07-08 DIAGNOSIS — Z87.891 PERSONAL HISTORY OF TOBACCO USE: ICD-10-CM

## 2025-07-08 PROCEDURE — 99213 OFFICE O/P EST LOW 20 MIN: CPT | Performed by: INTERNAL MEDICINE

## 2025-07-08 PROCEDURE — 1036F TOBACCO NON-USER: CPT | Performed by: INTERNAL MEDICINE

## 2025-07-08 PROCEDURE — 3017F COLORECTAL CA SCREEN DOC REV: CPT | Performed by: INTERNAL MEDICINE

## 2025-07-08 PROCEDURE — G8417 CALC BMI ABV UP PARAM F/U: HCPCS | Performed by: INTERNAL MEDICINE

## 2025-07-08 PROCEDURE — 3078F DIAST BP <80 MM HG: CPT | Performed by: INTERNAL MEDICINE

## 2025-07-08 PROCEDURE — 3074F SYST BP LT 130 MM HG: CPT | Performed by: INTERNAL MEDICINE

## 2025-07-08 PROCEDURE — 99214 OFFICE O/P EST MOD 30 MIN: CPT | Performed by: INTERNAL MEDICINE

## 2025-07-08 PROCEDURE — G8427 DOCREV CUR MEDS BY ELIG CLIN: HCPCS | Performed by: INTERNAL MEDICINE

## 2025-07-08 ASSESSMENT — SLEEP AND FATIGUE QUESTIONNAIRES
HOW LIKELY ARE YOU TO NOD OFF OR FALL ASLEEP WHILE WATCHING TV: SLIGHT CHANCE OF DOZING
ESS TOTAL SCORE: 8
HOW LIKELY ARE YOU TO NOD OFF OR FALL ASLEEP WHILE SITTING AND TALKING TO SOMEONE: WOULD NEVER DOZE
HOW LIKELY ARE YOU TO NOD OFF OR FALL ASLEEP WHILE SITTING QUIETLY AFTER LUNCH WITHOUT ALCOHOL: SLIGHT CHANCE OF DOZING
HOW LIKELY ARE YOU TO NOD OFF OR FALL ASLEEP WHILE SITTING AND READING: SLIGHT CHANCE OF DOZING
HOW LIKELY ARE YOU TO NOD OFF OR FALL ASLEEP IN A CAR, WHILE STOPPED FOR A FEW MINUTES IN TRAFFIC: WOULD NEVER DOZE
HOW LIKELY ARE YOU TO NOD OFF OR FALL ASLEEP WHEN YOU ARE A PASSENGER IN A CAR FOR AN HOUR WITHOUT A BREAK: MODERATE CHANCE OF DOZING
HOW LIKELY ARE YOU TO NOD OFF OR FALL ASLEEP WHILE SITTING INACTIVE IN A PUBLIC PLACE: SLIGHT CHANCE OF DOZING
HOW LIKELY ARE YOU TO NOD OFF OR FALL ASLEEP WHILE LYING DOWN TO REST IN THE AFTERNOON WHEN CIRCUMSTANCES PERMIT: MODERATE CHANCE OF DOZING

## 2025-07-08 NOTE — PROGRESS NOTES
tape]  Past Medical History:   Diagnosis Date    Abnormal Pap smear of cervix     Cerebral artery occlusion with cerebral infarction ([facility])     CHF (congestive heart failure) ([facility]) ?    Chlamydia     Closed wedge compression fracture of L3 vertebra with routine healing 2024    COPD (chronic obstructive pulmonary disease) ([facility])     COPD (chronic obstructive pulmonary disease) ([facility]) 2022    Depression     Drug effect     plastic  tape    Esophagitis determined by endoscopy 07/10/2024    On EGD 07/10/2024 - noted at GE junction    Heart disease 2014    History of abnormal cervical Pap smear 2024    History of blood transfusion     post delivery 32 yrs ago    History of colon polyps 07/10/2024    History of tobacco use 2024    Hyperlipidemia     Hypertension 1990 cont    Lumbar spondylosis 2024    Mass of multiple sites of right breast 2024    Menopausal symptoms finished     MI (myocardial infarction) ([facility])     Microalbuminuria due to type 2 diabetes mellitus ([facility]) 2024    Migraine 1980    Morbid obesity with BMI of 60.0-69.9, adult ([facility]) 2024    MS (multiple sclerosis) ([facility])     Obesity 2024    Pre-diabetes 2024     labor 1992    Restless legs syndrome 20 plus years never dianosed    Restrictive lung disease 2024    Rh incompatibility     Sleep apnea possible    TB (tuberculosis) 1999    Thrombocytopenia 2024    Tricompartment osteoarthritis of left knee 2024    Type 2 diabetes mellitus with diabetic microalbuminuria, without long-term current use of insulin ([facility]) 2024    Type 2 diabetes mellitus without complication ([facility])     Ulcer of abdomen wall ([facility]) 10/17/2024     Past Surgical History:   Procedure Laterality Date     SECTION       SECTION      COLONOSCOPY N/A 07/10/2024    rectal polyp x1, colon polyps x2, 3y repeat (DR CAPPS)  COLONOSCOPY BIOPSY WITH

## 2025-07-16 ENCOUNTER — HOSPITAL ENCOUNTER (OUTPATIENT)
Dept: PREADMISSION TESTING | Age: 55
Discharge: HOME | End: 2025-07-20
Payer: COMMERCIAL

## 2025-07-16 VITALS
OXYGEN SATURATION: 99 % | TEMPERATURE: 97.9 F | SYSTOLIC BLOOD PRESSURE: 121 MMHG | BODY MASS INDEX: 50.11 KG/M2 | RESPIRATION RATE: 16 BRPM | HEIGHT: 61 IN | WEIGHT: 265.4 LBS | DIASTOLIC BLOOD PRESSURE: 70 MMHG | HEART RATE: 86 BPM

## 2025-07-16 LAB
EKG ATRIAL RATE: 76 BPM
EKG DIAGNOSIS: NORMAL
EKG P AXIS: 42 DEGREES
EKG P-R INTERVAL: 168 MS
EKG Q-T INTERVAL: 396 MS
EKG QRS DURATION: 80 MS
EKG QTC CALCULATION (BAZETT): 445 MS
EKG R AXIS: 66 DEGREES
EKG T AXIS: 58 DEGREES
EKG VENTRICULAR RATE: 76 BPM

## 2025-07-16 PROCEDURE — 93005 ELECTROCARDIOGRAM TRACING: CPT | Performed by: SURGERY

## 2025-07-16 RX ORDER — PRAMIPEXOLE DIHYDROCHLORIDE 0.5 MG/1
0.5 TABLET ORAL NIGHTLY
COMMUNITY

## 2025-07-22 ENCOUNTER — ANESTHESIA (OUTPATIENT)
Dept: OPERATING ROOM | Age: 55
End: 2025-07-22
Payer: COMMERCIAL

## 2025-07-22 ENCOUNTER — HOSPITAL ENCOUNTER (INPATIENT)
Age: 55
LOS: 1 days | Discharge: HOME | End: 2025-07-23
Attending: SURGERY | Admitting: SURGERY
Payer: COMMERCIAL

## 2025-07-22 ENCOUNTER — ANESTHESIA EVENT (OUTPATIENT)
Dept: OPERATING ROOM | Age: 55
End: 2025-07-22
Payer: COMMERCIAL

## 2025-07-22 DIAGNOSIS — E66.01 MORBID (SEVERE) OBESITY DUE TO EXCESS CALORIES (HCC): Primary | ICD-10-CM

## 2025-07-22 LAB
GLUCOSE BLD-MCNC: 105 MG/DL (ref 70–99)
GLUCOSE BLD-MCNC: 141 MG/DL (ref 70–99)
GLUCOSE BLD-MCNC: 175 MG/DL (ref 70–99)
PERFORMED ON: ABNORMAL

## 2025-07-22 PROCEDURE — 2580000003 HC RX 258: Performed by: SURGERY

## 2025-07-22 PROCEDURE — 6360000002 HC RX W HCPCS: Performed by: SURGERY

## 2025-07-22 PROCEDURE — 0DNU4ZZ RELEASE OMENTUM, PERCUTANEOUS ENDOSCOPIC APPROACH: ICD-10-PCS | Performed by: SURGERY

## 2025-07-22 PROCEDURE — 3700000000 HC ANESTHESIA ATTENDED CARE: Performed by: SURGERY

## 2025-07-22 PROCEDURE — 1210000000 HC MED SURG R&B

## 2025-07-22 PROCEDURE — 2709999900 HC NON-CHARGEABLE SUPPLY: Performed by: SURGERY

## 2025-07-22 PROCEDURE — 6370000000 HC RX 637 (ALT 250 FOR IP): Performed by: SURGERY

## 2025-07-22 PROCEDURE — 6360000002 HC RX W HCPCS: Performed by: ANESTHESIOLOGY

## 2025-07-22 PROCEDURE — 2500000003 HC RX 250 WO HCPCS: Performed by: ANESTHESIOLOGIST ASSISTANT

## 2025-07-22 PROCEDURE — 7100000000 HC PACU RECOVERY - FIRST 15 MIN: Performed by: SURGERY

## 2025-07-22 PROCEDURE — 3600000009 HC SURGERY ROBOT BASE: Performed by: SURGERY

## 2025-07-22 PROCEDURE — 64488 TAP BLOCK BI INJECTION: CPT | Performed by: ANESTHESIOLOGY

## 2025-07-22 PROCEDURE — 2720000010 HC SURG SUPPLY STERILE: Performed by: SURGERY

## 2025-07-22 PROCEDURE — 2500000003 HC RX 250 WO HCPCS: Performed by: SURGERY

## 2025-07-22 PROCEDURE — 8E0W4CZ ROBOTIC ASSISTED PROCEDURE OF TRUNK REGION, PERCUTANEOUS ENDOSCOPIC APPROACH: ICD-10-PCS | Performed by: SURGERY

## 2025-07-22 PROCEDURE — 3E0T3BZ INTRODUCTION OF ANESTHETIC AGENT INTO PERIPHERAL NERVES AND PLEXI, PERCUTANEOUS APPROACH: ICD-10-PCS | Performed by: SURGERY

## 2025-07-22 PROCEDURE — 3700000001 HC ADD 15 MINUTES (ANESTHESIA): Performed by: SURGERY

## 2025-07-22 PROCEDURE — 6360000002 HC RX W HCPCS: Performed by: ANESTHESIOLOGIST ASSISTANT

## 2025-07-22 PROCEDURE — 0D164ZA BYPASS STOMACH TO JEJUNUM, PERCUTANEOUS ENDOSCOPIC APPROACH: ICD-10-PCS | Performed by: SURGERY

## 2025-07-22 PROCEDURE — 7100000001 HC PACU RECOVERY - ADDTL 15 MIN: Performed by: SURGERY

## 2025-07-22 PROCEDURE — 3600000019 HC SURGERY ROBOT ADDTL 15MIN: Performed by: SURGERY

## 2025-07-22 PROCEDURE — 43645 LAP GASTR BYPASS INCL SMLL I: CPT | Performed by: SURGERY

## 2025-07-22 PROCEDURE — S2900 ROBOTIC SURGICAL SYSTEM: HCPCS | Performed by: SURGERY

## 2025-07-22 RX ORDER — ONDANSETRON 4 MG/1
4 TABLET, ORALLY DISINTEGRATING ORAL EVERY 8 HOURS PRN
Status: DISCONTINUED | OUTPATIENT
Start: 2025-07-22 | End: 2025-07-23 | Stop reason: HOSPADM

## 2025-07-22 RX ORDER — ACETAMINOPHEN 160 MG
2000 TABLET,DISINTEGRATING ORAL DAILY
Status: ON HOLD | COMMUNITY
End: 2025-07-23 | Stop reason: HOSPADM

## 2025-07-22 RX ORDER — ROCURONIUM BROMIDE 10 MG/ML
INJECTION, SOLUTION INTRAVENOUS
Status: DISCONTINUED | OUTPATIENT
Start: 2025-07-22 | End: 2025-07-22 | Stop reason: SDUPTHER

## 2025-07-22 RX ORDER — SODIUM CHLORIDE 0.9 % (FLUSH) 0.9 %
5-40 SYRINGE (ML) INJECTION EVERY 12 HOURS SCHEDULED
Status: DISCONTINUED | OUTPATIENT
Start: 2025-07-22 | End: 2025-07-22 | Stop reason: HOSPADM

## 2025-07-22 RX ORDER — HYDRALAZINE HYDROCHLORIDE 20 MG/ML
10 INJECTION INTRAMUSCULAR; INTRAVENOUS EVERY 4 HOURS PRN
Status: DISCONTINUED | OUTPATIENT
Start: 2025-07-22 | End: 2025-07-23 | Stop reason: HOSPADM

## 2025-07-22 RX ORDER — OXYCODONE HYDROCHLORIDE 5 MG/1
10 TABLET ORAL EVERY 4 HOURS PRN
Refills: 0 | Status: DISCONTINUED | OUTPATIENT
Start: 2025-07-23 | End: 2025-07-23 | Stop reason: HOSPADM

## 2025-07-22 RX ORDER — SODIUM CHLORIDE 0.9 % (FLUSH) 0.9 %
5-40 SYRINGE (ML) INJECTION PRN
Status: DISCONTINUED | OUTPATIENT
Start: 2025-07-22 | End: 2025-07-22 | Stop reason: HOSPADM

## 2025-07-22 RX ORDER — METOCLOPRAMIDE HYDROCHLORIDE 5 MG/ML
10 INJECTION INTRAMUSCULAR; INTRAVENOUS
Status: DISCONTINUED | OUTPATIENT
Start: 2025-07-22 | End: 2025-07-22 | Stop reason: HOSPADM

## 2025-07-22 RX ORDER — ONDANSETRON 2 MG/ML
4 INJECTION INTRAMUSCULAR; INTRAVENOUS ONCE
Status: COMPLETED | OUTPATIENT
Start: 2025-07-22 | End: 2025-07-22

## 2025-07-22 RX ORDER — SODIUM CHLORIDE 0.9 % (FLUSH) 0.9 %
5-40 SYRINGE (ML) INJECTION PRN
Status: DISCONTINUED | OUTPATIENT
Start: 2025-07-22 | End: 2025-07-23 | Stop reason: HOSPADM

## 2025-07-22 RX ORDER — ACETAMINOPHEN 500 MG
1000 TABLET ORAL ONCE
Status: COMPLETED | OUTPATIENT
Start: 2025-07-22 | End: 2025-07-22

## 2025-07-22 RX ORDER — HYDROMORPHONE HYDROCHLORIDE 1 MG/ML
1 INJECTION, SOLUTION INTRAMUSCULAR; INTRAVENOUS; SUBCUTANEOUS
Refills: 0 | Status: DISCONTINUED | OUTPATIENT
Start: 2025-07-22 | End: 2025-07-23 | Stop reason: HOSPADM

## 2025-07-22 RX ORDER — GLUCAGON 1 MG/ML
1 KIT INJECTION PRN
Status: DISCONTINUED | OUTPATIENT
Start: 2025-07-22 | End: 2025-07-23 | Stop reason: HOSPADM

## 2025-07-22 RX ORDER — MAGNESIUM HYDROXIDE 1200 MG/15ML
LIQUID ORAL CONTINUOUS PRN
Status: DISCONTINUED | OUTPATIENT
Start: 2025-07-22 | End: 2025-07-22 | Stop reason: HOSPADM

## 2025-07-22 RX ORDER — INSULIN LISPRO 100 [IU]/ML
0-8 INJECTION, SOLUTION INTRAVENOUS; SUBCUTANEOUS 3 TIMES DAILY
Status: DISCONTINUED | OUTPATIENT
Start: 2025-07-22 | End: 2025-07-23 | Stop reason: HOSPADM

## 2025-07-22 RX ORDER — DEXTROSE MONOHYDRATE 100 MG/ML
INJECTION, SOLUTION INTRAVENOUS CONTINUOUS PRN
Status: DISCONTINUED | OUTPATIENT
Start: 2025-07-22 | End: 2025-07-23 | Stop reason: HOSPADM

## 2025-07-22 RX ORDER — ONDANSETRON 2 MG/ML
INJECTION INTRAMUSCULAR; INTRAVENOUS
Status: DISCONTINUED | OUTPATIENT
Start: 2025-07-22 | End: 2025-07-22 | Stop reason: SDUPTHER

## 2025-07-22 RX ORDER — SODIUM CHLORIDE 9 MG/ML
INJECTION, SOLUTION INTRAVENOUS CONTINUOUS
Status: DISCONTINUED | OUTPATIENT
Start: 2025-07-22 | End: 2025-07-23 | Stop reason: HOSPADM

## 2025-07-22 RX ORDER — SODIUM CHLORIDE 9 MG/ML
INJECTION, SOLUTION INTRAVENOUS PRN
Status: DISCONTINUED | OUTPATIENT
Start: 2025-07-22 | End: 2025-07-23 | Stop reason: HOSPADM

## 2025-07-22 RX ORDER — LIDOCAINE HYDROCHLORIDE 10 MG/ML
INJECTION, SOLUTION EPIDURAL; INFILTRATION; INTRACAUDAL; PERINEURAL
Status: DISCONTINUED | OUTPATIENT
Start: 2025-07-22 | End: 2025-07-22 | Stop reason: SDUPTHER

## 2025-07-22 RX ORDER — MEPERIDINE HYDROCHLORIDE 25 MG/ML
12.5 INJECTION INTRAMUSCULAR; INTRAVENOUS; SUBCUTANEOUS
Status: DISCONTINUED | OUTPATIENT
Start: 2025-07-22 | End: 2025-07-22 | Stop reason: HOSPADM

## 2025-07-22 RX ORDER — BUPRENORPHINE HYDROCHLORIDE 0.32 MG/ML
INJECTION INTRAMUSCULAR; INTRAVENOUS
Status: DISCONTINUED | OUTPATIENT
Start: 2025-07-22 | End: 2025-07-22 | Stop reason: SDUPTHER

## 2025-07-22 RX ORDER — PROPOFOL 10 MG/ML
INJECTION, EMULSION INTRAVENOUS
Status: DISCONTINUED | OUTPATIENT
Start: 2025-07-22 | End: 2025-07-22 | Stop reason: SDUPTHER

## 2025-07-22 RX ORDER — DULOXETIN HYDROCHLORIDE 60 MG/1
60 CAPSULE, DELAYED RELEASE ORAL DAILY
Status: DISCONTINUED | OUTPATIENT
Start: 2025-07-23 | End: 2025-07-23 | Stop reason: HOSPADM

## 2025-07-22 RX ORDER — HEPARIN SODIUM 5000 [USP'U]/ML
2500 INJECTION, SOLUTION INTRAVENOUS; SUBCUTANEOUS 2 TIMES DAILY
Status: DISCONTINUED | OUTPATIENT
Start: 2025-07-22 | End: 2025-07-23 | Stop reason: HOSPADM

## 2025-07-22 RX ORDER — SODIUM CHLORIDE 9 MG/ML
INJECTION, SOLUTION INTRAVENOUS PRN
Status: DISCONTINUED | OUTPATIENT
Start: 2025-07-22 | End: 2025-07-22 | Stop reason: HOSPADM

## 2025-07-22 RX ORDER — FENTANYL CITRATE 0.05 MG/ML
50 INJECTION, SOLUTION INTRAMUSCULAR; INTRAVENOUS EVERY 10 MIN PRN
Status: DISCONTINUED | OUTPATIENT
Start: 2025-07-22 | End: 2025-07-22 | Stop reason: HOSPADM

## 2025-07-22 RX ORDER — ONDANSETRON 2 MG/ML
4 INJECTION INTRAMUSCULAR; INTRAVENOUS
Status: COMPLETED | OUTPATIENT
Start: 2025-07-22 | End: 2025-07-22

## 2025-07-22 RX ORDER — ONDANSETRON 2 MG/ML
4 INJECTION INTRAMUSCULAR; INTRAVENOUS EVERY 6 HOURS PRN
Status: DISCONTINUED | OUTPATIENT
Start: 2025-07-22 | End: 2025-07-23 | Stop reason: HOSPADM

## 2025-07-22 RX ORDER — HEPARIN SODIUM 5000 [USP'U]/ML
2500 INJECTION, SOLUTION INTRAVENOUS; SUBCUTANEOUS ONCE
Status: COMPLETED | OUTPATIENT
Start: 2025-07-22 | End: 2025-07-22

## 2025-07-22 RX ORDER — OXYCODONE HYDROCHLORIDE 5 MG/1
5 TABLET ORAL EVERY 4 HOURS PRN
Refills: 0 | Status: DISCONTINUED | OUTPATIENT
Start: 2025-07-23 | End: 2025-07-23 | Stop reason: HOSPADM

## 2025-07-22 RX ORDER — OXYCODONE HYDROCHLORIDE 5 MG/1
5 TABLET ORAL
Status: DISCONTINUED | OUTPATIENT
Start: 2025-07-22 | End: 2025-07-22 | Stop reason: HOSPADM

## 2025-07-22 RX ORDER — MIDAZOLAM HYDROCHLORIDE 1 MG/ML
INJECTION, SOLUTION INTRAMUSCULAR; INTRAVENOUS
Status: DISCONTINUED | OUTPATIENT
Start: 2025-07-22 | End: 2025-07-22 | Stop reason: SDUPTHER

## 2025-07-22 RX ORDER — DEXAMETHASONE SODIUM PHOSPHATE 4 MG/ML
INJECTION, SOLUTION INTRA-ARTICULAR; INTRALESIONAL; INTRAMUSCULAR; INTRAVENOUS; SOFT TISSUE
Status: DISCONTINUED | OUTPATIENT
Start: 2025-07-22 | End: 2025-07-22 | Stop reason: SDUPTHER

## 2025-07-22 RX ORDER — DIPHENHYDRAMINE HYDROCHLORIDE 50 MG/ML
12.5 INJECTION, SOLUTION INTRAMUSCULAR; INTRAVENOUS
Status: DISCONTINUED | OUTPATIENT
Start: 2025-07-22 | End: 2025-07-22 | Stop reason: HOSPADM

## 2025-07-22 RX ORDER — ROPIVACAINE HYDROCHLORIDE 5 MG/ML
INJECTION, SOLUTION EPIDURAL; INFILTRATION; PERINEURAL
Status: COMPLETED | OUTPATIENT
Start: 2025-07-22 | End: 2025-07-22

## 2025-07-22 RX ORDER — SODIUM CHLORIDE 0.9 % (FLUSH) 0.9 %
5-40 SYRINGE (ML) INJECTION EVERY 12 HOURS SCHEDULED
Status: DISCONTINUED | OUTPATIENT
Start: 2025-07-22 | End: 2025-07-23 | Stop reason: HOSPADM

## 2025-07-22 RX ORDER — SODIUM CHLORIDE, SODIUM LACTATE, POTASSIUM CHLORIDE, CALCIUM CHLORIDE 600; 310; 30; 20 MG/100ML; MG/100ML; MG/100ML; MG/100ML
INJECTION, SOLUTION INTRAVENOUS CONTINUOUS
Status: DISCONTINUED | OUTPATIENT
Start: 2025-07-22 | End: 2025-07-22 | Stop reason: HOSPADM

## 2025-07-22 RX ORDER — SUCCINYLCHOLINE/SOD CL,ISO/PF 100 MG/5ML
SYRINGE (ML) INTRAVENOUS
Status: DISCONTINUED | OUTPATIENT
Start: 2025-07-22 | End: 2025-07-22 | Stop reason: SDUPTHER

## 2025-07-22 RX ADMIN — Medication 100 MG: at 11:01

## 2025-07-22 RX ADMIN — FENTANYL CITRATE 50 MCG: 0.05 INJECTION, SOLUTION INTRAMUSCULAR; INTRAVENOUS at 14:13

## 2025-07-22 RX ADMIN — ROPIVACAINE HYDROCHLORIDE 30 ML: 5 INJECTION, SOLUTION EPIDURAL; INFILTRATION; PERINEURAL at 11:02

## 2025-07-22 RX ADMIN — DEXAMETHASONE SODIUM PHOSPHATE 4 MG: 4 INJECTION INTRA-ARTICULAR; INTRALESIONAL; INTRAMUSCULAR; INTRAVENOUS; SOFT TISSUE at 11:05

## 2025-07-22 RX ADMIN — LIDOCAINE HYDROCHLORIDE 50 MG: 10 INJECTION, SOLUTION EPIDURAL; INFILTRATION; INTRACAUDAL; PERINEURAL at 11:01

## 2025-07-22 RX ADMIN — BUPRENORPHINE HYDROCHLORIDE 0.3 MG: 0.32 INJECTION INTRAMUSCULAR; INTRAVENOUS at 11:02

## 2025-07-22 RX ADMIN — HYDROMORPHONE HYDROCHLORIDE 1 MG: 1 INJECTION, SOLUTION INTRAMUSCULAR; INTRAVENOUS; SUBCUTANEOUS at 22:34

## 2025-07-22 RX ADMIN — SODIUM CHLORIDE, SODIUM LACTATE, POTASSIUM CHLORIDE, AND CALCIUM CHLORIDE: .6; .31; .03; .02 INJECTION, SOLUTION INTRAVENOUS at 13:11

## 2025-07-22 RX ADMIN — SODIUM CHLORIDE: 0.9 INJECTION, SOLUTION INTRAVENOUS at 15:26

## 2025-07-22 RX ADMIN — SODIUM CHLORIDE, PRESERVATIVE FREE 10 ML: 5 INJECTION INTRAVENOUS at 22:35

## 2025-07-22 RX ADMIN — ROCURONIUM BROMIDE 20 MG: 10 INJECTION, SOLUTION INTRAVENOUS at 13:28

## 2025-07-22 RX ADMIN — SODIUM CHLORIDE, SODIUM LACTATE, POTASSIUM CHLORIDE, AND CALCIUM CHLORIDE 1000 ML: .6; .31; .03; .02 INJECTION, SOLUTION INTRAVENOUS at 09:25

## 2025-07-22 RX ADMIN — ONDANSETRON 4 MG: 2 INJECTION, SOLUTION INTRAMUSCULAR; INTRAVENOUS at 09:35

## 2025-07-22 RX ADMIN — ROCURONIUM BROMIDE 10 MG: 10 INJECTION, SOLUTION INTRAVENOUS at 11:01

## 2025-07-22 RX ADMIN — ROCURONIUM BROMIDE 40 MG: 10 INJECTION, SOLUTION INTRAVENOUS at 11:05

## 2025-07-22 RX ADMIN — HYDROMORPHONE HYDROCHLORIDE 1 MG: 1 INJECTION, SOLUTION INTRAMUSCULAR; INTRAVENOUS; SUBCUTANEOUS at 18:49

## 2025-07-22 RX ADMIN — CEFAZOLIN 3000 MG: 3 INJECTION, POWDER, FOR SOLUTION INTRAVENOUS at 11:05

## 2025-07-22 RX ADMIN — MIDAZOLAM HYDROCHLORIDE 2 MG: 1 INJECTION, SOLUTION INTRAMUSCULAR; INTRAVENOUS at 10:55

## 2025-07-22 RX ADMIN — ACETAMINOPHEN 1000 MG: 500 TABLET ORAL at 09:33

## 2025-07-22 RX ADMIN — PROPOFOL 200 MG: 10 INJECTION, EMULSION INTRAVENOUS at 11:01

## 2025-07-22 RX ADMIN — ONDANSETRON 4 MG: 2 INJECTION, SOLUTION INTRAMUSCULAR; INTRAVENOUS at 13:41

## 2025-07-22 RX ADMIN — ONDANSETRON 4 MG: 2 INJECTION, SOLUTION INTRAMUSCULAR; INTRAVENOUS at 23:00

## 2025-07-22 RX ADMIN — ONDANSETRON 4 MG: 2 INJECTION, SOLUTION INTRAMUSCULAR; INTRAVENOUS at 15:19

## 2025-07-22 RX ADMIN — ONDANSETRON 4 MG: 2 INJECTION, SOLUTION INTRAMUSCULAR; INTRAVENOUS at 14:51

## 2025-07-22 RX ADMIN — HEPARIN SODIUM 2500 UNITS: 5000 INJECTION INTRAVENOUS; SUBCUTANEOUS at 09:38

## 2025-07-22 RX ADMIN — ROCURONIUM BROMIDE 30 MG: 10 INJECTION, SOLUTION INTRAVENOUS at 12:02

## 2025-07-22 RX ADMIN — FENTANYL CITRATE 50 MCG: 0.05 INJECTION, SOLUTION INTRAMUSCULAR; INTRAVENOUS at 14:31

## 2025-07-22 RX ADMIN — WATER 3000 MG: 1 INJECTION INTRAMUSCULAR; INTRAVENOUS; SUBCUTANEOUS at 22:33

## 2025-07-22 RX ADMIN — SODIUM CHLORIDE: 0.9 INJECTION, SOLUTION INTRAVENOUS at 22:57

## 2025-07-22 RX ADMIN — HEPARIN SODIUM 2500 UNITS: 5000 INJECTION, SOLUTION INTRAVENOUS; SUBCUTANEOUS at 22:34

## 2025-07-22 RX ADMIN — SUGAMMADEX 400 MG: 100 INJECTION, SOLUTION INTRAVENOUS at 13:50

## 2025-07-22 RX ADMIN — HYDROMORPHONE HYDROCHLORIDE 1 MG: 1 INJECTION, SOLUTION INTRAMUSCULAR; INTRAVENOUS; SUBCUTANEOUS at 15:19

## 2025-07-22 ASSESSMENT — PAIN - FUNCTIONAL ASSESSMENT
PAIN_FUNCTIONAL_ASSESSMENT: PREVENTS OR INTERFERES SOME ACTIVE ACTIVITIES AND ADLS
PAIN_FUNCTIONAL_ASSESSMENT: ACTIVITIES ARE NOT PREVENTED
PAIN_FUNCTIONAL_ASSESSMENT: 0-10

## 2025-07-22 ASSESSMENT — PAIN SCALES - GENERAL
PAINLEVEL_OUTOF10: 7
PAINLEVEL_OUTOF10: 4
PAINLEVEL_OUTOF10: 5
PAINLEVEL_OUTOF10: 3
PAINLEVEL_OUTOF10: 6
PAINLEVEL_OUTOF10: 6
PAINLEVEL_OUTOF10: 7
PAINLEVEL_OUTOF10: 3

## 2025-07-22 ASSESSMENT — PAIN DESCRIPTION - DESCRIPTORS
DESCRIPTORS: ACHING;DISCOMFORT
DESCRIPTORS: DISCOMFORT
DESCRIPTORS: ACHING
DESCRIPTORS: DISCOMFORT

## 2025-07-22 ASSESSMENT — PAIN DESCRIPTION - LOCATION
LOCATION: ABDOMEN
LOCATION: ABDOMEN
LOCATION: BACK
LOCATION: BACK;ABDOMEN
LOCATION: ABDOMEN
LOCATION: ABDOMEN;BACK

## 2025-07-22 ASSESSMENT — PAIN DESCRIPTION - ORIENTATION
ORIENTATION: INNER
ORIENTATION: INNER

## 2025-07-22 ASSESSMENT — COPD QUESTIONNAIRES: CAT_SEVERITY: MILD

## 2025-07-22 NOTE — OP NOTE
Operative Note      Patient: Lakshmi Rebolledo  YOB: 1970  MRN: [id number]    Date of Procedure: 7/22/2025    Pre-Op Diagnosis Codes:      * Type 2 diabetes mellitus with microalbuminuria, without long-term current use of insulin (HCC) [E11.29, R80.9]     * Obstructive sleep apnea on CPAP [G47.33]     * GERD (gastroesophageal reflux disease) [K21.9]     * Hypertension associated with diabetes (HCC) [E11.59, I15.2]     * Tricompartment osteoarthritis of left knee [M17.12]     * Morbid (severe) obesity due to excess calories ([facility]) [E66.01]    Post-Op Diagnosis: Same       Procedure(s):  ROBOTIC LAPAROSCOPIC GASTRIC BYPASS DEJA-EN-Y LYSIS OF ADHESIONS MODIFIER 22 and DEJA LIMB > 150CM.    Surgeon(s):  Mitesh Ahn MD    Assistant:   Physician Assistant: Yenny Mcduffie PA-C    Anesthesia: General    Estimated Blood Loss (mL): less than 50     Complications: None    Specimens:   * No specimens in log *    Implants:  * No implants in log *      Drains:   Closed/Suction Drain LLQ Bulb (Active)       Urinary Catheter 07/22/25 (Active)       Findings:  Present At Time Of Surgery (PATOS) (choose all levels that have infection present):  No infection present  Other Findings: Extensive adhesions of the greater omentum to the anterior abdominal wall from prior surgery.    Detailed Description of Procedure:   BMI 48.35    The patient was brought to the operating room placed in the supine position on the operating table. After obtaining adequate general endotracheal anesthesia, a TAP block was performed bilaterally by Anesthesia.  The patient was prepped and draped in usual sterile manner with chlorhexidine paint.  A proper timeout procedure was taken to correctly identify the patient and procedure.     The abdomen was then entered under videoscopic visualization using a zero-degree 5mm  laparoscope and a 8 mm Visiport trocar. The trocar obturator and camera were removed and the abdomen was insufflated to 15

## 2025-07-22 NOTE — ANESTHESIA POSTPROCEDURE EVALUATION
Department of Anesthesiology  Postprocedure Note    Patient: Lakshmi Rebolledo  MRN: [id number]  YOB: 1970  Date of evaluation: 7/22/2025    Procedure Summary       Date: 07/22/25 Room / Location: [address]    Anesthesia Start: 1055 Anesthesia Stop: 1401    Procedure: ROBOTIC LAPAROSCOPIC GASTRIC BYPASS DEJA-EN-Y LYSIS OF ADHESIONS MODIFIER 22 (Abdomen) Diagnosis:       Type 2 diabetes mellitus with microalbuminuria, without long-term current use of insulin (HCC)      Obstructive sleep apnea on CPAP      GERD (gastroesophageal reflux disease)      Hypertension associated with diabetes (HCC)      Tricompartment osteoarthritis of left knee      Morbid (severe) obesity due to excess calories (HCC)      (Type 2 diabetes mellitus with microalbuminuria, without long-term current use of insulin (HCC) [E11.29, R80.9])      (Obstructive sleep apnea on CPAP [G47.33])      (GERD (gastroesophageal reflux disease) [K21.9])      (Hypertension associated with diabetes (HCC) [E11.59, I15.2])      (Tricompartment osteoarthritis of left knee [M17.12])      (Morbid (severe) obesity due to excess calories (HCC) [E66.01])    Surgeons: Mitesh Ahn MD Responsible Provider: Audrey Ignacio MD    Anesthesia Type: general, regional ASA Status: 3            Anesthesia Type: No value filed.    Fabian Phase I: Fabian Score: 7    Fabian Phase II:      Anesthesia Post Evaluation    Patient location during evaluation: bedside  Patient participation: complete - patient participated  Level of consciousness: awake and sleepy but conscious  Airway patency: patent  Nausea & Vomiting: no nausea and no vomiting  Cardiovascular status: blood pressure returned to baseline and hemodynamically stable  Respiratory status: acceptable  Hydration status: euvolemic  Pain management: adequate        No notable events documented.

## 2025-07-22 NOTE — ANESTHESIA PROCEDURE NOTES
Peripheral Block    Patient location during procedure: OR  Reason for block: post-op pain management and at surgeon's request  Start time: 7/22/2025 11:02 AM  End time: 7/22/2025 11:08 AM  Staffing  Performed: anesthesiologist   Anesthesiologist: Audrey Ignacio MD  Performed by: Audrey Ignacio MD  Authorized by: Audrey Ignacio MD    Preanesthetic Checklist  Completed: patient identified, IV checked, site marked, risks and benefits discussed, surgical/procedural consents, equipment checked, pre-op evaluation, timeout performed, anesthesia consent given, oxygen available and monitors applied/VS acknowledged  Peripheral Block   Patient position: supine  Prep: ChloraPrep  Provider prep: mask and sterile gloves (Sterile probe cover)  Patient monitoring: cardiac monitor, continuous pulse ox, frequent blood pressure checks and IV access  Block type: TAP  Laterality: bilateral  Injection technique: single-shot  Guidance: ultrasound guided  Infiltration strength: 1 %  Dose: 5 mL    Needle   Needle type: combined needle/nerve stimulator   Needle gauge: 21 G  Needle localization: anatomical landmarks and ultrasound guidance  Needle length: 10 cm  Assessment   Injection assessment: negative aspiration for heme, no paresthesia on injection and local visualized surrounding nerve on ultrasound  Paresthesia pain: immediately resolved  Slow fractionated injection: yes  Hemodynamics: stable    Additional Notes  Ultrasound guidance used to view needle for placement.    Ultrasound image stored,  printed and saved in patient chart.    Sterile probe cover used    Medications Administered  ropivacaine (NAROPIN) injection 0.5% - Perineural   30 mL - 7/22/2025 11:02:00 AM

## 2025-07-22 NOTE — INTERVAL H&P NOTE
Update History & Physical    The patient's History and Physical of June 23, 2025 was reviewed with the patient and I examined the patient. There was no change. The surgical site was confirmed by the patient and me.     Plan: The risks, benefits, expected outcome, and alternative to the recommended procedure have been discussed with the patient. Patient understands and wants to proceed with the procedure.     Electronically signed by Mitesh Ahn MD on 7/22/2025 at 10:58 AM

## 2025-07-22 NOTE — ANESTHESIA PRE PROCEDURE
Department of Anesthesiology  Preprocedure Note       Name:  Lakshmi Rebolledo   Age:  55 y.o.  :  1970                                          MRN:  [id number]         Date:  2025      Surgeon: Surgeon(s):  Mitesh Ahn MD    Procedure: Procedure(s):  ROBOTIC LAPAROSCOPIC GASTRIC BYPASS DEJA-EN-Y. WEIGH PATIENT PRIOR TO PROCEDURE    Medications prior to admission:   Prior to Admission medications    Medication Sig Start Date End Date Taking? Authorizing Provider   vitamin D (VITAMIN D3) 50 MCG (2000) CAPS capsule Take 1 capsule by mouth daily   Yes Kory Mandel MD   aprepitant (EMEND) 40 MG capsule  25  Yes Kory Mandel MD   hydrOXYzine HCl (ATARAX) 25 MG tablet Take 1 tablet by mouth 3 times daily as needed for Anxiety 25  Yes Kory Mandel MD   buPROPion (WELLBUTRIN XL) 300 MG extended release tablet Take 1 tablet by mouth every morning 25  Yes Mitesh Ahn MD   traMADol (ULTRAM) 50 MG tablet Take 1 tablet by mouth every 6 hours as needed for Pain. 10/17/24  Yes Kory Mandel MD   pramipexole (MIRAPEX) 0.5 MG tablet Take 1 tablet by mouth nightly    ProviderKory MD   gabapentin (NEURONTIN) 600 MG tablet Take 1 tablet by mouth 3 times daily for 120 days. 25  Ta Guzman DO   Baclofen (LIORESAL) 5 MG tablet Take 1 tablet by mouth 3 times daily as needed (muscle aches) 25   Ta Guzman DO   phentermine (ADIPEX-P) 37.5 MG tablet Take 1 tablet by mouth every morning (before breakfast) for 30 days. Max Daily Amount: 37.5 mg 25  Mitesh Ahn MD   topiramate (TOPAMAX) 50 MG tablet Take 1 tablet by mouth 2 times daily 25   Mitesh Ahn MD   empagliflozin (JARDIANCE) 10 MG tablet Take 1 tablet by mouth daily 25   Mitesh Ahn MD   DULoxetine (CYMBALTA) 60 MG extended release capsule Take 1 capsule by mouth daily 25   Keily

## 2025-07-23 VITALS
RESPIRATION RATE: 17 BRPM | HEART RATE: 83 BPM | SYSTOLIC BLOOD PRESSURE: 121 MMHG | HEIGHT: 58 IN | BODY MASS INDEX: 54.37 KG/M2 | OXYGEN SATURATION: 95 % | TEMPERATURE: 97.7 F | WEIGHT: 259 LBS | DIASTOLIC BLOOD PRESSURE: 69 MMHG

## 2025-07-23 LAB
BASOPHILS # BLD: 0 K/UL (ref 0–0.2)
BASOPHILS NFR BLD: 0.2 %
EOSINOPHIL # BLD: 0 K/UL (ref 0–0.7)
EOSINOPHIL NFR BLD: 0 %
ERYTHROCYTE [DISTWIDTH] IN BLOOD BY AUTOMATED COUNT: 12.1 % (ref 11.5–14.5)
GLUCOSE BLD-MCNC: 120 MG/DL (ref 70–99)
HCT VFR BLD AUTO: 44.2 % (ref 37–47)
HGB BLD-MCNC: 14.9 G/DL (ref 12–16)
LYMPHOCYTES # BLD: 1 K/UL (ref 1–4.8)
LYMPHOCYTES NFR BLD: 5.3 %
MCH RBC QN AUTO: 32.3 PG (ref 27–31.3)
MCHC RBC AUTO-ENTMCNC: 33.7 % (ref 33–37)
MCV RBC AUTO: 95.9 FL (ref 79.4–94.8)
MONOCYTES # BLD: 1.1 K/UL (ref 0.2–0.8)
MONOCYTES NFR BLD: 5.9 %
NEUTROPHILS # BLD: 16.4 K/UL (ref 1.4–6.5)
NEUTS SEG NFR BLD: 88 %
PERFORMED ON: ABNORMAL
PLATELET # BLD AUTO: 216 K/UL (ref 130–400)
RBC # BLD AUTO: 4.61 M/UL (ref 4.2–5.4)
WBC # BLD AUTO: 18.7 K/UL (ref 4.8–10.8)

## 2025-07-23 PROCEDURE — 6360000002 HC RX W HCPCS: Performed by: SURGERY

## 2025-07-23 PROCEDURE — 2580000003 HC RX 258: Performed by: SURGERY

## 2025-07-23 PROCEDURE — 6370000000 HC RX 637 (ALT 250 FOR IP): Performed by: SURGERY

## 2025-07-23 PROCEDURE — 2500000003 HC RX 250 WO HCPCS: Performed by: SURGERY

## 2025-07-23 PROCEDURE — 99024 POSTOP FOLLOW-UP VISIT: CPT | Performed by: SURGERY

## 2025-07-23 PROCEDURE — 85025 COMPLETE CBC W/AUTO DIFF WBC: CPT

## 2025-07-23 PROCEDURE — 36415 COLL VENOUS BLD VENIPUNCTURE: CPT

## 2025-07-23 RX ORDER — ONDANSETRON 4 MG/1
4 TABLET, ORALLY DISINTEGRATING ORAL EVERY 8 HOURS PRN
Qty: 30 TABLET | Refills: 3 | Status: SHIPPED | OUTPATIENT
Start: 2025-07-23

## 2025-07-23 RX ORDER — ENOXAPARIN SODIUM 100 MG/ML
40 INJECTION SUBCUTANEOUS 2 TIMES DAILY
Qty: 24 EACH | Refills: 0 | Status: SHIPPED | OUTPATIENT
Start: 2025-07-23 | End: 2025-08-04

## 2025-07-23 RX ORDER — DOCUSATE SODIUM 100 MG/1
100 CAPSULE, LIQUID FILLED ORAL 2 TIMES DAILY
Qty: 60 CAPSULE | Refills: 0 | Status: SHIPPED | OUTPATIENT
Start: 2025-07-23 | End: 2025-08-22

## 2025-07-23 RX ORDER — LOSARTAN POTASSIUM 25 MG/1
25 TABLET ORAL ONCE
Status: COMPLETED | OUTPATIENT
Start: 2025-07-23 | End: 2025-07-23

## 2025-07-23 RX ORDER — SIMETHICONE 80 MG
80 TABLET,CHEWABLE ORAL 4 TIMES DAILY PRN
Qty: 180 TABLET | Refills: 3 | Status: SHIPPED | OUTPATIENT
Start: 2025-07-23

## 2025-07-23 RX ORDER — ACETAMINOPHEN 500 MG
500 TABLET ORAL 4 TIMES DAILY PRN
Qty: 120 TABLET | Refills: 0 | Status: SHIPPED | OUTPATIENT
Start: 2025-07-23

## 2025-07-23 RX ORDER — OXYCODONE HYDROCHLORIDE 5 MG/1
5 TABLET ORAL EVERY 6 HOURS PRN
Qty: 15 TABLET | Refills: 0 | Status: SHIPPED | OUTPATIENT
Start: 2025-07-23 | End: 2025-07-27

## 2025-07-23 RX ADMIN — WATER 3000 MG: 1 INJECTION INTRAMUSCULAR; INTRAVENOUS; SUBCUTANEOUS at 06:47

## 2025-07-23 RX ADMIN — HYDROMORPHONE HYDROCHLORIDE 1 MG: 1 INJECTION, SOLUTION INTRAMUSCULAR; INTRAVENOUS; SUBCUTANEOUS at 09:48

## 2025-07-23 RX ADMIN — SODIUM CHLORIDE: 0.9 INJECTION, SOLUTION INTRAVENOUS at 06:43

## 2025-07-23 RX ADMIN — LOSARTAN POTASSIUM 25 MG: 25 TABLET, FILM COATED ORAL at 10:29

## 2025-07-23 RX ADMIN — HYDROMORPHONE HYDROCHLORIDE 1 MG: 1 INJECTION, SOLUTION INTRAMUSCULAR; INTRAVENOUS; SUBCUTANEOUS at 06:31

## 2025-07-23 RX ADMIN — HYDROMORPHONE HYDROCHLORIDE 1 MG: 1 INJECTION, SOLUTION INTRAMUSCULAR; INTRAVENOUS; SUBCUTANEOUS at 02:13

## 2025-07-23 RX ADMIN — OXYCODONE 10 MG: 5 TABLET ORAL at 14:34

## 2025-07-23 RX ADMIN — HEPARIN SODIUM 2500 UNITS: 5000 INJECTION, SOLUTION INTRAVENOUS; SUBCUTANEOUS at 09:48

## 2025-07-23 RX ADMIN — DULOXETINE 60 MG: 60 CAPSULE, DELAYED RELEASE ORAL at 10:29

## 2025-07-23 ASSESSMENT — PAIN SCALES - GENERAL
PAINLEVEL_OUTOF10: 7
PAINLEVEL_OUTOF10: 8
PAINLEVEL_OUTOF10: 6
PAINLEVEL_OUTOF10: 8
PAINLEVEL_OUTOF10: 6

## 2025-07-23 ASSESSMENT — PAIN SCALES - WONG BAKER: WONGBAKER_NUMERICALRESPONSE: NO HURT

## 2025-07-23 NOTE — PROGRESS NOTES
CLINICAL PHARMACY NOTE: MEDS TO BEDS    Total # of Prescriptions Filled: 6   The following medications were delivered to the patient:  Gas relief 80mg Chew   Ondansetron 4mg ODT   Oxycodone 5mg Tab   Acetaminophen 500mg Tab   Docusate 100mg Cap  Enoxaparin 40mg/0.4ml syrng    Additional Documentation:

## 2025-07-23 NOTE — DISCHARGE SUMMARY
Patient ID  Lakshmi Rebolledo   [id number]  1970     Admit date: 7/22/2025    Discharge date and time: No discharge date for patient encounter.     Admitting Physician: Mitesh Ahn MD     Discharge Physician: Jimy    Admission Diagnoses: Type 2 diabetes mellitus with microalbuminuria, without long-term current use of insulin (HCC) [E11.29, R80.9]  Obstructive sleep apnea on CPAP [G47.33]  GERD (gastroesophageal reflux disease) [K21.9]  Hypertension associated with diabetes (HCC) [E11.59, I15.2]  Tricompartment osteoarthritis of left knee [M17.12]  Morbid (severe) obesity due to excess calories (HCC) [E66.01]    Discharge Diagnoses: ssame    Admission Condition: fair    Discharged Condition: fair    Indication for Admission: morbid obesity and diabetes    Surgical Procedures: Robotic Joelle en Y gastric bypass    Hospital Course: Hospital Course: The patient was admitted to the surgical [facility] following the above operation, and recovered uneventfully on the surgical [facility] . At the time of discharge the patient was tolerating a Bariatric clear liquid diet, pain was controlled on PO medications, and was ambulating and voiding.     Consults: none    Discharge Exam:  Gen: alert, NAD  Lungs: Breathing comfortably on RA. No tachypnea, retractions, or increased WOB.  Abd: soft, ND, appropriately tender  Incision:  healing well    Disposition: Home.    Patient Instructions:   See D/C instructions    Activity: Do not lift anything heavier than 15 pounds for 2 weeks.    Diet: Bariatric full liquids  Wound Care: may shower.  Cover staples with band aids.    Mitesh Ahn MD   7/23/2025  10:17 AM      Mitesh Ahn MD, FASMBS, FACS, MBSAQIP Verified Surgeon  LCDR-USN-RET, Diplomate of The American Board of Surgery

## 2025-07-23 NOTE — PLAN OF CARE
Problem: Chronic Conditions and Co-morbidities  Goal: Patient's chronic conditions and co-morbidity symptoms are monitored and maintained or improved  7/23/2025 0046 by Sheyla Deleon RN  Outcome: Progressing  7/22/2025 1733 by Daphney Bledsoe RN  Outcome: Progressing     Problem: Discharge Planning  Goal: Discharge to home or other facility with appropriate resources  7/23/2025 0046 by Sheyla Deleon RN  Outcome: Progressing  7/22/2025 1733 by Daphney Bledsoe RN  Outcome: Progressing     Problem: Pain  Goal: Verbalizes/displays adequate comfort level or baseline comfort level  7/23/2025 0046 by Sheyla Deleon RN  Outcome: Progressing  7/22/2025 1733 by Daphney Bledsoe RN  Outcome: Progressing     Problem: Safety - Adult  Goal: Free from fall injury  7/23/2025 0046 by Sheyla Deleon RN  Outcome: Progressing  7/22/2025 1733 by Daphney Bledsoe RN  Outcome: Progressing     Problem: Skin/Tissue Integrity - Adult  Goal: Skin integrity remains intact  Outcome: Progressing  Goal: Incisions, wounds, or drain sites healing without S/S of infection  Outcome: Progressing  Goal: Oral mucous membranes remain intact  Outcome: Progressing     Problem: Gastrointestinal - Adult  Goal: Minimal or absence of nausea and vomiting  Outcome: Progressing  Goal: Maintains or returns to baseline bowel function  Outcome: Progressing  Goal: Maintains adequate nutritional intake  Outcome: Progressing     Problem: Metabolic/Fluid and Electrolytes - Adult  Goal: Electrolytes maintained within normal limits  Outcome: Progressing  Goal: Hemodynamic stability and optimal renal function maintained  Outcome: Progressing  Goal: Glucose maintained within prescribed range  Outcome: Progressing

## 2025-07-23 NOTE — PROGRESS NOTES
Spiritual Health History and Assessment/Progress Note  [facility]    Initial Encounter,  ,  ,      Name: Lakshmi Rebolledo MRN: [id number]    Age: 55 y.o.     Sex: female   Language: English   Amish: Buddhism   Obstructive sleep apnea on CPAP     Date: 7/23/2025            Total Time Calculated: 15 min              Spiritual Assessment began in [facility]            Encounter Overview/Reason: Initial Encounter  Service Provided For: Patient  Met with patient. Patient in bed, reclining. Patient states that she is doing ok. Patient expresses gratitude for being alive and attributes it to the \"the leila up there\". Family and pam help her cope with life's challenges.       Pam, Belief, Meaning:   Patient has beliefs or practices that help with coping during difficult times  Family/Friends No family/friends present      Importance and Influence:  Patient has no beliefs influential to healthcare decision-making identified during this visit  Family/Friends No family/friends present    Community:  Patient feels well-supported. Support system includes: Spouse/Partner, Children, and Extended family  Family/Friends No family/friends present    Assessment and Plan of Care:     Patient Interventions include: Facilitated expression of thoughts and feelings and Affirmed coping skills/support systems  Family/Friends Interventions include: No family/friends present    Patient Plan of Care: Spiritual Care available upon further referral  Family/Friends Plan of Care: No family/friends present    Electronically signed by Beba Sanz  Intern on 7/23/2025 at 10:20 AM

## 2025-07-23 NOTE — DISCHARGE INSTRUCTIONS
INSTRUCTIONS FOR YOUR CARE AFTER SURGERY  You may remove the bandages 48 hours after your surgery.  You may shower any time. Do NOT take baths, use swimming pools, or use hot tubs for 2 weeks.  Keep your head and chest elevated 30 degrees when sleeping to avoid regurgitation.    USE A SIP TIMER AND TAKE A SMALL SIP OF PROTEIN WATER EVERY 10 TO 15 MINUTES DURING THE DAY.    You may use ice packs on your incisions as needed for pain.    You may take Tylenol as needed for pain, in addition to the pain medication that has been prescribed for you. Follow the directions on the bottle. Do not consume more than 3,500 mg of acetaminophen (Tylenol) a day.    Check your blood pressure daily.  Do not take Losartan if your  top number is 130 or less, or if the bottom number is less than 80.    Check your blood sugars at least twice a day.  If your blood sugars are consistently 180 or higher or less than 70, call our office ([phone]).  If your blood sugars are in the low 60's, and you feel sweaty and shaky, drink 2 ounces of apple juice or Good Belly juice mixed with 2 ounces of water, and reassess your blood sugar in 30 minutes.  Remember that a blood sugar of 70 is actually normal.    Administer Lovenox 40 mg subcutaneously every 12 hours for 12 days.  If you have persistent incisional bleeing, bloody emesis, bloody stool, or black stool, stop Lovenox and call our office.  Walk a little bit every hour while awake.    Follow your Bariatric CLEAR  Liquid Diet for 2 weeks. Use protein water or protein shots to supplement protein.  Avoid protein shakes for the first 2 weeks.  Try to get at least 64 ouncs of liquid in each day.    Do NOT lift anything more than 15 pounds (about a bag of groceries) or play contact sports for 2 weeks.   Do NOT drive if you are taking narcotic pain medication.  You may shower the day after discharge.  Replace band aids after showering  Keep your head elevated 30 degrees with sleep to avoid

## 2025-07-23 NOTE — CARE COORDINATION
Case Management Assessment  Initial Evaluation    Date/Time of Evaluation: 7/23/2025 8:52 AM  Assessment Completed by: SENG Story    If patient is discharged prior to next notation, then this note serves as note for discharge by case management.    Patient Name: Lakshmi Rebolledo                   YOB: 1970  Diagnosis: Type 2 diabetes mellitus with microalbuminuria, without long-term current use of insulin (HCC) [E11.29, R80.9]  Obstructive sleep apnea on CPAP [G47.33]  GERD (gastroesophageal reflux disease) [K21.9]  Hypertension associated with diabetes (HCC) [E11.59, I15.2]  Tricompartment osteoarthritis of left knee [M17.12]  Morbid (severe) obesity due to excess calories (HCC) [E66.01]                   Date / Time: 7/22/2025  8:25 AM    Patient Admission Status: Inpatient   Readmission Risk (Low < 19, Mod (19-27), High > 27): Readmission Risk Score: 9    Current PCP: Rita Ross MD  PCP verified by CM? Yes    Chart Reviewed: Yes      History Provided by: Patient  Patient Orientation: Alert and Oriented, Person, Place, Situation, Self    Patient Cognition: Alert    Hospitalization in the last 30 days (Readmission):  No    If yes, Readmission Assessment in  Navigator will be completed.    Advance Directives:      Code Status: Full Code   Patient's Primary Decision Maker is: Legal Next of Kin      Discharge Planning:    Patient lives with: Family Members Type of Home: House  Primary Care Giver: Self  Patient Support Systems include: Spouse/Significant Other, Children, Family Members, Parent   Current Financial resources:    Current community resources:    Current services prior to admission: None            Current DME:              Type of Home Care services:  None    ADLS  Prior functional level: Independent in ADLs/IADLs  Current functional level: Independent in ADLs/IADLs    PT AM-PAC:   /24  OT AM-PAC:   /24    Family can provide assistance at DC: Yes  Would you like Case  Management to discuss the discharge plan with any other family members/significant others, and if so, who? No  Plans to Return to Present Housing: Yes  Other Identified Issues/Barriers to RETURNING to current housing: medical complications   Potential Assistance needed at discharge: N/A            Potential DME:    Patient expects to discharge to: House  Plan for transportation at discharge:      Financial    Payor: [facility] [employer] PLAN / Plan: [facility] [employer] PLAN / Product Type: *No Product type* /     Does insurance require precert for SNF: Yes    Potential assistance Purchasing Medications:    Meds-to-Beds request: Yes      [id number] - [place], [place] - [address] - P [phone] - F [phone]  [address]  [facility] [address]  Phone: [phone] Fax: [phone]    [facility] [place], [place] - [address]  - P [phone] - F [phone]  [address] [facility] [address]  Phone: [phone] Fax: [phone]    [facility] - [place], [place] - [address] [facility] [phone] - F [phone]  [address] [facility] [address]  Phone: [phone] Fax: [phone]      Notes:    Factors facilitating achievement of predicted outcomes: Family support    Barriers to discharge: Medical complications    Additional Case Management Notes:   Patient is alert, oriented.  Patient lives with her supportive  and several family members.  Patient does not use oxygen.  Patient does not receive dialysis.  Patient does not receive any home care services.  Patient does drive and has several family members that are able to assist with transportation needs.  Patient is not a .  Patient plans to return home upon discharge and is denying all home going needs at this time.     The Plan for Transition of Care is related to the following treatment goals of Type 2 diabetes mellitus with microalbuminuria, without long-term current use of insulin (HCC)

## 2025-07-25 ENCOUNTER — TELEPHONE (OUTPATIENT)
Age: 55
End: 2025-07-25

## 2025-07-25 NOTE — TELEPHONE ENCOUNTER
Bariatric Post-Op Discharge Follow-Up Call    Type of Surgery: Robotic Laparoscopic Gastric Bypass Joelle-en-y Lysis Of Adhesions Modifier 22  Date of Surgery: 7/22/2025  Hospital Discharge Date: 7/23/2025    Post-op outreach call made to pt on 7/25/2025.    Nausea present Yes with zofran  Vomiting present No  Nausea and/or vomiting managed by medication Yes  Fever >101/chills No  Chest pain/dyspnea/tachycardia No  Dysphagia to liquids: states difficult to swallow protein olmstead, states \"feels like I'm swallowing broken glass.\"  Tolerating other clear liquids.  Pain Yes. Patient rates pain at a 2      Pt reports frequent ambulation around home.     Intake is described as Fair  Fluid intake: Patient tolerating clear liquid diet. , about 30 oz as of 1:30 PM today  Protein intake: < 20 grams/day, adding unflavored protein powder to SF jello and sipping bone broth  Pt reports urine output of at least 4 times in a 24 hour period.     Passing flatus.  Agrees to monitor for BM. BM Yes.      Patient reports laparoscopic incision sites  no drainage and staples intact.   Drain removed prior to hospital discharge. Drainage at site: None    Lovenox Yes Taking as directed. Reviewed S/S of bleeding.  Checking BP  Yes.   Checking Blood Sugar Yes.          Have you scheduled post-op appointment with primary care provider?  No     Future Appointments   Date Time Provider Department Center   7/31/2025 11:00 AM Candice Rodriguez RD [facility]   7/31/2025 11:30 AM Mitesh Ahn MD [facility]   8/22/2025 11:30 AM Mitesh Ahn MD [facility]   8/29/2025  2:30 PM Candice Rodriguez RD [facility]   8/29/2025  3:00 PM Michel Stuart PSYD [facility]   11/10/2025 11:00 AM Gabi Pack MD [facility]   .        Allowed pt the opportunity to ask questions.  Reminded patient to reference the patient education materials as

## 2025-07-31 ENCOUNTER — OFFICE VISIT (OUTPATIENT)
Age: 55
End: 2025-07-31
Payer: COMMERCIAL

## 2025-07-31 ENCOUNTER — OFFICE VISIT (OUTPATIENT)
Age: 55
End: 2025-07-31

## 2025-07-31 VITALS
BODY MASS INDEX: 49.59 KG/M2 | WEIGHT: 252.6 LBS | DIASTOLIC BLOOD PRESSURE: 60 MMHG | HEART RATE: 84 BPM | SYSTOLIC BLOOD PRESSURE: 100 MMHG | HEIGHT: 60 IN | OXYGEN SATURATION: 98 %

## 2025-07-31 VITALS — BODY MASS INDEX: 49.59 KG/M2 | HEIGHT: 60 IN | WEIGHT: 252.6 LBS

## 2025-07-31 DIAGNOSIS — E66.01 MORBID OBESITY (HCC): Primary | ICD-10-CM

## 2025-07-31 DIAGNOSIS — E66.9 TYPE 2 DIABETES MELLITUS WITH OBESITY (HCC): Primary | ICD-10-CM

## 2025-07-31 DIAGNOSIS — Z98.84 S/P GASTRIC BYPASS: ICD-10-CM

## 2025-07-31 DIAGNOSIS — Z71.3 DIETARY COUNSELING AND SURVEILLANCE: ICD-10-CM

## 2025-07-31 DIAGNOSIS — E66.01 MORBID OBESITY (HCC): ICD-10-CM

## 2025-07-31 DIAGNOSIS — E11.69 TYPE 2 DIABETES MELLITUS WITH OBESITY (HCC): Primary | ICD-10-CM

## 2025-07-31 PROCEDURE — PBSHW PBB SHADOW CHARGE: Performed by: DIETITIAN, REGISTERED

## 2025-07-31 PROCEDURE — 99024 POSTOP FOLLOW-UP VISIT: CPT | Performed by: SURGERY

## 2025-07-31 PROCEDURE — G8428 CUR MEDS NOT DOCUMENT: HCPCS | Performed by: DIETITIAN, REGISTERED

## 2025-07-31 PROCEDURE — 97803 MED NUTRITION INDIV SUBSEQ: CPT | Performed by: DIETITIAN, REGISTERED

## 2025-07-31 PROCEDURE — 3044F HG A1C LEVEL LT 7.0%: CPT | Performed by: DIETITIAN, REGISTERED

## 2025-07-31 PROCEDURE — G8417 CALC BMI ABV UP PARAM F/U: HCPCS | Performed by: DIETITIAN, REGISTERED

## 2025-07-31 RX ORDER — ANASTROZOLE 1 MG/1
1 TABLET ORAL DAILY
COMMUNITY
Start: 2025-07-28

## 2025-07-31 NOTE — PROGRESS NOTES
Post Op Clinic Visit      Lakshmi Rebolledo  is 1 week  post-op from Robotic Joelle en Y gastric bypass.    There have not been any fevers or chills.  Abdominal pain has been minimal  There has been no incisional redness or discharge.    Weight:       Wt Readings from Last 1 Encounters:   07/22/25 117.5 kg (259 lb)      BMI:       BMI Readings from Last 1 Encounters:   07/22/25 54.13 kg/m²         Pre-Surgery Weight: 260 lbs  TBW lost: 7 lbs  % TBW lost 3 %  % EBW lost: 5 %     Patient has lost a total of 32 lbs from start of program.     Labs reviewed: N/A, BG  mg/dl at home on metformin     Intolerances/Difficulties: none     Vitamins: to start today     Protein goal: not met, 40 grams/day     Fluid goal: met, 56 oz/day     Exercise:  walking some every hour     Nutrition Diagnosis:  PES: Inadequate protein intake related to volume restriction s/p weight loss surgery and clear liquid diet restriction as evidenced by reported intake of 40 grams protein/day.     Nutrition Intervention:  Education provided to include review of post surgical diet progression, meal timing, protein and fluid intake, vitamins, and exercise.      Nutrition Monitoring and evaluation:  Protein intake: 69-87 grams/day (1.2-1.5gr/kg ABW)  Fluid intake: 64-90oz sugar free, calorie free, caffeine free, non carbonated beverages  Vitamins: Bariatric MVI with iron,  Calcium citrate 1000-1500mg/day in divided doses  Exercise: 45-60 minutes of exercise 5 days/week, including 3 days of strength training     Dietitian Plan/Recommendation:   Continue CL diet, advance to [place] diet post op week 3  Increase protein to 60-70 grams/day as advance diet  Begin taking Bariatric MVI with iron and Calcium citrate 500-600mg twice daily  Track daily intake       Physical Exam:    /60   Pulse 84   Ht 1.524 m (5')   Wt 114.6 kg (252 lb 9.6 oz)   LMP 01/27/2015   SpO2 98%   BMI 49.33 kg/m²     General: No acute distress  HEENT: P PERALIDA, no scleral

## 2025-07-31 NOTE — PROGRESS NOTES
Medical Nutrition Therapy  [facility]    Patient here for her 1 week post op Joelle en Y gastric bypass.         7/31/2025  Height:   Ht Readings from Last 1 Encounters:   07/22/25 1.473 m (4' 10\")     Weight:   Wt Readings from Last 1 Encounters:   07/22/25 117.5 kg (259 lb)     BMI:   BMI Readings from Last 1 Encounters:   07/22/25 54.13 kg/m²       Pre-Surgery Weight: 260 lbs  TBW lost: 7 lbs  % TBW lost 3 %  % EBW lost: 5 %    Patient has lost a total of 32 lbs from start of program.    Labs reviewed: N/A, BG  mg/dl at home on metformin    Intolerances/Difficulties: none    Vitamins: to start today    Protein goal: not met, 40 grams/day    Fluid goal: met, 56 oz/day     Exercise:  walking some every hour    Nutrition Diagnosis:  PES: Inadequate protein intake related to volume restriction s/p weight loss surgery and clear liquid diet restriction as evidenced by reported intake of 40 grams protein/day.    Nutrition Intervention:  Education provided to include review of post surgical diet progression, meal timing, protein and fluid intake, vitamins, and exercise.     Nutrition Monitoring and evaluation:  Protein intake: 69-87 grams/day (1.2-1.5gr/kg ABW)  Fluid intake: 64-90oz sugar free, calorie free, caffeine free, non carbonated beverages  Vitamins: Bariatric MVI with iron,  Calcium citrate 1000-1500mg/day in divided doses  Exercise: 45-60 minutes of exercise 5 days/week, including 3 days of strength training    Plan/Recommendation:   Continue CL diet, advance to FL diet post op week 3  Increase protein to 60-70 grams/day as advance diet  Begin taking Bariatric MVI with iron and Calcium citrate 500-600mg twice daily  Track daily intake    Time spent with patient 30 minutes    Follow up per program protocol    Candice Rodriguez RD

## 2025-07-31 NOTE — PATIENT INSTRUCTIONS
Keep up the hard work!    I recommend stopping metformin.    Check your blood pressure daily.  Do not take losartan if your top number is 130 or less, or if the bottom number is less than 80.        Start multi-vitamin and calcium supplementation.    THE BIG FOUR GUIDELINES:  1.  Count calories!  People who count calories eat less.  Use the daily plate or other apps for your smart phone or computer.  The more you count the more of an expert you will become and will get easier and easier.  If you are trying to lose weight and exercising a lot, keep calories to around the thousand to 1200 a day.  If you are not exercising consistently try to limit them to around 800 a day.    2.  Separate liquids and solids.  Wait 30 minutes to an hour after you eat before drinking liquids.  This will reduce the total amount of food you eat in the meal.    3.  Exercise!  You need up to 5 hours a week with a combination of cardio and resistance or weight training in order to keep excess body fat off.    4.  Sleep!   Try to get 7 or more hours of sleep a night.  If you have obstructive sleep apnea definitely use your CPAP machine.    THE RULE OF 20'S:  For every meal, chew each bite 20 seconds.  Then put the fork down and wait 20 seconds after you swallow before picking up the fork again.  Each meal should take at least 20 minutes so your brain can register that you are full.    If you are happy with your care, please go  to [email or web address] and/or [employer] and leave a review for Mitesh Ahn MD.  The charitable nature of our practice makes a marketing budget a challenge and your review could help us help more people.

## 2025-08-03 ENCOUNTER — HOSPITAL ENCOUNTER (EMERGENCY)
Age: 55
Discharge: HOME | End: 2025-08-03
Attending: EMERGENCY MEDICINE
Payer: COMMERCIAL

## 2025-08-03 VITALS
DIASTOLIC BLOOD PRESSURE: 101 MMHG | SYSTOLIC BLOOD PRESSURE: 136 MMHG | OXYGEN SATURATION: 96 % | TEMPERATURE: 98 F | HEART RATE: 87 BPM | RESPIRATION RATE: 20 BRPM

## 2025-08-03 DIAGNOSIS — T81.31XA DEHISCENCE OF OPERATIVE WOUND, INITIAL ENCOUNTER: Primary | ICD-10-CM

## 2025-08-03 PROCEDURE — 99282 EMERGENCY DEPT VISIT SF MDM: CPT

## 2025-08-03 ASSESSMENT — PAIN - FUNCTIONAL ASSESSMENT: PAIN_FUNCTIONAL_ASSESSMENT: NONE - DENIES PAIN

## 2025-08-03 ASSESSMENT — LIFESTYLE VARIABLES
HOW OFTEN DO YOU HAVE A DRINK CONTAINING ALCOHOL: NEVER
HOW MANY STANDARD DRINKS CONTAINING ALCOHOL DO YOU HAVE ON A TYPICAL DAY: PATIENT DOES NOT DRINK

## 2025-08-04 ENCOUNTER — TELEPHONE (OUTPATIENT)
Age: 55
End: 2025-08-04

## 2025-08-04 ENCOUNTER — TRANSCRIBE ORDERS (OUTPATIENT)
Dept: ADMINISTRATIVE | Age: 55
End: 2025-08-04

## 2025-08-04 DIAGNOSIS — R92.8 OTHER ABNORMAL AND INCONCLUSIVE FINDINGS ON DIAGNOSTIC IMAGING OF BREAST: Primary | ICD-10-CM

## 2025-08-06 ENCOUNTER — PATIENT MESSAGE (OUTPATIENT)
Age: 55
End: 2025-08-06

## 2025-08-20 DIAGNOSIS — M48.062 SPINAL STENOSIS OF LUMBAR REGION WITH NEUROGENIC CLAUDICATION: ICD-10-CM

## 2025-08-20 DIAGNOSIS — G89.29 CHRONIC PAIN OF BOTH KNEES: ICD-10-CM

## 2025-08-20 DIAGNOSIS — M25.561 CHRONIC PAIN OF BOTH KNEES: ICD-10-CM

## 2025-08-20 DIAGNOSIS — M47.816 LUMBAR SPONDYLOSIS: ICD-10-CM

## 2025-08-20 DIAGNOSIS — M25.562 CHRONIC PAIN OF BOTH KNEES: ICD-10-CM

## 2025-08-20 RX ORDER — GABAPENTIN 600 MG/1
600 TABLET ORAL 3 TIMES DAILY
Qty: 90 TABLET | Refills: 3 | Status: SHIPPED | OUTPATIENT
Start: 2025-08-20 | End: 2025-12-18

## 2025-08-22 ENCOUNTER — OFFICE VISIT (OUTPATIENT)
Age: 55
End: 2025-08-22
Payer: COMMERCIAL

## 2025-08-22 VITALS
WEIGHT: 245.2 LBS | OXYGEN SATURATION: 97 % | BODY MASS INDEX: 48.14 KG/M2 | SYSTOLIC BLOOD PRESSURE: 104 MMHG | DIASTOLIC BLOOD PRESSURE: 68 MMHG | HEART RATE: 76 BPM | HEIGHT: 60 IN

## 2025-08-22 DIAGNOSIS — E66.01 MORBID OBESITY (HCC): ICD-10-CM

## 2025-08-22 DIAGNOSIS — Z98.84 BARIATRIC SURGERY STATUS: Primary | ICD-10-CM

## 2025-08-22 PROCEDURE — 99214 OFFICE O/P EST MOD 30 MIN: CPT | Performed by: SURGERY

## 2025-08-22 RX ORDER — BUPROPION HYDROCHLORIDE 150 MG/1
TABLET, EXTENDED RELEASE ORAL
COMMUNITY
Start: 2025-08-19

## 2025-08-22 RX ORDER — TRAZODONE HYDROCHLORIDE 50 MG/1
50 TABLET ORAL NIGHTLY PRN
COMMUNITY
Start: 2025-08-19

## 2025-08-22 RX ORDER — PRAMIPEXOLE DIHYDROCHLORIDE 1 MG/1
1 TABLET ORAL NIGHTLY
COMMUNITY
Start: 2025-08-05

## 2025-08-22 RX ORDER — TOPIRAMATE 50 MG/1
50 TABLET, FILM COATED ORAL 2 TIMES DAILY
Qty: 180 TABLET | Refills: 1 | Status: SHIPPED | OUTPATIENT
Start: 2025-08-22

## 2025-08-26 ENCOUNTER — OFFICE VISIT (OUTPATIENT)
Age: 55
End: 2025-08-26
Payer: COMMERCIAL

## 2025-08-26 VITALS
TEMPERATURE: 97.1 F | OXYGEN SATURATION: 98 % | HEIGHT: 60 IN | HEART RATE: 73 BPM | WEIGHT: 245 LBS | BODY MASS INDEX: 48.1 KG/M2

## 2025-08-26 DIAGNOSIS — M25.562 CHRONIC PAIN OF BOTH KNEES: ICD-10-CM

## 2025-08-26 DIAGNOSIS — M48.062 SPINAL STENOSIS OF LUMBAR REGION WITH NEUROGENIC CLAUDICATION: Primary | ICD-10-CM

## 2025-08-26 DIAGNOSIS — M47.816 LUMBAR SPONDYLOSIS: ICD-10-CM

## 2025-08-26 DIAGNOSIS — M25.561 CHRONIC PAIN OF BOTH KNEES: ICD-10-CM

## 2025-08-26 DIAGNOSIS — G89.29 CHRONIC PAIN OF BOTH KNEES: ICD-10-CM

## 2025-08-26 PROCEDURE — G8417 CALC BMI ABV UP PARAM F/U: HCPCS | Performed by: STUDENT IN AN ORGANIZED HEALTH CARE EDUCATION/TRAINING PROGRAM

## 2025-08-26 PROCEDURE — G2211 COMPLEX E/M VISIT ADD ON: HCPCS | Performed by: STUDENT IN AN ORGANIZED HEALTH CARE EDUCATION/TRAINING PROGRAM

## 2025-08-26 PROCEDURE — 99214 OFFICE O/P EST MOD 30 MIN: CPT | Performed by: STUDENT IN AN ORGANIZED HEALTH CARE EDUCATION/TRAINING PROGRAM

## 2025-08-26 PROCEDURE — 3017F COLORECTAL CA SCREEN DOC REV: CPT | Performed by: STUDENT IN AN ORGANIZED HEALTH CARE EDUCATION/TRAINING PROGRAM

## 2025-08-26 PROCEDURE — G8427 DOCREV CUR MEDS BY ELIG CLIN: HCPCS | Performed by: STUDENT IN AN ORGANIZED HEALTH CARE EDUCATION/TRAINING PROGRAM

## 2025-08-26 PROCEDURE — 1036F TOBACCO NON-USER: CPT | Performed by: STUDENT IN AN ORGANIZED HEALTH CARE EDUCATION/TRAINING PROGRAM

## 2025-08-26 ASSESSMENT — ENCOUNTER SYMPTOMS: BACK PAIN: 1

## 2025-08-28 ENCOUNTER — HOSPITAL ENCOUNTER (OUTPATIENT)
Dept: WOMENS IMAGING | Age: 55
Discharge: HOME | End: 2025-08-30
Payer: COMMERCIAL

## 2025-08-28 ENCOUNTER — HOSPITAL ENCOUNTER (OUTPATIENT)
Dept: ULTRASOUND IMAGING | Age: 55
Discharge: HOME | End: 2025-08-30
Payer: COMMERCIAL

## 2025-08-28 DIAGNOSIS — R92.8 OTHER ABNORMAL AND INCONCLUSIVE FINDINGS ON DIAGNOSTIC IMAGING OF BREAST: ICD-10-CM

## 2025-08-28 PROCEDURE — 76642 ULTRASOUND BREAST LIMITED: CPT

## 2025-08-28 PROCEDURE — G0279 TOMOSYNTHESIS, MAMMO: HCPCS

## 2025-08-29 ENCOUNTER — OFFICE VISIT (OUTPATIENT)
Age: 55
End: 2025-08-29
Payer: COMMERCIAL

## 2025-08-29 VITALS — BODY MASS INDEX: 48.69 KG/M2 | WEIGHT: 248 LBS | HEIGHT: 60 IN

## 2025-08-29 DIAGNOSIS — F54 PSYCHOLOGICAL FACTORS AFFECTING MORBID OBESITY (HCC): Primary | ICD-10-CM

## 2025-08-29 DIAGNOSIS — Z98.84 S/P GASTRIC BYPASS: ICD-10-CM

## 2025-08-29 DIAGNOSIS — M47.816 LUMBAR SPONDYLOSIS: ICD-10-CM

## 2025-08-29 DIAGNOSIS — G89.29 CHRONIC PAIN OF BOTH KNEES: ICD-10-CM

## 2025-08-29 DIAGNOSIS — E11.69 TYPE 2 DIABETES MELLITUS WITH OBESITY (HCC): Primary | ICD-10-CM

## 2025-08-29 DIAGNOSIS — E66.813 CLASS 3 SEVERE OBESITY DUE TO EXCESS CALORIES WITH SERIOUS COMORBIDITY AND BODY MASS INDEX (BMI) OF 45.0 TO 49.9 IN ADULT (HCC): ICD-10-CM

## 2025-08-29 DIAGNOSIS — F39 UNSPECIFIED MOOD (AFFECTIVE) DISORDER: ICD-10-CM

## 2025-08-29 DIAGNOSIS — E66.01 MORBID OBESITY (HCC): ICD-10-CM

## 2025-08-29 DIAGNOSIS — M25.562 CHRONIC PAIN OF BOTH KNEES: ICD-10-CM

## 2025-08-29 DIAGNOSIS — E11.65 TYPE 2 DIABETES MELLITUS WITH HYPERGLYCEMIA, WITHOUT LONG-TERM CURRENT USE OF INSULIN (HCC): ICD-10-CM

## 2025-08-29 DIAGNOSIS — M25.561 CHRONIC PAIN OF BOTH KNEES: ICD-10-CM

## 2025-08-29 DIAGNOSIS — Z71.3 DIETARY COUNSELING AND SURVEILLANCE: ICD-10-CM

## 2025-08-29 DIAGNOSIS — E66.9 TYPE 2 DIABETES MELLITUS WITH OBESITY (HCC): Primary | ICD-10-CM

## 2025-08-29 DIAGNOSIS — G47.33 OBSTRUCTIVE SLEEP APNEA ON CPAP: ICD-10-CM

## 2025-08-29 DIAGNOSIS — F43.10 PTSD (POST-TRAUMATIC STRESS DISORDER): ICD-10-CM

## 2025-08-29 DIAGNOSIS — E66.01 PSYCHOLOGICAL FACTORS AFFECTING MORBID OBESITY (HCC): Primary | ICD-10-CM

## 2025-08-29 PROCEDURE — 3044F HG A1C LEVEL LT 7.0%: CPT | Performed by: DIETITIAN, REGISTERED

## 2025-08-29 PROCEDURE — G8417 CALC BMI ABV UP PARAM F/U: HCPCS | Performed by: DIETITIAN, REGISTERED

## 2025-08-29 PROCEDURE — PBSHW PBB SHADOW CHARGE: Performed by: DIETITIAN, REGISTERED

## 2025-08-29 PROCEDURE — 90832 PSYTX W PT 30 MINUTES: CPT | Performed by: PSYCHOLOGIST

## 2025-08-29 PROCEDURE — G8428 CUR MEDS NOT DOCUMENT: HCPCS | Performed by: DIETITIAN, REGISTERED

## 2025-08-29 PROCEDURE — 97803 MED NUTRITION INDIV SUBSEQ: CPT | Performed by: DIETITIAN, REGISTERED

## 2025-08-29 ASSESSMENT — COLUMBIA-SUICIDE SEVERITY RATING SCALE - C-SSRS
1. WITHIN THE PAST MONTH, HAVE YOU WISHED YOU WERE DEAD OR WISHED YOU COULD GO TO SLEEP AND NOT WAKE UP?: YES
2. HAVE YOU ACTUALLY HAD ANY THOUGHTS OF KILLING YOURSELF?: NO

## 2025-08-29 ASSESSMENT — PATIENT HEALTH QUESTIONNAIRE - PHQ9
7. TROUBLE CONCENTRATING ON THINGS, SUCH AS READING THE NEWSPAPER OR WATCHING TELEVISION: NOT AT ALL
2. FEELING DOWN, DEPRESSED OR HOPELESS: MORE THAN HALF THE DAYS
8. MOVING OR SPEAKING SO SLOWLY THAT OTHER PEOPLE COULD HAVE NOTICED. OR THE OPPOSITE, BEING SO FIGETY OR RESTLESS THAT YOU HAVE BEEN MOVING AROUND A LOT MORE THAN USUAL: NOT AT ALL
SUM OF ALL RESPONSES TO PHQ QUESTIONS 1-9: 11
1. LITTLE INTEREST OR PLEASURE IN DOING THINGS: MORE THAN HALF THE DAYS
SUM OF ALL RESPONSES TO PHQ QUESTIONS 1-9: 11
SUM OF ALL RESPONSES TO PHQ QUESTIONS 1-9: 11
SUM OF ALL RESPONSES TO PHQ QUESTIONS 1-9: 10
4. FEELING TIRED OR HAVING LITTLE ENERGY: MORE THAN HALF THE DAYS
5. POOR APPETITE OR OVEREATING: NOT AT ALL
6. FEELING BAD ABOUT YOURSELF - OR THAT YOU ARE A FAILURE OR HAVE LET YOURSELF OR YOUR FAMILY DOWN: SEVERAL DAYS
3. TROUBLE FALLING OR STAYING ASLEEP: NEARLY EVERY DAY
9. THOUGHTS THAT YOU WOULD BE BETTER OFF DEAD, OR OF HURTING YOURSELF: SEVERAL DAYS
10. IF YOU CHECKED OFF ANY PROBLEMS, HOW DIFFICULT HAVE THESE PROBLEMS MADE IT FOR YOU TO DO YOUR WORK, TAKE CARE OF THINGS AT HOME, OR GET ALONG WITH OTHER PEOPLE: SOMEWHAT DIFFICULT

## 2025-09-02 ENCOUNTER — TELEPHONE (OUTPATIENT)
Age: 55
End: 2025-09-02

## (undated) DEVICE — STAPLER 60: Brand: SUREFORM

## (undated) DEVICE — SYRINGE MED 5ML STD CLR PLAS LUERLOCK TIP N CTRL DISP

## (undated) DEVICE — GLOVE ORANGE PI 7   MSG9070

## (undated) DEVICE — SHEET,DRAPE,53X77,STERILE: Brand: MEDLINE

## (undated) DEVICE — NEEDLE SPNL 20GA L3 1 2IN YEL S STL POLYCARB HUB MTL STYL

## (undated) DEVICE — CONTAINER,SPECIMEN,OR STERILE,4OZ: Brand: MEDLINE

## (undated) DEVICE — GLOVE ORANGE PI 8   MSG9080

## (undated) DEVICE — SYRINGE MED 10ML LUERLOCK TIP W/O SFTY DISP

## (undated) DEVICE — SPONGE GZ W4XL4IN COT 12 PLY TYP VII WVN C FLD DSGN STERILE

## (undated) DEVICE — SYRINGE MEDICAL 3ML CLEAR PLASTIC STANDARD NON CONTROL LUERLOCK TIP DISPOSABLE

## (undated) DEVICE — STAPLER 60 RELOAD BLUE: Brand: SUREFORM

## (undated) DEVICE — TUBE SET 96 MM 64 MM H2O PERISTALTIC STD AUX CHANNEL

## (undated) DEVICE — HYPODERMIC SAFETY NEEDLE: Brand: MAGELLAN

## (undated) DEVICE — NEEDLE HYPO 25GA L1.5IN BLU POLYPR HUB S STL REG BVL STR

## (undated) DEVICE — FORCEPS BX L240CM JAW DIA2.8MM L CAP W/ NDL MIC MESH TOOTH

## (undated) DEVICE — ADAPTER FLSH PMP FLD MGMT GI IRRIG OFP 2 DISPOSABLE

## (undated) DEVICE — TTL1LYR 16FR10ML 100%SIL TMPST TR: Brand: MEDLINE

## (undated) DEVICE — PLATE ES AD W 9FT CRD 2

## (undated) DEVICE — DRAPE,UTILITY,TAPE,15X26,STERILE: Brand: MEDLINE

## (undated) DEVICE — COUNTER NDL 40 COUNT HLD 70 FOAM BLK ADH W/ MAG

## (undated) DEVICE — LABEL MED MINI W/ MARKER

## (undated) DEVICE — GOWN,AURORA,NONREINFORCED,LARGE: Brand: MEDLINE

## (undated) DEVICE — NEEDLE SPNL 25GA L3.5IN BLU HUB S STL REG WALL FIT STYL W/

## (undated) DEVICE — GENERAL LAPAROSCOPY: Brand: MEDLINE INDUSTRIES, INC.

## (undated) DEVICE — TOWEL,OR,DSP,ST,BLUE,STD,4/PK,20PK/CS: Brand: MEDLINE

## (undated) DEVICE — SPONGE GZ W4XL4IN RAYON POLY CVR W/NONWOVEN FAB STRL 2/PK

## (undated) DEVICE — VISIGI 3D®  CALIBRATION SYSTEM  SIZE 40FR STD W/ BULB: Brand: BOEHRINGER® VISIGI 3D™ SLEEVE GASTRECTOMY CALIBRATION SYSTEM, SIZE 40FR W/BULB

## (undated) DEVICE — STAPLER 60 RELOAD WHITE: Brand: SUREFORM

## (undated) DEVICE — ARM DRAPE

## (undated) DEVICE — ENDOSCOPIC TRAY TRNSPRT 20.5X16.5X4.1 IN RECYCL SUGAR PULP

## (undated) DEVICE — KIT CANSTR VAC TANTEM TB FOR AQUILEX FLD CTRL SYS

## (undated) DEVICE — SET FLD CTRL SYS INFLO AND OUTFLO TB AQUILEX

## (undated) DEVICE — SUTURE ABSORBABLE MONOFILAMENT 2-0 SH 6 IN STRATAFIX SPRL SXPP1B415

## (undated) DEVICE — APPLICATOR MEDICATED 26 CC SOLUTION CLR STRL CHLORAPREP

## (undated) DEVICE — SEAL

## (undated) DEVICE — NEEDLE SPINAL 22GA L3.5IN SPINOCAN

## (undated) DEVICE — TIP COVER ACCESSORY

## (undated) DEVICE — MARKER SURG SKIN GENTIAN VLT REG TIP W/ 6IN RUL DYNJSM01

## (undated) DEVICE — DRAIN SURG 19FR 0.25IN SIL RND W/ TRCR INDIC DOT RADPQ FULL

## (undated) DEVICE — COLUMN DRAPE

## (undated) DEVICE — BRUSH ENDO CLN L90.5IN SHTH DIA1.7MM BRIST DIA5-7MM 2-6MM

## (undated) DEVICE — SET ENDOSCP SEAL HYSTEROSCOPE RIG OUTFLO CHN DISP MYOSURE

## (undated) DEVICE — WARMER SCP 2 ANTIFOG LAP DISP

## (undated) DEVICE — RESERVOIR,SUCTION,100CC,SILICONE: Brand: MEDLINE

## (undated) DEVICE — LITHOTOMY: Brand: MEDLINE INDUSTRIES, INC.

## (undated) DEVICE — SUTURE VICRYL + SZ 0 L27IN ABSRB VLT L26MM UR-6 5/8 CIR VCP603H

## (undated) DEVICE — SYRINGE MED 30ML STD CLR PLAS LUERLOCK TIP N CTRL DISP

## (undated) DEVICE — SINGLE PORT MANIFOLD: Brand: NEPTUNE 2

## (undated) DEVICE — ELECTRODE SURG MPLR NEUT SELF ADH PT PLT MULTIGEN

## (undated) DEVICE — APPLICATOR MEDICATED 10.5 CC SOLUTION HI LT ORNG CHLORAPREP

## (undated) DEVICE — CATHETER,URETHRAL,VINYL,FEMALE,6",14FR: Brand: MEDLINE

## (undated) DEVICE — TRAP POLYP BALEEN

## (undated) DEVICE — SUCTION IRRIGATOR: Brand: ENDOWRIST

## (undated) DEVICE — GLOVE ORANGE PI 8 1/2   MSG9085

## (undated) DEVICE — BANDAGE,ADHESVE,FABRIC,2"X4",ST,LF,50/BX: Brand: CURAD

## (undated) DEVICE — STAPLER 60 RELOAD GREEN: Brand: SUREFORM

## (undated) DEVICE — BLADELESS OBTURATOR: Brand: WECK VISTA

## (undated) DEVICE — VESSEL SEALER EXTEND: Brand: ENDOWRIST

## (undated) DEVICE — SET EXTN L85IN TBNG STD BOR PRSS RATE PUR STRP MAXPLUS CLR

## (undated) DEVICE — SUTURE NONABSORBABLE MONOFILAMENT 3-0 PS-1 18 IN BLK ETHILON 1663H

## (undated) DEVICE — GLOVE SURG SZ 65 THK91MIL LTX FREE SYN POLYISOPRENE

## (undated) DEVICE — ELECTRO LUBE IS A SINGLE PATIENT USE DEVICE THAT IS INTENDED TO BE USED ON ELECTROSURGICAL ELECTRODES TO REDUCE STICKING.: Brand: KEY SURGICAL ELECTRO LUBE

## (undated) DEVICE — ENDO CARRY-ON PROCEDURE KIT INCLUDES LUBRICANT, DEFENDO OLYMPUS AIR, WATER, SUCTION, BIOPSY VALVE KIT, ENZYMATIC SPONGE, AND BASIN.: Brand: ENDO CARRY-ON PROCEDURE KIT

## (undated) DEVICE — APPLICATOR MEDICATED 26 CC SOLUTION HI LT ORNG CHLORAPREP

## (undated) DEVICE — CONMED SCOPE SAVER BITE BLOCK, 20X27 MM: Brand: SCOPE SAVER

## (undated) DEVICE — LITHOTOMY DRAPE WITH FLUID CONTROL POUCH: Brand: CONVERTORS

## (undated) DEVICE — RF CANNULA, CVD: Brand: VENOM

## (undated) DEVICE — TUBING IRRIGATION 140/160/180/190 SER GI ENDOSCP SMARTCAP

## (undated) DEVICE — Device

## (undated) DEVICE — SNARE ENDOSCP AD L240CM LOOP W10MM SHTH DIA2.4MM RND INSUL